# Patient Record
Sex: FEMALE | Race: WHITE | NOT HISPANIC OR LATINO | Employment: FULL TIME | URBAN - METROPOLITAN AREA
[De-identification: names, ages, dates, MRNs, and addresses within clinical notes are randomized per-mention and may not be internally consistent; named-entity substitution may affect disease eponyms.]

---

## 2017-06-09 ENCOUNTER — ALLSCRIPTS OFFICE VISIT (OUTPATIENT)
Dept: OTHER | Facility: OTHER | Age: 47
End: 2017-06-09

## 2017-06-09 DIAGNOSIS — K92.1 MELENA: ICD-10-CM

## 2017-06-09 DIAGNOSIS — Z12.31 ENCOUNTER FOR SCREENING MAMMOGRAM FOR MALIGNANT NEOPLASM OF BREAST: ICD-10-CM

## 2017-06-09 DIAGNOSIS — Z13.6 ENCOUNTER FOR SCREENING FOR CARDIOVASCULAR DISORDERS: ICD-10-CM

## 2017-08-10 ENCOUNTER — TRANSCRIBE ORDERS (OUTPATIENT)
Dept: ADMINISTRATIVE | Facility: HOSPITAL | Age: 47
End: 2017-08-10

## 2017-08-10 ENCOUNTER — APPOINTMENT (OUTPATIENT)
Dept: LAB | Facility: HOSPITAL | Age: 47
End: 2017-08-10

## 2017-08-10 DIAGNOSIS — Z00.8 HEALTH EXAMINATION IN POPULATION SURVEY: Primary | ICD-10-CM

## 2017-08-10 DIAGNOSIS — Z00.8 HEALTH EXAMINATION IN POPULATION SURVEY: ICD-10-CM

## 2017-08-10 LAB
CHOLEST SERPL-MCNC: 196 MG/DL (ref 50–200)
EST. AVERAGE GLUCOSE BLD GHB EST-MCNC: 114 MG/DL
HBA1C MFR BLD: 5.6 % (ref 4.2–6.3)
HDLC SERPL-MCNC: 39 MG/DL (ref 40–60)
LDLC SERPL CALC-MCNC: 133 MG/DL (ref 0–100)
TRIGL SERPL-MCNC: 119 MG/DL

## 2017-08-10 PROCEDURE — 80061 LIPID PANEL: CPT

## 2017-08-10 PROCEDURE — 36415 COLL VENOUS BLD VENIPUNCTURE: CPT

## 2017-08-10 PROCEDURE — 83036 HEMOGLOBIN GLYCOSYLATED A1C: CPT | Performed by: PREVENTIVE MEDICINE

## 2017-12-18 ENCOUNTER — GENERIC CONVERSION - ENCOUNTER (OUTPATIENT)
Dept: OTHER | Facility: OTHER | Age: 47
End: 2017-12-18

## 2018-01-13 VITALS
HEIGHT: 64 IN | WEIGHT: 215 LBS | BODY MASS INDEX: 36.7 KG/M2 | RESPIRATION RATE: 16 BRPM | SYSTOLIC BLOOD PRESSURE: 126 MMHG | HEART RATE: 72 BPM | DIASTOLIC BLOOD PRESSURE: 78 MMHG | TEMPERATURE: 98.2 F

## 2018-01-23 NOTE — MISCELLANEOUS
Message  GI Reminder Recall [facility]:   Date: 12/18/2017   Dear Sabina Briceño:     Review of our records shows you are due for the following: Consult  Please call the following office to schedule your appointment:   [place] [address], [facility] [address], [place], [address] [place] [address] [phone]  We look forward to hearing from you!      Sincerely,     [facility]      Signatures   Electronically signed by : Marlyn Woodard, ; Dec 18 2017  9:12AM EST                       (Author)

## 2018-02-16 ENCOUNTER — APPOINTMENT (OUTPATIENT)
Dept: LAB | Facility: HOSPITAL | Age: 48
End: 2018-02-16
Attending: INTERNAL MEDICINE
Payer: COMMERCIAL

## 2018-02-16 ENCOUNTER — TRANSCRIBE ORDERS (OUTPATIENT)
Dept: ADMINISTRATIVE | Facility: HOSPITAL | Age: 48
End: 2018-02-16

## 2018-02-16 ENCOUNTER — OFFICE VISIT (OUTPATIENT)
Dept: GASTROENTEROLOGY | Facility: CLINIC | Age: 48
End: 2018-02-16
Payer: COMMERCIAL

## 2018-02-16 VITALS
DIASTOLIC BLOOD PRESSURE: 88 MMHG | HEIGHT: 66 IN | BODY MASS INDEX: 34.07 KG/M2 | WEIGHT: 212 LBS | TEMPERATURE: 99.2 F | HEART RATE: 98 BPM | RESPIRATION RATE: 14 BRPM | SYSTOLIC BLOOD PRESSURE: 138 MMHG

## 2018-02-16 DIAGNOSIS — R10.13 DYSPEPSIA: Primary | ICD-10-CM

## 2018-02-16 DIAGNOSIS — R19.4 CHANGE IN BOWEL HABITS: ICD-10-CM

## 2018-02-16 DIAGNOSIS — K62.5 RECTAL BLEEDING: ICD-10-CM

## 2018-02-16 DIAGNOSIS — Z87.19 HISTORY OF MELENA: ICD-10-CM

## 2018-02-16 DIAGNOSIS — R10.13 DYSPEPSIA: ICD-10-CM

## 2018-02-16 LAB
ALBUMIN SERPL BCP-MCNC: 4.3 G/DL (ref 3.5–5)
ALP SERPL-CCNC: 70 U/L (ref 46–116)
ALT SERPL W P-5'-P-CCNC: 80 U/L (ref 12–78)
ANION GAP SERPL CALCULATED.3IONS-SCNC: 9 MMOL/L (ref 4–13)
AST SERPL W P-5'-P-CCNC: 36 U/L (ref 5–45)
BASOPHILS # BLD AUTO: 0 THOUSANDS/ΜL (ref 0–0.1)
BASOPHILS NFR BLD AUTO: 1 % (ref 0–1)
BILIRUB SERPL-MCNC: 0.5 MG/DL (ref 0.2–1)
BUN SERPL-MCNC: 11 MG/DL (ref 5–25)
CALCIUM SERPL-MCNC: 9.6 MG/DL (ref 8.3–10.1)
CHLORIDE SERPL-SCNC: 103 MMOL/L (ref 100–108)
CO2 SERPL-SCNC: 30 MMOL/L (ref 21–32)
CREAT SERPL-MCNC: 0.78 MG/DL (ref 0.6–1.3)
EOSINOPHIL # BLD AUTO: 0.2 THOUSAND/ΜL (ref 0–0.61)
EOSINOPHIL NFR BLD AUTO: 2 % (ref 0–6)
ERYTHROCYTE [DISTWIDTH] IN BLOOD BY AUTOMATED COUNT: 14.3 % (ref 11.6–15.1)
GFR SERPL CREATININE-BSD FRML MDRD: 91 ML/MIN/1.73SQ M
GLUCOSE P FAST SERPL-MCNC: 90 MG/DL (ref 65–99)
HCT VFR BLD AUTO: 43.7 % (ref 37–47)
HGB BLD-MCNC: 14.2 G/DL (ref 12–16)
LYMPHOCYTES # BLD AUTO: 2.4 THOUSANDS/ΜL (ref 0.6–4.47)
LYMPHOCYTES NFR BLD AUTO: 26 % (ref 14–44)
MCH RBC QN AUTO: 26.1 PG (ref 27–31)
MCHC RBC AUTO-ENTMCNC: 32.4 G/DL (ref 31.4–37.4)
MCV RBC AUTO: 81 FL (ref 82–98)
MONOCYTES # BLD AUTO: 0.5 THOUSAND/ΜL (ref 0.17–1.22)
MONOCYTES NFR BLD AUTO: 6 % (ref 4–12)
NEUTROPHILS # BLD AUTO: 6 THOUSANDS/ΜL (ref 1.85–7.62)
NEUTS SEG NFR BLD AUTO: 66 % (ref 43–75)
NRBC BLD AUTO-RTO: 0 /100 WBCS
PLATELET # BLD AUTO: 274 THOUSANDS/UL (ref 130–400)
PMV BLD AUTO: 9.1 FL (ref 8.9–12.7)
POTASSIUM SERPL-SCNC: 4.1 MMOL/L (ref 3.5–5.3)
PROT SERPL-MCNC: 7.9 G/DL (ref 6.4–8.2)
RBC # BLD AUTO: 5.43 MILLION/UL (ref 4.2–5.4)
SODIUM SERPL-SCNC: 142 MMOL/L (ref 136–145)
TSH SERPL DL<=0.05 MIU/L-ACNC: 1.2 UIU/ML (ref 0.36–3.74)
WBC # BLD AUTO: 9.1 THOUSAND/UL (ref 4.8–10.8)

## 2018-02-16 PROCEDURE — 86255 FLUORESCENT ANTIBODY SCREEN: CPT

## 2018-02-16 PROCEDURE — 99204 OFFICE O/P NEW MOD 45 MIN: CPT | Performed by: INTERNAL MEDICINE

## 2018-02-16 PROCEDURE — 36415 COLL VENOUS BLD VENIPUNCTURE: CPT

## 2018-02-16 PROCEDURE — 84443 ASSAY THYROID STIM HORMONE: CPT

## 2018-02-16 PROCEDURE — 83516 IMMUNOASSAY NONANTIBODY: CPT

## 2018-02-16 PROCEDURE — 80053 COMPREHEN METABOLIC PANEL: CPT

## 2018-02-16 PROCEDURE — 82784 ASSAY IGA/IGD/IGG/IGM EACH: CPT

## 2018-02-16 PROCEDURE — 85025 COMPLETE CBC W/AUTO DIFF WBC: CPT

## 2018-02-16 RX ORDER — SUCRALFATE ORAL 1 G/10ML
1 SUSPENSION ORAL 4 TIMES DAILY
Qty: 420 ML | Refills: 2 | Status: SHIPPED | OUTPATIENT
Start: 2018-02-16 | End: 2018-02-19

## 2018-02-16 RX ORDER — PANTOPRAZOLE SODIUM 40 MG/1
40 TABLET, DELAYED RELEASE ORAL DAILY
Qty: 60 TABLET | Refills: 2 | Status: SHIPPED | OUTPATIENT
Start: 2018-02-16 | End: 2019-08-28 | Stop reason: ALTCHOICE

## 2018-02-16 NOTE — LETTER
February 16, 2018     Jackelyn Hennessy, DO  [address] [facility] [address]    Patient: Red Salazar   YOB: 1970   Date of Visit: 2/16/2018       Dear Dr Beth Barakat:    Thank you for referring Red Salazar to me for evaluation  Below are my notes for this consultation  If you have questions, please do not hesitate to call me  I look forward to following your patient along with you           Sincerely,        Oxana Arroyo MD        CC: No Recipients

## 2018-02-16 NOTE — PROGRESS NOTES
Consultation - [address] [facility]  Mateo Grigsby 52 y o  female MRN: [id number]  Unit/Bed#:  Encounter: [id number]        Consults    ASSESSMENT/PLAN:   1  Epigastric pain, bloating and nausea for the past 2 weeks along with 1 episode of melenic bowel movement-suspect peptic ulcer disease versus gastritis versus less likely biliary colic   -no recent labs, will check CBC, CMP, TSH and celiac serologies  Will start on Protonix 40 mg b i d  for the next 3-4 weeks followed by Protonix 40 mg daily  Start on Carafate 1 g q i d     Avoid NSAIDs  Will schedule EGD for further evaluation to assess for peptic ulcer disease   -if symptoms worsen, proceed to the emergency room  - Patient was explained about the lifestyle and dietary modifications  Advised to avoid fatty foods, chocolates, caffeine, alcohol and any other triggering foods  Avoid eating for at least 3 hours before going to bed  2   Change in bowel habits with worsening constipation and blood in the stool-suspect could be hemorrhoidal however given change in bowel habits and family history of colon cancer in aunt, will proceed with colonoscopy to assess for colon polyps  -meanwhile increase fiber to at least 30 g daily  -increase water intake  3  Patient was explained about  the risks and benefits of the procedure  Risks including but not limited to bleeding, infection, perforation were explained in detail  Also explained about less than 100% sensitivity with the exam and other alternatives  ______________________________________________________________________    Reason for Consult / Principal Problem: [unfilled]    HPI: Mateo Grigsby is a 52y o  year old female with no past medical problems who comes in for evaluation of epigastric abdominal pain, nausea and 2 episodes of melenic bowel movement last week    Patient states that she was in her usual state of health and suddenly developed symptoms of epigastric pain, nausea and abdominal bloating  She states that she also had 2 bowel movements which was black in color, most recent bowel movements have been normal  She also endorses that over the past several months she has been noticing red blood in her stool and has been more constipated  She denies any NSAID use  No change in medications or diet  She did buy over-the-counter Prevacid and acidophilus without symptomatic improvement  She endorses family history of colon cancer in maternal aunt at the age of 72  No unintentional weight loss     The    Review of Systems: The remainder of the review of systems was negative except for the pertinent positives noted in HPI  Historical Information   History reviewed  No pertinent past medical history  History reviewed  No pertinent surgical history  Social History   History   Alcohol Use    Yes     History   Drug Use No     History   Smoking Status    Never Smoker   Smokeless Tobacco    Never Used     History reviewed  No pertinent family history  Meds/Allergies       (Not in a hospital admission)  No current facility-administered medications for this visit  No Known Allergies    Objective     Blood pressure 138/88, pulse 98, temperature 99 2 °F (37 3 °C), temperature source Tympanic, resp  rate 14, height 5' 6" (1 676 m), weight 96 2 kg (212 lb)  [unfilled]    PHYSICAL EXAM     GEN: well nourished, well developed, no acute distress  HEENT: anicteric, MMM, no cervical or supraclavicular lymphadenopathy  CV: RRR, no m/r/g  CHEST: CTA b/l, no WRR  ABD: +BS, soft, NT/ND, no hepatosplenomegaly  EXT: no c/c/e  SKIN: no rashes,  NEURO: aaox3    Lab Results:   No visits with results within 1 Day(s) from this visit     Latest known visit with results is:   Appointment on 08/10/2017   Component Date Value    Cholesterol 08/10/2017 196     Triglycerides 08/10/2017 119     HDL, Direct 08/10/2017 39*    LDL Calculated 08/10/2017 133*     Imaging Studies: I have personally reviewed pertinent films in PACS

## 2018-02-19 DIAGNOSIS — R10.13 EPIGASTRIC PAIN: Primary | ICD-10-CM

## 2018-02-19 DIAGNOSIS — R10.13 DYSPEPSIA: ICD-10-CM

## 2018-02-19 LAB
ENDOMYSIUM IGA SER QL: NEGATIVE
GLIADIN PEPTIDE IGA SER-ACNC: 5 UNITS (ref 0–19)
GLIADIN PEPTIDE IGG SER-ACNC: 3 UNITS (ref 0–19)
IGA SERPL-MCNC: 193 MG/DL (ref 87–352)
TTG IGA SER-ACNC: <2 U/ML (ref 0–3)
TTG IGG SER-ACNC: <2 U/ML (ref 0–5)

## 2018-02-19 RX ORDER — SUCRALFATE 1 G/1
1 TABLET ORAL 4 TIMES DAILY
Qty: 120 TABLET | Refills: 3 | Status: SHIPPED | OUTPATIENT
Start: 2018-02-19 | End: 2019-08-28 | Stop reason: ALTCHOICE

## 2018-02-19 NOTE — TELEPHONE ENCOUNTER
Patient called to notify dr huang that she is still having luq tenderness and swelling--she wants to know if you can order the ultrasound

## 2018-02-21 DIAGNOSIS — R94.5 ABNORMAL LIVER FUNCTION: Primary | ICD-10-CM

## 2018-02-22 ENCOUNTER — TELEPHONE (OUTPATIENT)
Dept: GASTROENTEROLOGY | Facility: CLINIC | Age: 48
End: 2018-02-22

## 2018-02-22 NOTE — TELEPHONE ENCOUNTER
----- Message from Shahid Chau MD sent at 2/21/2018  4:55 PM EST -----  Please inform the patient that her liver function tests are slightly abnormal, could be due to fatty liver, would monitor these in the next 2 months, otherwise celiac serologies and TSH are normal     Pt given results as per Dr Nahun Mayfield        By Bull Lema MA

## 2018-02-28 ENCOUNTER — ANESTHESIA EVENT (OUTPATIENT)
Dept: PERIOP | Facility: AMBULARY SURGERY CENTER | Age: 48
End: 2018-02-28
Payer: COMMERCIAL

## 2018-03-12 ENCOUNTER — ANESTHESIA (OUTPATIENT)
Dept: PERIOP | Facility: AMBULARY SURGERY CENTER | Age: 48
End: 2018-03-12
Payer: COMMERCIAL

## 2018-03-19 ENCOUNTER — TELEPHONE (OUTPATIENT)
Dept: FAMILY MEDICINE CLINIC | Facility: CLINIC | Age: 48
End: 2018-03-19

## 2018-03-19 ENCOUNTER — HOSPITAL ENCOUNTER (OUTPATIENT)
Dept: RADIOLOGY | Facility: HOSPITAL | Age: 48
Discharge: HOME | End: 2018-03-19
Payer: COMMERCIAL

## 2018-03-19 ENCOUNTER — TELEPHONE (OUTPATIENT)
Dept: GASTROENTEROLOGY | Facility: CLINIC | Age: 48
End: 2018-03-19

## 2018-03-19 ENCOUNTER — OFFICE VISIT (OUTPATIENT)
Dept: FAMILY MEDICINE CLINIC | Facility: CLINIC | Age: 48
End: 2018-03-19
Payer: COMMERCIAL

## 2018-03-19 VITALS
RESPIRATION RATE: 18 BRPM | HEIGHT: 66 IN | TEMPERATURE: 98 F | BODY MASS INDEX: 34.94 KG/M2 | SYSTOLIC BLOOD PRESSURE: 126 MMHG | WEIGHT: 217.4 LBS | HEART RATE: 76 BPM | DIASTOLIC BLOOD PRESSURE: 80 MMHG

## 2018-03-19 DIAGNOSIS — R10.10 PAIN OF UPPER ABDOMEN: ICD-10-CM

## 2018-03-19 DIAGNOSIS — R74.01 ELEVATED ALT MEASUREMENT: ICD-10-CM

## 2018-03-19 DIAGNOSIS — R93.422 ABNORMAL FINDING ON DIAGNOSTIC IMAGING OF LEFT KIDNEY: Primary | ICD-10-CM

## 2018-03-19 DIAGNOSIS — R10.10 PAIN OF UPPER ABDOMEN: Primary | ICD-10-CM

## 2018-03-19 DIAGNOSIS — R93.422 ABNORMAL FINDING ON DIAGNOSTIC IMAGING OF LEFT KIDNEY: ICD-10-CM

## 2018-03-19 PROCEDURE — 74178 CT ABD&PLV WO CNTR FLWD CNTR: CPT

## 2018-03-19 PROCEDURE — 99213 OFFICE O/P EST LOW 20 MIN: CPT | Performed by: FAMILY MEDICINE

## 2018-03-19 PROCEDURE — 76700 US EXAM ABDOM COMPLETE: CPT

## 2018-03-19 RX ADMIN — IOHEXOL 100 ML: 350 INJECTION, SOLUTION INTRAVENOUS at 17:35

## 2018-03-19 NOTE — TELEPHONE ENCOUNTER
Dr huang patient--she has a colon 4-6-18 and is having nausea, luq pain, wants to see about possibly having an ultrasound-----she would like a call back at [phone]

## 2018-03-19 NOTE — PROGRESS NOTES
Assessment/Plan:    Problem List Items Addressed This Visit     None      Visit Diagnoses     Pain of upper abdomen    -  Primary    worsening pain    Relevant Orders    US abdomen complete (Completed)    Elevated ALT measurement        Relevant Orders    US abdomen complete (Completed)          There are no Patient Instructions on file for this visit  No Follow-up on file  Subjective:      Patient ID: Cherelle Morales is a 52 y o  female  Chief Complaint   Patient presents with    Abdominal Pain     Worsening pain 3/17/18       She was having a feeling of fullness in her upper stomach  Then she had to reschedule the scopes twice  They are now shcedule  for early April  She is having pain in her left upper side  The area feels swollen  The following portions of the patient's history were reviewed and updated as appropriate: allergies, current medications, past family history, past medical history, past social history, past surgical history and problem list     Review of Systems   Gastrointestinal: Positive for abdominal distention and abdominal pain  Negative for constipation  Current Outpatient Prescriptions   Medication Sig Dispense Refill    pantoprazole (PROTONIX) 40 mg tablet Take 1 tablet (40 mg total) by mouth daily 60 tablet 2    sucralfate (CARAFATE) 1 g tablet Take 1 tablet (1 g total) by mouth 4 (four) times a day 120 tablet 3    Na Sulfate-K Sulfate-Mg Sulf 17 5-3 13-1 6 [id number] SOLN Take 1 applicator by mouth once for 1 dose 1 Bottle 0     No current facility-administered medications for this visit  Objective:    /80   Pulse 76   Temp 98 °F (36 7 °C)   Resp 18   Ht 5' 6" (1 676 m)   Wt 98 6 kg (217 lb 6 4 oz)   LMP 01/24/2018 (Within Weeks)   BMI 35 09 kg/m²        Physical Exam   Constitutional: She appears well-developed and well-nourished  HENT:   Head: Normocephalic and atraumatic     Right Ear: External ear normal    Left Ear: External ear normal    Mouth/Throat: Oropharynx is clear and moist    Cardiovascular: Normal rate, regular rhythm and normal heart sounds  Exam reveals no friction rub  No murmur heard  Pulmonary/Chest: Effort normal and breath sounds normal  No respiratory distress  She has no wheezes  She has no rales  Abdominal: Soft  Bowel sounds are normal  She exhibits no distension  There is no tenderness  Musculoskeletal: She exhibits no edema or deformity  Nursing note and vitals reviewed               Mesfin Santana DO

## 2018-03-19 NOTE — TELEPHONE ENCOUNTER
3/19/2018 4:35 PM new order done  I called Landon Primrose and left her a message on her voice mail to let her know it was done    Conchis Harmon, DO

## 2018-03-19 NOTE — TELEPHONE ENCOUNTER
Pt called  States Radioligist  Informed her that there  Is something in her kidneys, and its pretty urgent        can you please order for  Ct w contrast af/rma

## 2018-03-20 ENCOUNTER — TELEPHONE (OUTPATIENT)
Dept: FAMILY MEDICINE CLINIC | Facility: CLINIC | Age: 48
End: 2018-03-20

## 2018-03-20 PROBLEM — K76.0 FATTY LIVER: Status: ACTIVE | Noted: 2018-03-20

## 2018-03-20 NOTE — TELEPHONE ENCOUNTER
3/20/2018  9:44 AM spoke with Paulina Fu and reviewed CT scan results  There is no mass, but she does have a fatty liver     Fransico Manzo, DO

## 2018-03-20 NOTE — TELEPHONE ENCOUNTER
DR CELESTIN    Please call patient immediately with her Cat scan results  She said that radiology told her she could have a kidney mass and shes  Very very upset

## 2018-03-27 ENCOUNTER — TELEPHONE (OUTPATIENT)
Dept: FAMILY MEDICINE CLINIC | Facility: CLINIC | Age: 48
End: 2018-03-27

## 2018-03-27 NOTE — TELEPHONE ENCOUNTER
3/27/2018 5:02 PM returned call to Apple Caceres, message left on machine to call the office    Alma Love DO

## 2018-03-27 NOTE — TELEPHONE ENCOUNTER
Cece Henriquez is calling regarding her U/S and Cat scan results  She wanted to speak with Dr Merlin Ricker regarding them  She wants to know if she still needs endoscopy since the results show hernia      Please call Karyn Edwards at [phone]    Thanks

## 2018-03-28 NOTE — TELEPHONE ENCOUNTER
3/28/2018 11:50 AM called Emmy Olsen again and left a message on her voice mail to call the office    Sharlene Gonzales, DO

## 2018-03-29 NOTE — TELEPHONE ENCOUNTER
3/29/2018 11:55 AM called Kaylee Green and left a message on her voice mail at the number listed in the message    Kate Ya DO

## 2018-03-30 ENCOUNTER — TELEPHONE (OUTPATIENT)
Dept: FAMILY MEDICINE CLINIC | Facility: CLINIC | Age: 48
End: 2018-03-30

## 2018-03-30 PROBLEM — K44.9 HIATAL HERNIA: Status: ACTIVE | Noted: 2018-03-30

## 2018-03-30 NOTE — TELEPHONE ENCOUNTER
Pt has been "playing tag" with you on the phone  She is home now and can take the call at your convenience , she states it is about ct results

## 2018-03-30 NOTE — TELEPHONE ENCOUNTER
3/30/2018 12:49 PM spoke with Eliza Parker  She is feeling much better  We discussed her umbilical hernia and hiatal hernia  I think she should follow up with the EGD to evaluate the hiatal hernia    Vicente Haskins DO

## 2018-04-06 ENCOUNTER — HOSPITAL ENCOUNTER (OUTPATIENT)
Facility: AMBULARY SURGERY CENTER | Age: 48
Setting detail: OUTPATIENT SURGERY
Discharge: HOME | End: 2018-04-06
Attending: INTERNAL MEDICINE | Admitting: INTERNAL MEDICINE
Payer: COMMERCIAL

## 2018-04-06 VITALS
SYSTOLIC BLOOD PRESSURE: 141 MMHG | TEMPERATURE: 97.9 F | OXYGEN SATURATION: 98 % | BODY MASS INDEX: 35.32 KG/M2 | WEIGHT: 212 LBS | DIASTOLIC BLOOD PRESSURE: 91 MMHG | HEART RATE: 69 BPM | RESPIRATION RATE: 20 BRPM | HEIGHT: 65 IN

## 2018-04-06 DIAGNOSIS — R10.13 DYSPEPSIA: ICD-10-CM

## 2018-04-06 DIAGNOSIS — R19.4 CHANGE IN BOWEL HABITS: ICD-10-CM

## 2018-04-06 LAB — EXT PREGNANCY TEST URINE: NEGATIVE

## 2018-04-06 PROCEDURE — 88342 IMHCHEM/IMCYTCHM 1ST ANTB: CPT | Performed by: PATHOLOGY

## 2018-04-06 PROCEDURE — 88305 TISSUE EXAM BY PATHOLOGIST: CPT | Performed by: PATHOLOGY

## 2018-04-06 PROCEDURE — 45385 COLONOSCOPY W/LESION REMOVAL: CPT | Performed by: INTERNAL MEDICINE

## 2018-04-06 PROCEDURE — 45380 COLONOSCOPY AND BIOPSY: CPT | Performed by: INTERNAL MEDICINE

## 2018-04-06 PROCEDURE — 43239 EGD BIOPSY SINGLE/MULTIPLE: CPT | Performed by: INTERNAL MEDICINE

## 2018-04-06 PROCEDURE — 81025 URINE PREGNANCY TEST: CPT | Performed by: INTERNAL MEDICINE

## 2018-04-06 RX ORDER — DIPHENOXYLATE HYDROCHLORIDE AND ATROPINE SULFATE 2.5; .025 MG/1; MG/1
1 TABLET ORAL DAILY
COMMUNITY
End: 2019-08-28 | Stop reason: ALTCHOICE

## 2018-04-06 RX ORDER — SODIUM CHLORIDE 9 MG/ML
125 INJECTION, SOLUTION INTRAVENOUS CONTINUOUS
Status: DISCONTINUED | OUTPATIENT
Start: 2018-04-06 | End: 2018-04-06

## 2018-04-06 RX ORDER — PROPOFOL 10 MG/ML
INJECTION, EMULSION INTRAVENOUS AS NEEDED
Status: DISCONTINUED | OUTPATIENT
Start: 2018-04-06 | End: 2018-04-06 | Stop reason: SURG

## 2018-04-06 RX ADMIN — PROPOFOL 50 MG: 10 INJECTION, EMULSION INTRAVENOUS at 08:26

## 2018-04-06 RX ADMIN — PROPOFOL 100 MG: 10 INJECTION, EMULSION INTRAVENOUS at 08:13

## 2018-04-06 RX ADMIN — SODIUM CHLORIDE 125 ML/HR: 0.9 INJECTION, SOLUTION INTRAVENOUS at 07:26

## 2018-04-06 RX ADMIN — PROPOFOL 50 MG: 10 INJECTION, EMULSION INTRAVENOUS at 08:20

## 2018-04-06 RX ADMIN — PROPOFOL 50 MG: 10 INJECTION, EMULSION INTRAVENOUS at 08:16

## 2018-04-06 RX ADMIN — PROPOFOL 50 MG: 10 INJECTION, EMULSION INTRAVENOUS at 08:36

## 2018-04-06 RX ADMIN — PROPOFOL 50 MG: 10 INJECTION, EMULSION INTRAVENOUS at 08:33

## 2018-04-06 RX ADMIN — PROPOFOL 50 MG: 10 INJECTION, EMULSION INTRAVENOUS at 08:23

## 2018-04-06 RX ADMIN — PROPOFOL 50 MG: 10 INJECTION, EMULSION INTRAVENOUS at 08:42

## 2018-04-06 RX ADMIN — PROPOFOL 40 MG: 10 INJECTION, EMULSION INTRAVENOUS at 08:51

## 2018-04-06 RX ADMIN — PROPOFOL 50 MG: 10 INJECTION, EMULSION INTRAVENOUS at 08:46

## 2018-04-06 RX ADMIN — PROPOFOL 50 MG: 10 INJECTION, EMULSION INTRAVENOUS at 08:39

## 2018-04-06 RX ADMIN — PROPOFOL 50 MG: 10 INJECTION, EMULSION INTRAVENOUS at 08:30

## 2018-04-06 NOTE — ANESTHESIA PREPROCEDURE EVALUATION
Review of Systems/Medical History  Patient summary reviewed  Chart reviewed  No history of anesthetic complications     Cardiovascular  Negative cardio ROS    Pulmonary  Negative pulmonary ROS        GI/Hepatic    GERD , Liver disease (fatty liver), ,  Hiatal hernia, Bowel prep       Negative  ROS        Endo/Other    Obesity (BMI 35)    GYN  Negative gynecology ROS          Hematology  Negative hematology ROS      Musculoskeletal  Negative musculoskeletal ROS        Neurology  Negative neurology ROS      Psychology   Negative psychology ROS              Physical Exam    Airway    Mallampati score: II         Dental   No notable dental hx     Cardiovascular  Comment: Negative ROS, Cardiovascular exam normal    Pulmonary  Pulmonary exam normal     Other Findings        Anesthesia Plan  ASA Score- 3     Anesthesia Type- IV sedation with anesthesia with ASA Monitors  Additional Monitors:   Airway Plan:         Plan Factors- Instructed to abstain from smoking on day of procedure: exsmoker    Patient smoked on day of surgery: exsmoker       Induction- intravenous  Postoperative Plan-     Informed Consent- Anesthetic plan and risks discussed with patient  I personally reviewed this patient with the CRNA  Discussed and agreed on the Anesthesia Plan with the CRNA  Murray Gutierrez

## 2018-04-06 NOTE — OP NOTE
Colonoscopy Procedure Note    Procedure: Colonoscopy    Sedation: Monitored anesthesia care, check anesthesia records      ASA Class: 2    INDICATIONS: Constipation, Rectal Pain    POST-OP DIAGNOSIS: See the impression below    Procedure Details     Prior colonoscopy: No prior colonoscopy  Informed consent was obtained for the procedure, including sedation  Risks of perforation, hemorrhage, adverse drug reaction and aspiration were discussed  The patient was placed in the left lateral decubitus position  Based on the pre-procedure assessment, including review of the patient's medical history, medications, allergies, and review of systems, she had been deemed to be an appropriate candidate for conscious sedation; she was therefore sedated with the medications listed below  The patient was monitored continuously with telemetry, pulse oximetry, blood pressure monitoring, and direct observations  A rectal examination was performed  The variable-stiffness pediatric colonoscope was inserted into the rectum and advanced under direct vision to the cecum, which was identified by the ileocecal valve and appendiceal orifice  The quality of the colonic preparation was good  A careful inspection was made as the colonoscope was withdrawn, including a retroflexed view of the rectum; findings and interventions are described below  Findings:  1   3 cm pedunculated polyp noted in sigmoid colon, removed using hot snare polypectomy  Two hemoclips were placed to prevent post polypectomy bleeding  2   2-3 mm sessile polyp in the distal rectum removed using cold biopsy forceps  3   Moderate diverticulosis starting from the sigmoid, descending and transverse colon  4   Retroflexion was performed and revealed small internal hemorrhoids           Complications:  None; patient tolerated the procedure well  Impression:    1  Two colon polyps  2   Left-sided moderate diverticulosis    3   Small internal hemorrhoids  Recommendations:  1  Follow-up pathology results to determine the timing of the repeat colonoscopy  2   High-fiber diet

## 2018-04-06 NOTE — H&P
History and Physical -  [facility]  Rodolfo Munoz 52 y o  female MRN: [id number]    HPI: Rodolfo Munoz is a 52y o  year old female who presents with epigastric pain and change in bowel habits  Review of Systems    Historical Information   Past Medical History:   Diagnosis Date    GERD (gastroesophageal reflux disease)      Past Surgical History:   Procedure Laterality Date    NO PAST SURGERIES       Social History   History   Alcohol Use    Yes     Comment: social     History   Drug Use No     History   Smoking Status    Former Smoker    Quit date: 3/19/2014   Smokeless Tobacco    Never Used     History reviewed  No pertinent family history  Meds/Allergies     Prescriptions Prior to Admission   Medication    multivitamin (THERAGRAN) TABS    pantoprazole (PROTONIX) 40 mg tablet    sucralfate (CARAFATE) 1 g tablet       No Known Allergies    Objective     Blood pressure 136/81, pulse 80, temperature 97 6 °F (36 4 °C), temperature source Temporal, resp  rate 18, height 5' 5" (1 651 m), weight 96 2 kg (212 lb), SpO2 97 %  PHYSICAL EXAM    Gen: NAD  CV: RRR  CHEST: Clear  ABD: soft, NT/ND  EXT: no edema  Neuro: AAO      ASSESSMENT/PLAN:  This is a 52y o  year old female here for change in bowel habits and epigastric pain,       PLAN:   Procedure: EGD and colonoscopy

## 2018-04-06 NOTE — OP NOTE
ESOPHAGOGASTRODUODENOSCOPY    PROCEDURE: EGD    SEDATION: Monitored anesthesia care, check anesthesia records    ASA Class: 2    INDICATIONS:  Dyspepsia    CONSENT:  Informed consent was obtained for the procedure, including sedation after explaining the risks and benefits of the procedure  Risks including but not limited to bleeding, perforation, infection, and missed lesion  PREPARATION:   Telemetry, pulse oximetry, blood pressure were monitored throughout the procedure  Patient was identified by myself both verbally and by visual inspection of ID band  DESCRIPTION:   Patient was placed in the left lateral decubitus position and was sedated with the above medication  The gastroscope was introduced in to the oropharynx and the esophagus was intubated under direct visualization  Scope was passed down the esophagus up to 2nd part of the duodenum  A careful inspection was made as the gastroscope was withdrawn, including a retroflexed view of the stomach; findings and interventions are described below  FINDINGS:    #1  Esophagus-2 cm tongue of columnar mucosa noted in the distal esophagus -this was biopsied to assess for Alvares's esophagus  Remainder of the esophagus with normal  Squamocolumnar junction was noted at 40 cm  #2  Stomach-patchy nodular mucosa noted in the gastric fundus, body and antrum suggestive of chronic gastritis, biopsies were obtained to assess for H pylori  Retroflexed view was normal     #3  Duodenum-duodenal mucosa appeared normal within the bulb, duodenal sweep and D2  Biopsies were obtained to assess for celiac disease  IMPRESSIONS:      1  Possible short-segment Alvares's esophagus  2   Moderate gastritis  RECOMMENDATIONS:     1  Follow-up pathology results in 2 weeks  2   Avoid NSAIDs  3   Continue PPI daily and Carafate  4   Anti-reflux diet  5   Avoid fatty foods, chocolates, caffeine, alcohol and any other triggering foods    Avoid eating for at least 3 hours before going to bed  6   Repeat EGD pending the results of the distal esophageal biopsies  COMPLICATIONS:  None; patient tolerated the procedure well            DISPOSITION: PACU           CONDITION: Stable

## 2018-04-06 NOTE — ANESTHESIA POSTPROCEDURE EVALUATION
Post-Op Assessment Note      CV Status:  Stable    Mental Status:  Alert and awake    Hydration Status:  Euvolemic    PONV Controlled:  Controlled    Airway Patency:  Patent    Post Op Vitals Reviewed: Yes          Staff: CRNA           /95 (04/06/18 0855)    Temp      Pulse 71 (04/06/18 0855)   Resp 15 (04/06/18 0855)    SpO2 100 % (04/06/18 0855)

## 2018-04-10 ENCOUNTER — TELEPHONE (OUTPATIENT)
Dept: GASTROENTEROLOGY | Facility: CLINIC | Age: 48
End: 2018-04-10

## 2018-04-10 NOTE — TELEPHONE ENCOUNTER
Dr Ileana Anthony,    Pt called in stating that since her egd and colonoscopy on Friday 4/6 she has been experiencing a lot of throat soreness that goes down to her chest  She would like to know if this is normal  Please advise  [phone]  Page was also sent

## 2018-04-11 NOTE — TELEPHONE ENCOUNTER
Spoke with the patient, she will try otc gaviscon and continue PPI for now, if symptoms persist, will need to call back

## 2018-04-16 ENCOUNTER — TELEPHONE (OUTPATIENT)
Dept: GASTROENTEROLOGY | Facility: AMBULARY SURGERY CENTER | Age: 48
End: 2018-04-16

## 2018-04-16 NOTE — TELEPHONE ENCOUNTER
----- Message from [facility] sent at 4/11/2018  9:43 AM EDT -----   Please see below  ----- Message -----  From: Shilo Mendoza MD  Sent: 4/10/2018   8:59 PM  To: [facility]    I reveiwed the path with patient, repeat EGD in 1 year due to endoscopic evidence of liang's- but no HP or celiac  Colon showed TVA with focus of HGD- needs repeat colonoscopy in 2-3 months  Discussed with patient

## 2018-04-16 NOTE — TELEPHONE ENCOUNTER
Left message for pt to call in and schedule colonoscopy with Dr Emily Chau      Left message for pt to call in and schedule colonoscopy with Dr Melani Bolanos

## 2018-06-01 ENCOUNTER — TELEPHONE (OUTPATIENT)
Dept: GASTROENTEROLOGY | Facility: CLINIC | Age: 48
End: 2018-06-01

## 2018-06-01 NOTE — TELEPHONE ENCOUNTER
Dr Vamsi Coleman pt    Pt called in to set up her repeat colonoscopy with dr Vamsi Coleman at [address] [facility]

## 2018-06-08 ENCOUNTER — PREP FOR PROCEDURE (OUTPATIENT)
Dept: GASTROENTEROLOGY | Facility: AMBULARY SURGERY CENTER | Age: 48
End: 2018-06-08

## 2018-06-08 DIAGNOSIS — R19.4 CHANGE IN BOWEL HABITS: ICD-10-CM

## 2018-06-08 DIAGNOSIS — R10.13 DYSPEPSIA: Primary | ICD-10-CM

## 2018-06-14 ENCOUNTER — ANESTHESIA EVENT (OUTPATIENT)
Dept: PERIOP | Facility: AMBULARY SURGERY CENTER | Age: 48
End: 2018-06-14
Payer: COMMERCIAL

## 2018-06-21 ENCOUNTER — TELEPHONE (OUTPATIENT)
Dept: GASTROENTEROLOGY | Facility: MEDICAL CENTER | Age: 48
End: 2018-06-21

## 2018-06-21 DIAGNOSIS — D36.9 TUBULOVILLOUS ADENOMA: Primary | ICD-10-CM

## 2018-06-24 NOTE — ANESTHESIA PREPROCEDURE EVALUATION
Review of Systems/Medical History          Cardiovascular   Pulmonary  Smoker ex-smoker  ,        GI/Hepatic    GERD , Liver disease ,  Hiatal hernia,   Comment: Dyspepsia          Endo/Other     GYN       Hematology   Musculoskeletal       Neurology   Psychology           Physical Exam    Airway    Mallampati score: I  TM Distance: >3 FB  Neck ROM: full     Dental   No notable dental hx     Cardiovascular      Pulmonary      Other Findings        Anesthesia Plan  ASA Score- 2     Anesthesia Type- IV sedation with anesthesia with ASA Monitors  Additional Monitors:   Airway Plan:         Plan Factors-Patient not instructed to abstain from smoking on day of procedure  Patient did not smoke on day of surgery  Induction- intravenous  Postoperative Plan-     Informed Consent- Anesthetic plan and risks discussed with patient and spouse  I personally reviewed this patient with the CRNA  Discussed and agreed on the Anesthesia Plan with the CRNA  Jass Medina

## 2018-06-25 ENCOUNTER — ANESTHESIA (OUTPATIENT)
Dept: PERIOP | Facility: AMBULARY SURGERY CENTER | Age: 48
End: 2018-06-25
Payer: COMMERCIAL

## 2018-06-25 ENCOUNTER — HOSPITAL ENCOUNTER (OUTPATIENT)
Facility: AMBULARY SURGERY CENTER | Age: 48
Setting detail: OUTPATIENT SURGERY
Discharge: HOME | End: 2018-06-25
Attending: INTERNAL MEDICINE | Admitting: INTERNAL MEDICINE
Payer: COMMERCIAL

## 2018-06-25 VITALS
WEIGHT: 200 LBS | OXYGEN SATURATION: 95 % | HEART RATE: 75 BPM | TEMPERATURE: 97.4 F | HEIGHT: 65 IN | DIASTOLIC BLOOD PRESSURE: 79 MMHG | BODY MASS INDEX: 33.32 KG/M2 | SYSTOLIC BLOOD PRESSURE: 119 MMHG | RESPIRATION RATE: 18 BRPM

## 2018-06-25 DIAGNOSIS — Z12.11 ENCOUNTER FOR SCREENING FOR MALIGNANT NEOPLASM OF COLON: ICD-10-CM

## 2018-06-25 LAB — EXT PREGNANCY TEST URINE: NEGATIVE

## 2018-06-25 PROCEDURE — 45385 COLONOSCOPY W/LESION REMOVAL: CPT | Performed by: INTERNAL MEDICINE

## 2018-06-25 PROCEDURE — 88305 TISSUE EXAM BY PATHOLOGIST: CPT | Performed by: PATHOLOGY

## 2018-06-25 PROCEDURE — 81025 URINE PREGNANCY TEST: CPT | Performed by: ANESTHESIOLOGY

## 2018-06-25 RX ORDER — SIMETHICONE 20 MG/.3ML
EMULSION ORAL AS NEEDED
Status: DISCONTINUED | OUTPATIENT
Start: 2018-06-25 | End: 2018-06-25 | Stop reason: HOSPADM

## 2018-06-25 RX ORDER — SODIUM CHLORIDE 9 MG/ML
125 INJECTION, SOLUTION INTRAVENOUS CONTINUOUS
Status: DISCONTINUED | OUTPATIENT
Start: 2018-06-25 | End: 2018-06-25 | Stop reason: HOSPADM

## 2018-06-25 RX ORDER — LIDOCAINE HYDROCHLORIDE 10 MG/ML
INJECTION, SOLUTION INFILTRATION; PERINEURAL AS NEEDED
Status: DISCONTINUED | OUTPATIENT
Start: 2018-06-25 | End: 2018-06-25 | Stop reason: SURG

## 2018-06-25 RX ORDER — PROPOFOL 10 MG/ML
INJECTION, EMULSION INTRAVENOUS AS NEEDED
Status: DISCONTINUED | OUTPATIENT
Start: 2018-06-25 | End: 2018-06-25 | Stop reason: SURG

## 2018-06-25 RX ADMIN — SODIUM CHLORIDE: 0.9 INJECTION, SOLUTION INTRAVENOUS at 13:25

## 2018-06-25 RX ADMIN — PROPOFOL 150 MG: 10 INJECTION, EMULSION INTRAVENOUS at 13:35

## 2018-06-25 RX ADMIN — LIDOCAINE HYDROCHLORIDE 50 MG: 10 INJECTION, SOLUTION INFILTRATION; PERINEURAL at 13:35

## 2018-06-25 RX ADMIN — PROPOFOL 50 MG: 10 INJECTION, EMULSION INTRAVENOUS at 13:44

## 2018-06-25 RX ADMIN — PROPOFOL 150 MG: 10 INJECTION, EMULSION INTRAVENOUS at 13:40

## 2018-06-25 RX ADMIN — SODIUM CHLORIDE 125 ML/HR: 0.9 INJECTION, SOLUTION INTRAVENOUS at 12:54

## 2018-06-25 NOTE — DISCHARGE INSTRUCTIONS
Colorectal Polyps   WHAT YOU NEED TO KNOW:   Colorectal polyps are small growths of tissue in the lining of the colon and rectum  Most polyps are hyperplastic polyps and are usually benign (noncancerous)  Certain types of polyps, called adenomatous polyps, may turn into cancer  DISCHARGE INSTRUCTIONS:   Follow up with your healthcare provider or gastroenterologist as directed: You may need to return for more tests, such as another colonoscopy  Write down your questions so you remember to ask them during your visits  Reduce your risk for colorectal polyps:   · Eat a variety of healthy foods:  Healthy foods include fruit, vegetables, whole-grain breads, low-fat dairy products, beans, lean meat, and fish  Ask if you need to be on a special diet  · Maintain a healthy weight:  Ask your healthcare provider if you need to lose weight and how much you need to lose  Ask for help with a weight loss program     · Exercise:  Begin to exercise slowly and do more as you get stronger  Talk with your healthcare provider before you start an exercise program      · Limit alcohol:  Your risk for polyps increases the more you drink  · Do not smoke: If you smoke, it is never too late to quit  Ask for information about how to stop  For support and more information:   · Wilton Bowens ([facility])  [address], [place] [address]  Phone: 6- 873 - [phone]  Web Address: www digestive  niddk nih gov  Contact your healthcare provider or gastroenterologist if:   · You have a fever  · You have chills, a cough, or feel weak and achy  · You have abdominal pain that does not go away or gets worse after you take medicine  · Your abdomen is swollen  · You are losing weight without trying  · You have questions or concerns about your condition or care  Seek care immediately or call 911 if:   · You have sudden shortness of breath       · You have a fast heart rate, fast breathing, or are too dizzy to stand up  · You have severe abdominal pain  · You see blood in your bowel movement  © 2017 [facility] Information is for End User's use only and may not be sold, redistributed or otherwise used for commercial purposes  All illustrations and images included in CareNotes® are the copyrighted property of A D A M , Inc  or Felipe Kendrick  The above information is an  only  It is not intended as medical advice for individual conditions or treatments  Talk to your doctor, nurse or pharmacist before following any medical regimen to see if it is safe and effective for you

## 2018-06-25 NOTE — OP NOTE
Colonoscopy Procedure Note    Procedure: Colonoscopy    Sedation: Monitored anesthesia care, check anesthesia records      ASA Class: 2    INDICATIONS:  Personal history of colon polyp with tubulovillous adenoma containing high-grade dysplasia features  POST-OP DIAGNOSIS: See the impression below    Procedure Details     Prior colonoscopy: Less than 3 years ago  It is being repeated at an interval of less than 3 years because: Piecemeal removal of polyps    Informed consent was obtained for the procedure, including sedation  Risks of perforation, hemorrhage, adverse drug reaction and aspiration were discussed  The patient was placed in the left lateral decubitus position  Based on the pre-procedure assessment, including review of the patient's medical history, medications, allergies, and review of systems, she had been deemed to be an appropriate candidate for conscious sedation; she was therefore sedated with the medications listed below  The patient was monitored continuously with telemetry, pulse oximetry, blood pressure monitoring, and direct observations  A rectal examination was performed  The variable-stiffness pediatric colonoscope was inserted into the rectum and advanced under direct vision to the cecum, which was identified by the ileocecal valve and appendiceal orifice  The quality of the colonic preparation was good  A careful inspection was made as the colonoscope was withdrawn, including a retroflexed view of the rectum; findings and interventions are described below  Findings:  1  Moderate diverticulosis within the sigmoid, descending and transverse colon  2   Within the distal sigmoid, there was small semi pedunculated polyp measuring 5-6 mm, suspect may be remnant from prior sigmoid polyp-  this was removed using hot snare polypectomy in its entirety  3   Retroflexed view revealed small internal hemorrhoids        Complications:  None; patient tolerated the procedure well     Impression:    1  Single sigmoid polyp  2   Small internal hemorrhoids  Recommendations: Follow-up biopsy results in 2-3 weeks  High-fiber diet

## 2018-06-25 NOTE — H&P
History and Physical - [facility]  Ritesh Hernandez 52 y o  female MRN: [id number]    HPI: Ritesh Hernandez is a 52y o  year old female who presents for evaluation of recent colonoscopy which revealed tubulovillous adenoma with high-grade dysplasia  Review of Systems    Historical Information   Past Medical History:   Diagnosis Date    GERD (gastroesophageal reflux disease)      Past Surgical History:   Procedure Laterality Date    NO PAST SURGERIES      DC COLONOSCOPY FLX DX W/COLLJ SPEC WHEN PFRMD N/A 4/6/2018    Procedure: EGD AND COLONOSCOPY;  Surgeon: Rubi Clark MD;  [facility]; Service: Gastroenterology     Social History   History   Alcohol Use    Yes     Comment: social     History   Drug Use No     History   Smoking Status    Former Smoker    Quit date: 3/19/2014   Smokeless Tobacco    Never Used     History reviewed  No pertinent family history  Meds/Allergies     Prescriptions Prior to Admission   Medication    multivitamin (THERAGRAN) TABS    Na Sulfate-K Sulfate-Mg Sulf 17 5-3 13-1 6 GM/180ML SOLN    pantoprazole (PROTONIX) 40 mg tablet    sucralfate (CARAFATE) 1 g tablet       No Known Allergies    Objective     Blood pressure 130/74, pulse 79, temperature (!) 96 6 °F (35 9 °C), temperature source Temporal, resp  rate 18, height 5' 5" (1 651 m), weight 90 7 kg (200 lb), SpO2 96 %  PHYSICAL EXAM    Gen: NAD  CV: RRR  CHEST: Clear  ABD: soft, NT/ND  EXT: no edema  Neuro: AAO      ASSESSMENT/PLAN:  This is a 52y o  year old female here for surveillance of prior polyp with tubulovillous adenoma with focus of high-grade dysplasia  PLAN:   Procedure:  Colonoscopy

## 2018-06-25 NOTE — ANESTHESIA POSTPROCEDURE EVALUATION
Post-Op Assessment Note      CV Status:  Stable    Mental Status:  Alert and awake    Hydration Status:  Stable    PONV Controlled:  None    Airway Patency:  Patent    Post Op Vitals Reviewed: Yes          Staff: Anesthesiologist, CRNA           BP      Temp     Pulse     Resp      SpO2

## 2018-07-05 ENCOUNTER — TELEPHONE (OUTPATIENT)
Dept: GASTROENTEROLOGY | Facility: AMBULARY SURGERY CENTER | Age: 48
End: 2018-07-05

## 2018-07-05 NOTE — TELEPHONE ENCOUNTER
Dr Palumbo's pt called wanting results that she had done on 06/25/18   Patient can be reached at [phone]

## 2018-07-09 NOTE — TELEPHONE ENCOUNTER
Please advise patient that her polyp was benign  Dr Markus Beltran will recommend when follow up colonoscopy should occur when she returns and we will let patient know  Thanks

## 2018-07-09 NOTE — TELEPHONE ENCOUNTER
Spoke to patient and gave results   Informed her  That when dr huang returns we will get a definite time on a repeat colonoscopy

## 2018-07-11 ENCOUNTER — APPOINTMENT (OUTPATIENT)
Dept: LAB | Facility: HOSPITAL | Age: 48
End: 2018-07-11
Payer: COMMERCIAL

## 2018-07-11 ENCOUNTER — TRANSCRIBE ORDERS (OUTPATIENT)
Dept: ADMINISTRATIVE | Facility: HOSPITAL | Age: 48
End: 2018-07-11

## 2018-07-11 ENCOUNTER — OFFICE VISIT (OUTPATIENT)
Dept: FAMILY MEDICINE CLINIC | Facility: CLINIC | Age: 48
End: 2018-07-11
Payer: COMMERCIAL

## 2018-07-11 VITALS
HEART RATE: 76 BPM | WEIGHT: 208 LBS | TEMPERATURE: 97.3 F | SYSTOLIC BLOOD PRESSURE: 126 MMHG | HEIGHT: 65 IN | BODY MASS INDEX: 34.66 KG/M2 | RESPIRATION RATE: 18 BRPM | DIASTOLIC BLOOD PRESSURE: 78 MMHG

## 2018-07-11 DIAGNOSIS — Z00.8 HEALTH EXAMINATION IN POPULATION SURVEY: ICD-10-CM

## 2018-07-11 DIAGNOSIS — L65.9 HAIR LOSS: ICD-10-CM

## 2018-07-11 DIAGNOSIS — N92.6 IRREGULAR MENSES: ICD-10-CM

## 2018-07-11 DIAGNOSIS — Z12.31 SCREENING MAMMOGRAM, ENCOUNTER FOR: ICD-10-CM

## 2018-07-11 DIAGNOSIS — Z13.1 DIABETES MELLITUS SCREENING: ICD-10-CM

## 2018-07-11 DIAGNOSIS — L65.9 HAIR LOSS: Primary | ICD-10-CM

## 2018-07-11 DIAGNOSIS — Z13.6 SCREENING FOR CARDIOVASCULAR CONDITION: ICD-10-CM

## 2018-07-11 DIAGNOSIS — Z00.8 HEALTH EXAMINATION IN POPULATION SURVEY: Primary | ICD-10-CM

## 2018-07-11 LAB
ALBUMIN SERPL BCP-MCNC: 3.5 G/DL (ref 3.5–5)
ALP SERPL-CCNC: 73 U/L (ref 46–116)
ALT SERPL W P-5'-P-CCNC: 28 U/L (ref 12–78)
ANION GAP SERPL CALCULATED.3IONS-SCNC: 8 MMOL/L (ref 4–13)
AST SERPL W P-5'-P-CCNC: 18 U/L (ref 5–45)
BILIRUB SERPL-MCNC: 0.4 MG/DL (ref 0.2–1)
BUN SERPL-MCNC: 13 MG/DL (ref 5–25)
CALCIUM SERPL-MCNC: 9.1 MG/DL (ref 8.3–10.1)
CHLORIDE SERPL-SCNC: 103 MMOL/L (ref 100–108)
CHOLEST SERPL-MCNC: 199 MG/DL (ref 50–200)
CO2 SERPL-SCNC: 26 MMOL/L (ref 21–32)
CREAT SERPL-MCNC: 0.73 MG/DL (ref 0.6–1.3)
ERYTHROCYTE [DISTWIDTH] IN BLOOD BY AUTOMATED COUNT: 13.2 % (ref 11.6–15.1)
EST. AVERAGE GLUCOSE BLD GHB EST-MCNC: 111 MG/DL
GFR SERPL CREATININE-BSD FRML MDRD: 98 ML/MIN/1.73SQ M
GLUCOSE P FAST SERPL-MCNC: 102 MG/DL (ref 65–99)
HBA1C MFR BLD: 5.5 % (ref 4.2–6.3)
HCT VFR BLD AUTO: 40.4 % (ref 34.8–46.1)
HDLC SERPL-MCNC: 48 MG/DL (ref 40–60)
HGB BLD-MCNC: 12.8 G/DL (ref 11.5–15.4)
IRON SERPL-MCNC: 45 UG/DL (ref 50–170)
LDLC SERPL CALC-MCNC: 121 MG/DL (ref 0–100)
MCH RBC QN AUTO: 25.3 PG (ref 26.8–34.3)
MCHC RBC AUTO-ENTMCNC: 31.7 G/DL (ref 31.4–37.4)
MCV RBC AUTO: 80 FL (ref 82–98)
PLATELET # BLD AUTO: 276 THOUSANDS/UL (ref 149–390)
PMV BLD AUTO: 10.4 FL (ref 8.9–12.7)
POTASSIUM SERPL-SCNC: 4 MMOL/L (ref 3.5–5.3)
PROT SERPL-MCNC: 6.8 G/DL (ref 6.4–8.2)
RBC # BLD AUTO: 5.05 MILLION/UL (ref 3.81–5.12)
SODIUM SERPL-SCNC: 137 MMOL/L (ref 136–145)
TESTOST SERPL-MCNC: 15 NG/DL
TRIGL SERPL-MCNC: 149 MG/DL
TSH SERPL DL<=0.05 MIU/L-ACNC: 1.52 UIU/ML (ref 0.36–3.74)
WBC # BLD AUTO: 9.12 THOUSAND/UL (ref 4.31–10.16)

## 2018-07-11 PROCEDURE — 99213 OFFICE O/P EST LOW 20 MIN: CPT | Performed by: FAMILY MEDICINE

## 2018-07-11 PROCEDURE — 80061 LIPID PANEL: CPT

## 2018-07-11 PROCEDURE — 83036 HEMOGLOBIN GLYCOSYLATED A1C: CPT

## 2018-07-11 PROCEDURE — 84443 ASSAY THYROID STIM HORMONE: CPT

## 2018-07-11 PROCEDURE — 80053 COMPREHEN METABOLIC PANEL: CPT

## 2018-07-11 PROCEDURE — 1036F TOBACCO NON-USER: CPT | Performed by: FAMILY MEDICINE

## 2018-07-11 PROCEDURE — 83540 ASSAY OF IRON: CPT

## 2018-07-11 PROCEDURE — 84403 ASSAY OF TOTAL TESTOSTERONE: CPT

## 2018-07-11 PROCEDURE — 3008F BODY MASS INDEX DOCD: CPT | Performed by: FAMILY MEDICINE

## 2018-07-11 PROCEDURE — 85027 COMPLETE CBC AUTOMATED: CPT

## 2018-07-11 PROCEDURE — 36415 COLL VENOUS BLD VENIPUNCTURE: CPT

## 2018-07-11 NOTE — PROGRESS NOTES
Assessment/Plan:    Problem List Items Addressed This Visit     None      Visit Diagnoses     Hair loss    -  Primary    New    Relevant Orders    CBC (Completed)    Iron (Completed)    TSH, 3rd generation (Completed)    Testosterone (Completed)    Screening for cardiovascular condition        Diabetes mellitus screening        Relevant Orders    Comprehensive metabolic panel (Completed)    Lipid Panel with Direct LDL reflex (Completed)    Hemoglobin A1C (Completed)    Irregular menses        Relevant Orders    Testosterone (Completed)    Screening mammogram, encounter for        Relevant Orders    Mammo screening bilateral w 3d & cad          There are no Patient Instructions on file for this visit  Return if symptoms worsen or fail to improve  Subjective:      Patient ID: Shady Collins is a 52 y o  female  Chief Complaint   Patient presents with    Loosing hair     pt states first noticed  last week  af/rma        She has been losing a lot of hair  It is now coming in clumps  The hair loss is diffuse over her scalp  She noticed it a few weeks ago  The following portions of the patient's history were reviewed and updated as appropriate:  past social history    Review of Systems   Constitutional: Negative for fatigue and fever  Current Outpatient Prescriptions   Medication Sig Dispense Refill    multivitamin (THERAGRAN) TABS Take 1 tablet by mouth daily      pantoprazole (PROTONIX) 40 mg tablet Take 1 tablet (40 mg total) by mouth daily 60 tablet 2    Na Sulfate-K Sulfate-Mg Sulf 17 5-3 13-1 6 GM/180ML SOLN Take 1 kit by mouth once for 1 dose 2 Bottle 0    sucralfate (CARAFATE) 1 g tablet Take 1 tablet (1 g total) by mouth 4 (four) times a day 120 tablet 3     No current facility-administered medications for this visit          Objective:    /78   Pulse 76   Temp (!) 97 3 °F (36 3 °C)   Resp 18   Ht 5' 5" (1 651 m)   Wt 94 3 kg (208 lb)   LMP 07/01/2018   BMI 34 61 kg/m² Physical Exam   Constitutional: She appears well-developed and well-nourished  HENT:   Head: Normocephalic and atraumatic  Right Ear: External ear normal    Left Ear: External ear normal    Nose: Nose normal    Mouth/Throat: Oropharynx is clear and moist    Cardiovascular: Normal rate, regular rhythm and normal heart sounds  Pulmonary/Chest: Effort normal and breath sounds normal  No respiratory distress  She has no wheezes  Skin:   Thinner hair on the top of her scalp   Nursing note and vitals reviewed               Nori Boudreaux DO

## 2018-07-13 ENCOUNTER — TELEPHONE (OUTPATIENT)
Dept: GASTROENTEROLOGY | Facility: AMBULARY SURGERY CENTER | Age: 48
End: 2018-07-13

## 2018-07-13 NOTE — TELEPHONE ENCOUNTER
----- Message from Alfred Lubin MD sent at 7/13/2018  3:02 AM EDT -----  Please inform the patient that biopsies were normal mucosa, no polyp- would recommend repeat colonoscopy in 1-2 years

## 2018-12-19 ENCOUNTER — OFFICE VISIT (OUTPATIENT)
Dept: FAMILY MEDICINE CLINIC | Facility: CLINIC | Age: 48
End: 2018-12-19
Payer: COMMERCIAL

## 2018-12-19 VITALS
HEART RATE: 96 BPM | SYSTOLIC BLOOD PRESSURE: 146 MMHG | WEIGHT: 213 LBS | BODY MASS INDEX: 35.49 KG/M2 | RESPIRATION RATE: 16 BRPM | TEMPERATURE: 100.4 F | HEIGHT: 65 IN | DIASTOLIC BLOOD PRESSURE: 88 MMHG

## 2018-12-19 DIAGNOSIS — R68.89 FLU-LIKE SYMPTOMS: ICD-10-CM

## 2018-12-19 DIAGNOSIS — J20.9 ACUTE BRONCHITIS, UNSPECIFIED ORGANISM: Primary | ICD-10-CM

## 2018-12-19 PROCEDURE — 99213 OFFICE O/P EST LOW 20 MIN: CPT | Performed by: NURSE PRACTITIONER

## 2018-12-19 PROCEDURE — 3008F BODY MASS INDEX DOCD: CPT | Performed by: NURSE PRACTITIONER

## 2018-12-19 PROCEDURE — 1036F TOBACCO NON-USER: CPT | Performed by: NURSE PRACTITIONER

## 2018-12-19 RX ORDER — OSELTAMIVIR PHOSPHATE 75 MG/1
75 CAPSULE ORAL 2 TIMES DAILY
Qty: 10 CAPSULE | Refills: 0 | Status: SHIPPED | OUTPATIENT
Start: 2018-12-19 | End: 2018-12-24

## 2018-12-19 RX ORDER — PROMETHAZINE HYDROCHLORIDE AND CODEINE PHOSPHATE 6.25; 1 MG/5ML; MG/5ML
5 SYRUP ORAL
Qty: 30 ML | Refills: 0 | Status: SHIPPED | OUTPATIENT
Start: 2018-12-19 | End: 2019-08-28 | Stop reason: ALTCHOICE

## 2018-12-19 RX ORDER — DOXYCYCLINE HYCLATE 100 MG/1
100 CAPSULE ORAL EVERY 12 HOURS SCHEDULED
Qty: 20 CAPSULE | Refills: 0 | Status: SHIPPED | OUTPATIENT
Start: 2018-12-19 | End: 2018-12-29

## 2018-12-19 RX ORDER — ALBUTEROL SULFATE 90 UG/1
2 AEROSOL, METERED RESPIRATORY (INHALATION) EVERY 6 HOURS PRN
Qty: 18 G | Refills: 0 | Status: SHIPPED | OUTPATIENT
Start: 2018-12-19 | End: 2019-08-28 | Stop reason: ALTCHOICE

## 2018-12-19 NOTE — PROGRESS NOTES
Assessment/Plan:     Diagnoses and all orders for this visit:    Acute bronchitis, unspecified organism  -     oseltamivir (TAMIFLU) 75 mg capsule; Take 1 capsule (75 mg total) by mouth 2 (two) times a day for 5 days  -     doxycycline hyclate (VIBRAMYCIN) 100 mg capsule; Take 1 capsule (100 mg total) by mouth every 12 (twelve) hours for 10 days  -     albuterol (PROAIR HFA) 90 mcg/act inhaler; Inhale 2 puffs every 6 (six) hours as needed for wheezing  -     promethazine-codeine (PHENERGAN WITH CODEINE) 6 25-10 mg/5 mL syrup; Take 5 mL by mouth daily at bedtime    Flu-like symptoms  -     oseltamivir (TAMIFLU) 75 mg capsule; Take 1 capsule (75 mg total) by mouth 2 (two) times a day for 5 days  -     doxycycline hyclate (VIBRAMYCIN) 100 mg capsule; Take 1 capsule (100 mg total) by mouth every 12 (twelve) hours for 10 days  -     albuterol (PROAIR HFA) 90 mcg/act inhaler; Inhale 2 puffs every 6 (six) hours as needed for wheezing    BMI 35 0-35 9,adult            Patient Instructions: Take medication with food  It is important that you take the entire course of antibiotics prescribed  May also take a probiotic of your choice to maintain healthy GI navi  Can take some probiotic and yogurt with the medication  Increase fluid intake, saline nasal rinses, and hot tea with honey and lemon  Cool air humidification can be helpful as well  May take Ibuprofen or Tylenol as needed for pain or fevers  Mucinex D for sinus congestion or Coricidin HBP if you have high blood pressure or a heart condition  Mucinex or Robitussin DM are effective for cough and chest congestion  Supportive care discussed and advised  Follow up for no improvement and worsening of conditions  Patient advised and educated when to see immediate medical care  Return if symptoms worsen or fail to improve  Subjective:      Patient ID: Lorraine Villa is a 50 y o  female      Chief Complaint   Patient presents with    Cough    Headache     [facility] lpn       HPI  Patient stated that started with cough and congestion couple of days  Stated that symptoms getting worse and using her 's xopenex  Having chills and sweating with fever  Feeling very fatigue  Taking Robitussin and advil cold and sinus for her symptoms  The following portions of the patient's history were reviewed and updated as appropriate: allergies, current medications, past family history, past medical history, past social history, past surgical history and problem list       Review of Systems   Constitutional: Positive for chills, fatigue and fever  HENT: Positive for congestion  Negative for ear discharge, ear pain, facial swelling, hearing loss, mouth sores, nosebleeds, postnasal drip, rhinorrhea, sinus pain, sinus pressure, sneezing, sore throat, trouble swallowing and voice change  Respiratory: Positive for cough  Negative for chest tightness, shortness of breath and wheezing  Cardiovascular: Negative  Gastrointestinal: Negative for abdominal pain, constipation, diarrhea and nausea  Neurological: Positive for headaches  Negative for dizziness, weakness and light-headedness           Objective:    History   Smoking Status    Former Smoker    Quit date: 3/19/2014   Smokeless Tobacco    Never Used       Allergies: No Known Allergies    Vitals:  /88   Pulse 96   Temp 100 4 °F (38 °C)   Resp 16   Ht 5' 5" (1 651 m)   Wt 96 6 kg (213 lb)   BMI 35 45 kg/m²     Current Outpatient Prescriptions   Medication Sig Dispense Refill    multivitamin (THERAGRAN) TABS Take 1 tablet by mouth daily      albuterol (PROAIR HFA) 90 mcg/act inhaler Inhale 2 puffs every 6 (six) hours as needed for wheezing 18 g 0    doxycycline hyclate (VIBRAMYCIN) 100 mg capsule Take 1 capsule (100 mg total) by mouth every 12 (twelve) hours for 10 days 20 capsule 0    Na Sulfate-K Sulfate-Mg Sulf 17 5-3 13-1 6 GM/180ML SOLN Take 1 kit by mouth once for 1 dose (Patient not taking: Reported on 12/19/2018 ) 2 Bottle 0    oseltamivir (TAMIFLU) 75 mg capsule Take 1 capsule (75 mg total) by mouth 2 (two) times a day for 5 days 10 capsule 0    pantoprazole (PROTONIX) 40 mg tablet Take 1 tablet (40 mg total) by mouth daily (Patient not taking: Reported on 12/19/2018 ) 60 tablet 2    promethazine-codeine (PHENERGAN WITH CODEINE) 6 25-10 mg/5 mL syrup Take 5 mL by mouth daily at bedtime 30 mL 0    sucralfate (CARAFATE) 1 g tablet Take 1 tablet (1 g total) by mouth 4 (four) times a day (Patient not taking: Reported on 12/19/2018 ) 120 tablet 3     No current facility-administered medications for this visit  Physical Exam   Constitutional: She is oriented to person, place, and time  She appears well-developed and well-nourished  HENT:   Head: Normocephalic  Right Ear: Tympanic membrane, external ear and ear canal normal    Left Ear: Tympanic membrane, external ear and ear canal normal    Nose: Nose normal  Right sinus exhibits no maxillary sinus tenderness and no frontal sinus tenderness  Left sinus exhibits no maxillary sinus tenderness and no frontal sinus tenderness  Mouth/Throat: Oropharynx is clear and moist and mucous membranes are normal    Neck: Neck supple  Cardiovascular: Normal rate, regular rhythm and normal heart sounds  Pulmonary/Chest: Effort normal  She has wheezes  Abdominal: Normal appearance and bowel sounds are normal  There is no hepatosplenomegaly  There is no tenderness  There is no rebound  Musculoskeletal: Normal range of motion  Lymphadenopathy:        Right cervical: No superficial cervical and no posterior cervical adenopathy present  Left cervical: No superficial cervical and no posterior cervical adenopathy present  Neurological: She is alert and oriented to person, place, and time  Skin: Skin is warm  She is diaphoretic  Psychiatric: She has a normal mood and affect   Her behavior is normal  Judgment and thought content normal    Vitals reviewed                    FATIMAH Blanton

## 2018-12-19 NOTE — LETTER
December 19, 2018     Patient: Ursula Rivas   YOB: 1970   Date of Visit: 12/19/2018       To Whom it May Concern:    Ursula Rivas is under my professional care  She was seen in my office on 12/19/2018  Please excuse from work on 12/20 and 12/21  If you have any questions or concerns, please don't hesitate to call           Sincerely,          FATIMAH Fabian        CC: No Recipients

## 2018-12-19 NOTE — PATIENT INSTRUCTIONS
Take medication with food  It is important that you take the entire course of antibiotics prescribed  May also take a probiotic of your choice to maintain healthy GI navi  Can take some probiotic and yogurt with the medication  Increase fluid intake, saline nasal rinses, and hot tea with honey and lemon  Cool air humidification can be helpful as well  May take Ibuprofen or Tylenol as needed for pain or fevers  Mucinex D for sinus congestion or Coricidin HBP if you have high blood pressure or a heart condition  Mucinex or Robitussin DM are effective for cough and chest congestion  Supportive care discussed and advised  Follow up for no improvement and worsening of conditions  Patient advised and educated when to see immediate medical care

## 2019-03-18 ENCOUNTER — TELEPHONE (OUTPATIENT)
Dept: FAMILY MEDICINE CLINIC | Facility: CLINIC | Age: 49
End: 2019-03-18

## 2019-03-18 ENCOUNTER — OFFICE VISIT (OUTPATIENT)
Dept: FAMILY MEDICINE CLINIC | Facility: CLINIC | Age: 49
End: 2019-03-18
Payer: COMMERCIAL

## 2019-03-18 VITALS
RESPIRATION RATE: 18 BRPM | HEIGHT: 64 IN | TEMPERATURE: 97.6 F | BODY MASS INDEX: 36.54 KG/M2 | SYSTOLIC BLOOD PRESSURE: 132 MMHG | DIASTOLIC BLOOD PRESSURE: 84 MMHG | WEIGHT: 214 LBS | HEART RATE: 78 BPM

## 2019-03-18 DIAGNOSIS — Z00.00 ANNUAL PHYSICAL EXAM: Primary | ICD-10-CM

## 2019-03-18 DIAGNOSIS — Z78.9 NO HISTORY OF HEPATITIS B VACCINATION: ICD-10-CM

## 2019-03-18 DIAGNOSIS — Z11.1 ENCOUNTER FOR PPD TEST: ICD-10-CM

## 2019-03-18 DIAGNOSIS — Z78.9 HISTORY OF MEASLES, MUMPS, RUBELLA (MMR) VACCINATION UNKNOWN: ICD-10-CM

## 2019-03-18 PROCEDURE — 99396 PREV VISIT EST AGE 40-64: CPT | Performed by: NURSE PRACTITIONER

## 2019-03-18 PROCEDURE — 86580 TB INTRADERMAL TEST: CPT

## 2019-03-18 NOTE — PATIENT INSTRUCTIONS
Wellness Visit for Adults   WHAT YOU NEED TO KNOW:   What is a wellness visit? A wellness visit is when you see your healthcare provider to get screened for health problems  You can also get advice on how to stay healthy  Write down your questions so you remember to ask them  Ask your healthcare provider how often you should have a wellness visit  What happens at a wellness visit? Your healthcare provider will ask about your health, and your family history of health problems  This includes high blood pressure, heart disease, and cancer  He or she will ask if you have symptoms that concern you, if you smoke, and about your mood  You may also be asked about your intake of medicines, supplements, food, and alcohol  Any of the following may be done:  · Your weight  will be checked  Your height may also be checked so your body mass index (BMI) can be calculated  Your BMI shows if you are at a healthy weight  · Your blood pressure  and heart rate will be checked  Your temperature may also be checked  · Blood and urine tests  may be done  Blood tests may be done to check your cholesterol levels  Abnormal cholesterol levels increase your risk for heart disease and stroke  You may also need a blood or urine test to check for diabetes if you are at increased risk  Urine tests may be done to look for signs of an infection or kidney disease  · A physical exam  includes checking your heartbeat and lungs with a stethoscope  Your healthcare provider may also check your skin to look for sun damage  · Screening tests  may be recommended  A screening test is done to check for diseases that may not cause symptoms  The screening tests you may need depend on your age, gender, family history, and lifestyle habits  For example, colorectal screening may be recommended if you are 48years old or older  What screening tests do I need if I am a woman? · A Pap smear  is used to screen for cervical cancer   Pap smears are usually done every 3 to 5 years depending on your age  You may need them more often if you have had abnormal Pap smear test results in the past  Ask your healthcare provider how often you should have a Pap smear  · A mammogram  is an x-ray of your breasts to screen for breast cancer  Experts recommend mammograms every 2 years starting at age 48 years  You may need a mammogram at age 52 years or younger if you have an increased risk for breast cancer  Talk to your healthcare provider about when you should start having mammograms and how often you need them  What vaccines might I need? · Get an influenza vaccine  every year  The influenza vaccine protects you from the flu  Several types of viruses cause the flu  The viruses change over time, so new vaccines are made each year  · Get a tetanus-diphtheria (Td) booster vaccine  every 10 years  This vaccine protects you against tetanus and diphtheria  Tetanus is a severe infection that may cause painful muscle spasms and lockjaw  Diphtheria is a severe bacterial infection that causes a thick covering in the back of your mouth and throat  · Get a human papillomavirus (HPV) vaccine  if you are female and aged 23 to 32 or male 23 to 24 and never received it  This vaccine protects you from HPV infection  HPV is the most common infection spread by sexual contact  HPV may also cause vaginal, penile, and anal cancers  · Get a pneumococcal vaccine  if you are aged 72 years or older  The pneumococcal vaccine is an injection given to protect you from pneumococcal disease  Pneumococcal disease is an infection caused by pneumococcal bacteria  The infection may cause pneumonia, meningitis, or an ear infection  · Get a shingles vaccine  if you are aged 61 or older, even if you have had shingles before  The shingles vaccine is an injection to protect you from the varicella-zoster virus  This is the same virus that causes chickenpox   Shingles is a painful rash that develops in people who had chickenpox or have been exposed to the virus  How can I eat healthy? My Plate is a model for planning healthy meals  It shows the types and amounts of foods that should go on your plate  Fruits and vegetables make up about half of your plate, and grains and protein make up the other half  A serving of dairy is included on the side of your plate  The amount of calories and serving sizes you need depends on your age, gender, weight, and height  Examples of healthy foods are listed below:  · Eat a variety of vegetables  such as dark green, red, and orange vegetables  You can also include canned vegetables low in sodium (salt) and frozen vegetables without added butter or sauces  · Eat a variety of fresh fruits , canned fruit in 100% juice, frozen fruit, and dried fruit  · Include whole grains  At least half of the grains you eat should be whole grains  Examples include whole-wheat bread, wheat pasta, brown rice, and whole-grain cereals such as oatmeal     · Eat a variety of protein foods such as seafood (fish and shellfish), lean meat, and poultry without skin (turkey and chicken)  Examples of lean meats include pork leg, shoulder, or tenderloin, and beef round, sirloin, tenderloin, and extra lean ground beef  Other protein foods include eggs and egg substitutes, beans, peas, soy products, nuts, and seeds  · Choose low-fat dairy products such as skim or 1% milk or low-fat yogurt, cheese, and cottage cheese  · Limit unhealthy fats  such as butter, hard margarine, and shortening  How much exercise do I need? Exercise at least 30 minutes per day on most days of the week  Some examples of exercise include walking, biking, dancing, and swimming  You can also fit in more physical activity by taking the stairs instead of the elevator or parking farther away from stores  Include muscle strengthening activities 2 days each week  Regular exercise provides many health benefits  It helps you manage your weight, and decreases your risk for type 2 diabetes, heart disease, stroke, and high blood pressure  Exercise can also help improve your mood  Ask your healthcare provider about the best exercise plan for you  What are some general health and safety guidelines I should follow? · Do not smoke  Nicotine and other chemicals in cigarettes and cigars can cause lung damage  Ask your healthcare provider for information if you currently smoke and need help to quit  E-cigarettes or smokeless tobacco still contain nicotine  Talk to your healthcare provider before you use these products  · Limit alcohol  A drink of alcohol is 12 ounces of beer, 5 ounces of wine, or 1½ ounces of liquor  · Lose weight, if needed  Being overweight increases your risk of certain health conditions  These include heart disease, high blood pressure, type 2 diabetes, and certain types of cancer  · Protect your skin  Do not sunbathe or use tanning beds  Use sunscreen with a SPF 15 or higher  Apply sunscreen at least 15 minutes before you go outside  Reapply sunscreen every 2 hours  Wear protective clothing, hats, and sunglasses when you are outside  · Drive safely  Always wear your seatbelt  Make sure everyone in your car wears a seatbelt  A seatbelt can save your life if you are in an accident  Do not use your cell phone when you are driving  This could distract you and cause an accident  Pull over if you need to make a call or send a text message  · Practice safe sex  Use latex condoms if are sexually active and have more than one partner  Your healthcare provider may recommend screening tests for sexually transmitted infections (STIs)  · Wear helmets, lifejackets, and protective gear  Always wear a helmet when you ride a bike or motorcycle, go skiing, or play sports that could cause a head injury  Wear protective equipment when you play sports   Wear a lifejacket when you are on a boat or doing water sports  CARE AGREEMENT:   You have the right to help plan your care  Learn about your health condition and how it may be treated  Discuss treatment options with your caregivers to decide what care you want to receive  You always have the right to refuse treatment  The above information is an  only  It is not intended as medical advice for individual conditions or treatments  Talk to your doctor, nurse or pharmacist before following any medical regimen to see if it is safe and effective for you  © 2017 [employer] Information is for End User's use only and may not be sold, redistributed or otherwise used for commercial purposes  All illustrations and images included in CareNotes® are the copyrighted property of [employer]  or [employer]rick

## 2019-03-18 NOTE — PROGRESS NOTES
FAMILY PRACTICE HEALTH MAINTENANCE OFFICE VISIT  [facility]    NAME: Salina Palacios  AGE: 50 y o  SEX: female  : 1970     DATE: 3/18/2019    Assessment and Plan              Diagnoses and all orders for this visit:    Annual physical exam    History of measles, mumps, rubella (MMR) vaccination unknown  -     Rubeola antibody IgG; Future  -     Rubella antibody, IgG; Future  -     Mumps antibody, IgG; Future    No history of hepatitis B vaccination  -     Hepatitis B surface antibody; Future    Encounter for PPD test  -     TB Skin Test            · Patient Counseling:   · Nutrition: Stressed importance of a well balanced diet, moderation of sodium/saturated fat, caloric balance and sufficient intake of fiber  · Exercise: Stressed the importance of regular exercise with a goal of 150 minutes per week  · Dental Health: Discussed daily flossing and brushing and regular dental visits   · Sexuality: Discussed sexually transmitted infections, use of condoms and prevention of unintended pregnancy  · Alcohol Use:  Recommended moderation of alcohol intake  · Injury Prevention: Discussed Safety Belts, Safety Helmets, and Smoke Detectors    · Immunizations reviewed and will bring records from her job  · Discussed benefits of screening of breast cancer, and cervical cancer screening  BMI Counseling: Body mass index is 36 73 kg/m²  Discussed with patient's BMI with her  The BMI is above average  BMI counseling and education was provided to the patient  Nutrition recommendations include reducing portion sizes, decreasing overall calorie intake, 3-5 servings of fruits/vegetables daily, reducing fast food intake, consuming healthier snacks, decreasing soda and/or juice intake, moderation in carbohydrate intake, increasing intake of lean protein, reducing intake of saturated fat and trans fat and reducing intake of cholesterol   Exercise recommendations include exercising 3-5 times per week, joining a gym and strength training exercises  Pharmacotherapy was recommended as ordered  Referral to a nutritionist was provided to the patient  Referral to weight management was provided to the patient  Referral to bariatric surgery was provided to the patient  Return in about 1 year (around 3/18/2020) for Annual physical         Chief Complaint     Chief Complaint   Patient presents with    Physical Exam     [id number]       History of Present Illness     HPI    Well Adult Physical   Patient here for a comprehensive physical exam   Denies any concerns and complaints  Currently in school for masters and need physical and immunization records for clinicals  Stated that edna pimentel appt with GYN for pap and breast exam      Diet and Physical Activity  Diet: well balanced diet  Weight concerns: Patient has class 2 obesity (BMI 35 0-39  9)  Exercise: Just started doing yoga      Depression Screen  PHQ-9 Depression Screening    PHQ-9:    Frequency of the following problems over the past two weeks:       Little interest or pleasure in doing things:  0 - not at all  Feeling down, depressed, or hopeless:  0 - not at all  PHQ-2 Score:  0          General Health  Hearing: Normal:  bilateral  Vision: no vision problems, most recent eye exam >1 year and wears glasses  Dental: regular dental visits    Reproductive Health  Active      The following portions of the patient's history were reviewed and updated as appropriate: allergies, current medications, past family history, past medical history, past social history, past surgical history and problem list     Review of Systems     Review of Systems   Constitutional: Negative  HENT: Negative  Eyes: Negative  Respiratory: Negative  Cardiovascular: Negative  Gastrointestinal: Negative  Endocrine: Negative  Genitourinary: Negative  Musculoskeletal: Negative  Skin: Negative  Allergic/Immunologic: Negative  Neurological: Negative  Hematological: Negative  Psychiatric/Behavioral: Negative  Past Medical History     Past Medical History:   Diagnosis Date    Concussion with no loss of consciousness 10/02/2015    resolved 2017    GERD (gastroesophageal reflux disease)        Past Surgical History     Past Surgical History:   Procedure Laterality Date    NO PAST SURGERIES      UT COLONOSCOPY FLX DX W/COLLJ SPEC WHEN PFRMD N/A 2018    Procedure: EGD AND COLONOSCOPY;  Surgeon: Melany Villeda MD;  Location[facility]; Service: Gastroenterology    UT COLONOSCOPY FLX DX W/COLLJ SPEC WHEN PFRMD N/A 2018    Procedure: COLONOSCOPY;  Surgeon: Melany Villeda MD;  Location[facility];   Service: Gastroenterology       Social History     Social History     Socioeconomic History    Marital status: /Civil Union     Spouse name: None    Number of children: None    Years of education: None    Highest education level: None   Occupational History    None   Social Needs    Financial resource strain: None    Food insecurity:     Worry: None     Inability: None    Transportation needs:     Medical: None     Non-medical: None   Tobacco Use    Smoking status: Former Smoker     Last attempt to quit: 3/19/2014     Years since quittin 0    Smokeless tobacco: Never Used   Substance and Sexual Activity    Alcohol use: Yes     Frequency: 2-4 times a month     Drinks per session: 1 or 2     Comment: social    Drug use: No    Sexual activity: None   Lifestyle    Physical activity:     Days per week: None     Minutes per session: None    Stress: None   Relationships    Social connections:     Talks on phone: None     Gets together: None     Attends Mu-ism service: None     Active member of club or organization: None     Attends meetings of clubs or organizations: None     Relationship status: None    Intimate partner violence:     Fear of current or ex partner: None     Emotionally abused: None Physically abused: None     Forced sexual activity: None   Other Topics Concern    None   Social History Narrative    None       Family History     Family History   Problem Relation Age of Onset    Hypertension Father        Current Medications       Current Outpatient Medications:     multivitamin (THERAGRAN) TABS, Take 1 tablet by mouth daily, Disp: , Rfl:     albuterol (PROAIR HFA) 90 mcg/act inhaler, Inhale 2 puffs every 6 (six) hours as needed for wheezing (Patient not taking: Reported on 3/18/2019), Disp: 18 g, Rfl: 0    Na Sulfate-K Sulfate-Mg Sulf 17 5-3 13-1 6 GM/180ML SOLN, Take 1 kit by mouth once for 1 dose (Patient not taking: Reported on 12/19/2018 ), Disp: 2 Bottle, Rfl: 0    pantoprazole (PROTONIX) 40 mg tablet, Take 1 tablet (40 mg total) by mouth daily (Patient not taking: Reported on 12/19/2018 ), Disp: 60 tablet, Rfl: 2    promethazine-codeine (PHENERGAN WITH CODEINE) 6 25-10 mg/5 mL syrup, Take 5 mL by mouth daily at bedtime (Patient not taking: Reported on 3/18/2019), Disp: 30 mL, Rfl: 0    sucralfate (CARAFATE) 1 g tablet, Take 1 tablet (1 g total) by mouth 4 (four) times a day (Patient not taking: Reported on 12/19/2018 ), Disp: 120 tablet, Rfl: 3     Allergies     No Known Allergies    Objective     /84   Pulse 78   Temp 97 6 °F (36 4 °C)   Resp 18   Ht 5' 4" (1 626 m)   Wt 97 1 kg (214 lb)   LMP 03/04/2019 (Approximate)   BMI 36 73 kg/m²      Physical Exam   Constitutional: She is oriented to person, place, and time  She appears well-developed and well-nourished  HENT:   Head: Normocephalic  Right Ear: Tympanic membrane, external ear and ear canal normal    Left Ear: Tympanic membrane, external ear and ear canal normal    Nose: Nose normal  Right sinus exhibits no maxillary sinus tenderness and no frontal sinus tenderness  Left sinus exhibits no maxillary sinus tenderness and no frontal sinus tenderness     Mouth/Throat: Oropharynx is clear and moist and mucous membranes are normal    Eyes: Pupils are equal, round, and reactive to light  Conjunctivae and lids are normal    Neck: Normal range of motion  Neck supple  Cardiovascular: Normal rate, regular rhythm and normal heart sounds  Pulmonary/Chest: Effort normal and breath sounds normal    Abdominal: Soft  Normal appearance and bowel sounds are normal  There is no hepatomegaly  There is no tenderness  There is no rebound and no CVA tenderness  Hernia confirmed negative in the right inguinal area and confirmed negative in the left inguinal area  Genitourinary:   Genitourinary Comments: Will got to GYN for breast exam and cervical cancer screening  Musculoskeletal: Normal range of motion  She exhibits no edema, tenderness or deformity  Lymphadenopathy:        Right cervical: No superficial cervical and no posterior cervical adenopathy present  Left cervical: No superficial cervical and no posterior cervical adenopathy present  Right: No inguinal and no supraclavicular adenopathy present  Left: No inguinal and no supraclavicular adenopathy present  Neurological: She is alert and oriented to person, place, and time  She has normal strength and normal reflexes  No sensory deficit  Coordination and gait normal  GCS eye subscore is 4  GCS verbal subscore is 5  GCS motor subscore is 6  Skin: Skin is warm, dry and intact  Psychiatric: She has a normal mood and affect  Her speech is normal and behavior is normal  Judgment and thought content normal  Cognition and memory are normal    Vitals reviewed           Visual Acuity Screening    Right eye Left eye Both eyes   Without correction: 20/30 20/25 20/20   With correction:          Health Maintenance     Health Maintenance   Topic Date Due    MAMMOGRAM  1970    BMI: Followup Plan  09/26/1988    PAP SMEAR  09/26/1991    Depression Screening PHQ  03/18/2020    BMI: Adult  03/18/2020    CRC Screening: Colonoscopy  06/25/2020    DTaP,Tdap,and Td Vaccines (2 - Tdap) 07/26/2025    INFLUENZA VACCINE  Completed    HEPATITIS B VACCINES  Aged Out     Immunization History   Administered Date(s) Administered    DTaP 07/26/2015    INFLUENZA 10/03/2014, 12/03/2018    Influenza Quadrivalent 3 years and older 12/01/2018    Influenza Quadrivalent, 6-35 Months IM 10/10/2016    Tuberculin Skin Test-PPD Intradermal 03/18/2019       Twila Coello, [address]

## 2019-03-20 ENCOUNTER — CLINICAL SUPPORT (OUTPATIENT)
Dept: FAMILY MEDICINE CLINIC | Facility: CLINIC | Age: 49
End: 2019-03-20
Payer: COMMERCIAL

## 2019-03-20 DIAGNOSIS — Z11.1 ENCOUNTER FOR PPD SKIN TEST READING: ICD-10-CM

## 2019-03-20 DIAGNOSIS — Z23 NEED FOR VACCINATION: Primary | ICD-10-CM

## 2019-03-20 LAB
INDURATION: 0 MM
TB SKIN TEST: NEGATIVE

## 2019-03-20 PROCEDURE — 90715 TDAP VACCINE 7 YRS/> IM: CPT

## 2019-03-20 PROCEDURE — 99024 POSTOP FOLLOW-UP VISIT: CPT

## 2019-03-20 PROCEDURE — 90471 IMMUNIZATION ADMIN: CPT

## 2019-03-21 ENCOUNTER — TRANSCRIBE ORDERS (OUTPATIENT)
Dept: ADMINISTRATIVE | Facility: HOSPITAL | Age: 49
End: 2019-03-21

## 2019-03-21 ENCOUNTER — APPOINTMENT (OUTPATIENT)
Dept: LAB | Facility: HOSPITAL | Age: 49
End: 2019-03-21
Payer: COMMERCIAL

## 2019-03-21 DIAGNOSIS — Z78.9 NO HISTORY OF HEPATITIS B VACCINATION: ICD-10-CM

## 2019-03-21 DIAGNOSIS — Z78.9 HISTORY OF MEASLES, MUMPS, RUBELLA (MMR) VACCINATION UNKNOWN: ICD-10-CM

## 2019-03-21 LAB
HBV SURFACE AB SER-ACNC: 159.9 MIU/ML
RUBV IGG SERPL IA-ACNC: 32.9 IU/ML

## 2019-03-21 PROCEDURE — 86762 RUBELLA ANTIBODY: CPT

## 2019-03-21 PROCEDURE — 86706 HEP B SURFACE ANTIBODY: CPT

## 2019-03-21 PROCEDURE — 86765 RUBEOLA ANTIBODY: CPT

## 2019-03-21 PROCEDURE — 86735 MUMPS ANTIBODY: CPT

## 2019-03-21 PROCEDURE — 36415 COLL VENOUS BLD VENIPUNCTURE: CPT

## 2019-03-26 ENCOUNTER — CLINICAL SUPPORT (OUTPATIENT)
Dept: FAMILY MEDICINE CLINIC | Facility: CLINIC | Age: 49
End: 2019-03-26
Payer: COMMERCIAL

## 2019-03-26 DIAGNOSIS — Z92.29 HISTORY OF VARICELLA VACCINATION: Primary | ICD-10-CM

## 2019-03-26 DIAGNOSIS — Z23 NEED FOR MMR VACCINE: Primary | ICD-10-CM

## 2019-03-26 LAB
MEV IGG SER QL: NORMAL
MUV IGG SER QL: ABNORMAL

## 2019-03-26 PROCEDURE — 90471 IMMUNIZATION ADMIN: CPT

## 2019-03-26 PROCEDURE — 90707 MMR VACCINE SC: CPT

## 2019-03-29 ENCOUNTER — APPOINTMENT (OUTPATIENT)
Dept: LAB | Facility: HOSPITAL | Age: 49
End: 2019-03-29
Payer: COMMERCIAL

## 2019-03-29 DIAGNOSIS — Z92.29 HISTORY OF VARICELLA VACCINATION: ICD-10-CM

## 2019-03-29 PROCEDURE — 86787 VARICELLA-ZOSTER ANTIBODY: CPT

## 2019-03-29 PROCEDURE — 36415 COLL VENOUS BLD VENIPUNCTURE: CPT

## 2019-04-01 LAB — VZV IGG SER IA-ACNC: NORMAL

## 2019-08-27 NOTE — PROGRESS NOTES
Assessment/Plan:    No problem-specific Assessment & Plan notes found for this encounter  There are no diagnoses linked to this encounter  Subjective:      Patient ID: Aylin Harris is a 50 y o  female  Pt is a new patient to our office who presents today with complaints of irregular bleeding  The following portions of the patient's history were reviewed and updated as appropriate: allergies, current medications, past family history, past medical history, past social history, past surgical history and problem list     Review of Systems      Objective: There were no vitals taken for this visit           Physical Exam

## 2019-08-28 ENCOUNTER — OFFICE VISIT (OUTPATIENT)
Dept: OBGYN CLINIC | Facility: CLINIC | Age: 49
End: 2019-08-28
Payer: COMMERCIAL

## 2019-08-28 VITALS — BODY MASS INDEX: 37.59 KG/M2 | SYSTOLIC BLOOD PRESSURE: 120 MMHG | WEIGHT: 219 LBS | DIASTOLIC BLOOD PRESSURE: 82 MMHG

## 2019-08-28 DIAGNOSIS — Z01.419 ENCNTR FOR GYN EXAM (GENERAL) (ROUTINE) W/O ABN FINDINGS: Primary | ICD-10-CM

## 2019-08-28 DIAGNOSIS — Z12.39 BREAST CANCER SCREENING: ICD-10-CM

## 2019-08-28 DIAGNOSIS — N92.1 MENORRHAGIA WITH IRREGULAR CYCLE: ICD-10-CM

## 2019-08-28 PROCEDURE — 87624 HPV HI-RISK TYP POOLED RSLT: CPT | Performed by: NURSE PRACTITIONER

## 2019-08-28 PROCEDURE — G0145 SCR C/V CYTO,THINLAYER,RESCR: HCPCS | Performed by: NURSE PRACTITIONER

## 2019-08-28 PROCEDURE — 99386 PREV VISIT NEW AGE 40-64: CPT | Performed by: NURSE PRACTITIONER

## 2019-08-28 RX ORDER — NORETHINDRONE ACETATE AND ETHINYL ESTRADIOL 1; .02 MG/1; MG/1
1 TABLET ORAL DAILY
Qty: 84 TABLET | Refills: 0 | Status: SHIPPED | OUTPATIENT
Start: 2019-08-28 | End: 2020-12-03

## 2019-08-28 NOTE — PROGRESS NOTES
Assessment/Plan   Diagnoses and all orders for this visit:    Menorrhagia with irregular cycle  -     norethindrone-ethinyl estradiol (MICROGESTIN 1/20) 1-20 MG-MCG per tablet; Take 1 tablet by mouth daily  -     US pelvis complete w transvaginal; Future  -     T4, free  -     TSH, 3rd generation  -     CBC and differential    Breast cancer screening  -     Mammo screening bilateral w cad; Future    Encntr for gyn exam (general) (routine) w/o abn findings  -     Liquid-based pap, screening  -     HPV High Risk; Future  -     HPV High Risk        Discussion    Reviewed normal exam today  Pap with HPv done today  Rx for CBC and TFTs as well as pelvic ultrasound to assess for menorrhagia with irregular cycle  Discussed lolly-menopause and menopause with patient  Will call with results and follow up accordingly  Discussed options to help with menorrhagia such as hormonal contraceptives, motrin, Lysteda, ablation, hysterectomy  Reviewed birth control options including OCP, NuvaRing, Patch, Depo provera, Nexplanon  Reviewed when to start  What to do if misses pill  Recommended condom use for STD protection and back up method for at least first month after starting pill or if misses more than 2 pills in the pack  Reviewed common side effects of pill including N/V, HA, Breast Pain, Weight gain, bloating, mood swings  Reassured side effects diminished after first month or two on the pill  Reviewed clotting risk and S/S of PE, DVT, MI, and stroke  Rx for OCP Loestrin 1/20 sent to pharmacy  Normal breast exam today  Monthly SBEs advised  Mammograms yearly  Rx for mammogram, reviewed how to schedule  Encourage at least 1200 mg calcium citrate + 2000 IUs vitamin D3 divided through diet and supplement throughout the day  Encourage 30-40 min weight bearing exercise most days of week  Colon cancer screening with a colonoscopy is up to date    All questions have been answered to her satisfaction  RTO 3 months for pill check or sooner if needed    Sam Ariza is a 50 y o  Female new patient who presents for annual well woman exam and to discuss irregular, heavy menses  Last exam 2010 Pap Normal per patient, no records available    Pap guidelines reviewed with patient  Pt would like Pap today  Pt denies any abnormal vaginal discharge, itching, or odor  Pt in a mutually exclusive relationship () with a male partner and denies the need for STD testing today  Menstrual Cycle:  LMP: 8/13/19   Last year menses became very heavy, but were still regular  She did not get a menses for 3 months  End of July she got menses again which was really heavy for 10 days  Then 2 weeks later she had bleeding for two weeks  Was changing a maxi pad every 2 hours  Bleeding was very heavy  Some mild cramping with menses  Pt also noticing headaches with menses  OB History     G 4 P 3  Contraception: Was on OCP in past  Practices monthly SBEs, no breast complaints today  Last Mammogram Never had one   Colonoscopy 2018 had precancerous polyp removed, had 6 month f/u and now ok for 2 years   Denies any bowel or bladder issues  Pt follows with PCP for regular check-ups and blood work  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[place] [address]    Genitourinary: Positive for menstrual problem  All other systems reviewed and are negative        The following portions of the patient's history were reviewed and updated as appropriate: allergies, current medications, past family history, past medical history, past social history, past surgical history and problem list     Past Medical History:   Diagnosis Date    Concussion with no loss of consciousness 10/02/2015    resolved 06/09/2017    GERD (gastroesophageal reflux disease)        Past Surgical History:   Procedure Laterality Date    NO PAST SURGERIES      PA COLONOSCOPY FLX DX W/COLLJ SPEC WHEN PFRMD N/A 4/6/2018    Procedure: EGD AND COLONOSCOPY;  Surgeon: Margaret Chery Awilda Rodríguez MD;  [facility]; Service: Gastroenterology    ID COLONOSCOPY FLX DX W/COLLJ SPEC WHEN PFRMD N/A 2018    Procedure: COLONOSCOPY;  Surgeon: Jackie Barclay MD;  [facility];   Service: Gastroenterology       Family History   Problem Relation Age of Onset    Hypertension Father        Social History     Socioeconomic History    Marital status: /Civil Union     Spouse name: Not on file    Number of children: Not on file    Years of education: Not on file    Highest education level: Not on file   Occupational History    Not on file   Social Needs    Financial resource strain: Not on file    Food insecurity:     Worry: Not on file     Inability: Not on file    Transportation needs:     Medical: Not on file     Non-medical: Not on file   Tobacco Use    Smoking status: Former Smoker     Last attempt to quit: 3/19/2014     Years since quittin 4    Smokeless tobacco: Never Used   Substance and Sexual Activity    Alcohol use: Yes     Frequency: 2-4 times a month     Drinks per session: 1 or 2     Comment: social    Drug use: No    Sexual activity: Yes     Partners: Male     Comment: declines STD testing    Lifestyle    Physical activity:     Days per week: Not on file     Minutes per session: Not on file    Stress: Not on file   Relationships    Social connections:     Talks on phone: Not on file     Gets together: Not on file     Attends Mormon service: Not on file     Active member of club or organization: Not on file     Attends meetings of clubs or organizations: Not on file     Relationship status: Not on file    Intimate partner violence:     Fear of current or ex partner: Not on file     Emotionally abused: Not on file     Physically abused: Not on file     Forced sexual activity: Not on file   Other Topics Concern    Not on file   Social History Narrative    Not on file         Current Outpatient Medications:     Na Sulfate-K Sulfate-Mg Sulf 17 5-3 13-1 6 GM/180ML SOLN, Take 1 kit by mouth once for 1 dose (Patient not taking: Reported on 12/19/2018 ), Disp: 2 Bottle, Rfl: 0    norethindrone-ethinyl estradiol (MICROGESTIN 1/20) 1-20 MG-MCG per tablet, Take 1 tablet by mouth daily, Disp: 84 tablet, Rfl: 0    No Known Allergies    Objective   Vitals:    08/28/19 0927   BP: 120/82   Weight: 99 3 kg (219 lb)     Physical Exam   Constitutional: She is oriented to person, place, and time  She appears well-developed and well-nourished  HENT:   Head: Normocephalic  Neck: Normal range of motion  Neck supple  No tracheal deviation present  No thyromegaly present  Cardiovascular: Normal rate, regular rhythm and normal heart sounds  Pulmonary/Chest: Effort normal and breath sounds normal  Right breast exhibits no inverted nipple, no mass, no nipple discharge, no skin change and no tenderness  Left breast exhibits no inverted nipple, no mass, no nipple discharge, no skin change and no tenderness  No breast tenderness, discharge or bleeding  Breasts are symmetrical    Abdominal: Soft  Bowel sounds are normal  She exhibits no distension and no mass  There is no tenderness  There is no rebound and no guarding  Genitourinary: Vagina normal and uterus normal  No breast tenderness, discharge or bleeding  No labial fusion  There is no rash, tenderness, lesion or injury on the right labia  There is no rash, tenderness, lesion or injury on the left labia  Cervix exhibits no motion tenderness, no discharge and no friability  Right adnexum displays no mass, no tenderness and no fullness  Left adnexum displays no mass, no tenderness and no fullness  Musculoskeletal: Normal range of motion  Neurological: She is alert and oriented to person, place, and time  Skin: Skin is warm and dry  Psychiatric: She has a normal mood and affect   Her behavior is normal  Judgment and thought content normal

## 2019-08-30 LAB
HPV HR 12 DNA CVX QL NAA+PROBE: NEGATIVE
HPV16 DNA CVX QL NAA+PROBE: NEGATIVE
HPV18 DNA CVX QL NAA+PROBE: NEGATIVE

## 2019-08-30 NOTE — TELEPHONE ENCOUNTER
Dr Rubi Newman pt called stating she need colon prep and instructions  Pt is sched for 06/25/18  Email instructions to [email or web address] Speaking Coherently

## 2019-09-03 LAB
LAB AP GYN PRIMARY INTERPRETATION: NORMAL
LAB AP LMP: NORMAL
Lab: NORMAL

## 2020-02-03 ENCOUNTER — CLINICAL SUPPORT (OUTPATIENT)
Dept: FAMILY MEDICINE CLINIC | Facility: CLINIC | Age: 50
End: 2020-02-03
Payer: COMMERCIAL

## 2020-02-03 DIAGNOSIS — Z11.1 ENCOUNTER FOR PPD TEST: Primary | ICD-10-CM

## 2020-02-03 PROCEDURE — 86580 TB INTRADERMAL TEST: CPT

## 2020-02-05 ENCOUNTER — CLINICAL SUPPORT (OUTPATIENT)
Dept: FAMILY MEDICINE CLINIC | Facility: CLINIC | Age: 50
End: 2020-02-05

## 2020-02-05 DIAGNOSIS — Z11.1 ENCOUNTER FOR PPD SKIN TEST READING: Primary | ICD-10-CM

## 2020-02-05 LAB
INDURATION: 0 MM
TB SKIN TEST: NEGATIVE

## 2020-03-03 ENCOUNTER — HOSPITAL ENCOUNTER (EMERGENCY)
Facility: HOSPITAL | Age: 50
Discharge: HOME | End: 2020-03-03
Attending: EMERGENCY MEDICINE
Payer: COMMERCIAL

## 2020-03-03 VITALS
HEART RATE: 90 BPM | OXYGEN SATURATION: 96 % | TEMPERATURE: 99.6 F | WEIGHT: 210 LBS | HEIGHT: 66 IN | DIASTOLIC BLOOD PRESSURE: 98 MMHG | SYSTOLIC BLOOD PRESSURE: 155 MMHG | BODY MASS INDEX: 33.75 KG/M2 | RESPIRATION RATE: 16 BRPM

## 2020-03-03 DIAGNOSIS — R04.0 EPISTAXIS: Primary | ICD-10-CM

## 2020-03-03 PROCEDURE — 99283 EMERGENCY DEPT VISIT LOW MDM: CPT

## 2020-03-03 PROCEDURE — 99282 EMERGENCY DEPT VISIT SF MDM: CPT | Performed by: EMERGENCY MEDICINE

## 2020-03-03 RX ORDER — OXYMETAZOLINE HYDROCHLORIDE 0.05 G/100ML
2 SPRAY NASAL ONCE
Status: COMPLETED | OUTPATIENT
Start: 2020-03-03 | End: 2020-03-03

## 2020-03-03 RX ADMIN — OXYMETAZOLINE HYDROCHLORIDE 2 SPRAY: 0.05 SPRAY NASAL at 10:29

## 2020-03-03 NOTE — ED PROVIDER NOTES
History  Chief Complaint   Patient presents with    Nose Bleed     Pt c/o nosebleed, tampons in bothe nostrils from home  Pt continues to bleed  Patient has been having mild intermittent epistaxis mostly from the left side over the course of the weekend  She denies any trauma to the nose  She has dry secretions due to the heat  She had no fever no headache  Today in the shower she blew her nose with significant of bleeding started  Patient has placed 2 tampons in her nostrils prior to arrival with control of bleeding  She is on no blood thinners          Prior to Admission Medications   Prescriptions Last Dose Informant Patient Reported? Taking? Na Sulfate-K Sulfate-Mg Sulf 17 5-3 13-1 6 GM/180ML SOLN   No No   Sig: Take 1 kit by mouth once for 1 dose   Patient not taking: Reported on 2018    norethindrone-ethinyl estradiol (Flores Vigil ) 1-20 MG-MCG per tablet   No No   Sig: Take 1 tablet by mouth daily      Facility-Administered Medications: None       Past Medical History:   Diagnosis Date    Concussion with no loss of consciousness 10/02/2015    resolved 2017    GERD (gastroesophageal reflux disease)        Past Surgical History:   Procedure Laterality Date    NO PAST SURGERIES      TX COLONOSCOPY FLX DX W/COLLJ SPEC WHEN PFRMD N/A 2018    Procedure: EGD AND COLONOSCOPY;  Surgeon: China Glover MD;  Location: [facility]; Service: Gastroenterology    TX COLONOSCOPY FLX DX W/COLLJ SPEC WHEN PFRMD N/A 2018    Procedure: COLONOSCOPY;  Surgeon: China Glover MD;  Location: [facility]; Service: Gastroenterology       Family History   Problem Relation Age of Onset    Hypertension Father      I have reviewed and agree with the history as documented      E-Cigarette/Vaping     E-Cigarette/Vaping Substances     Social History     Tobacco Use    Smoking status: Former Smoker     Last attempt to quit: 3/19/2014     Years since quittin 9    Smokeless tobacco: Never Used   Substance Use Topics    Alcohol use: Yes     Frequency: 2-4 times a month     Drinks per session: 1 or 2     Comment: social    Drug use: No       Review of Systems   Constitutional: Negative for fever  HENT: Positive for congestion, nosebleeds and postnasal drip  Negative for sore throat  Respiratory: Negative for shortness of breath  Cardiovascular: Negative for chest pain  Gastrointestinal: Negative for abdominal pain  Genitourinary: Negative for dysuria  Musculoskeletal: Negative for neck pain  Skin: Negative for rash and wound  Neurological: Negative for headaches  Hematological: Does not bruise/bleed easily  Psychiatric/Behavioral: Negative for confusion  All other systems reviewed and are negative  Physical Exam  Physical Exam   Constitutional: She appears well-developed and well-nourished  HENT:   Head: Normocephalic and atraumatic  Mouth/Throat: Oropharynx is clear and moist    Patient has some clot in the pharynx  There is bleeding from the inferior portion of the left nares  Eyes: Conjunctivae are normal    Neck: Normal range of motion  Neck supple  Cardiovascular: Normal rate and regular rhythm  Pulmonary/Chest: Effort normal    Abdominal: Soft  There is no tenderness  Musculoskeletal: Normal range of motion  Neurological: She is alert  Skin: Skin is warm and dry  Capillary refill takes less than 2 seconds  Psychiatric: She has a normal mood and affect  Her behavior is normal    Nursing note and vitals reviewed        Vital Signs  ED Triage Vitals [03/03/20 1025]   Temperature Pulse Respirations Blood Pressure SpO2   99 6 °F (37 6 °C) (!) 115 16 (!) 190/100 99 %      Temp Source Heart Rate Source Patient Position - Orthostatic VS BP Location FiO2 (%)   Tympanic Monitor Sitting Right arm --      Pain Score       No Pain           Vitals:    03/03/20 1025 03/03/20 1132   BP: (!) 190/100 155/98   Pulse: (!) 115 90   Patient Position - Orthostatic VS: Sitting Sitting         Visual Acuity      ED Medications  Medications   oxymetazoline (AFRIN) 0 05 % nasal spray 2 spray (2 sprays Each Nare Given 3/3/20 1029)       Diagnostic Studies  Results Reviewed     None                 No orders to display              Procedures  Procedures         ED Course                               MDM  Number of Diagnoses or Management Options  Epistaxis:   Diagnosis management comments: I was able to achieve good hemostasis with Afrin soaked cotton gauze and pressure  A rapid rhino was inserted and taped to the side of her face  Patient will follow-up for packing removal        Disposition  Final diagnoses:   Epistaxis     Time reflects when diagnosis was documented in both MDM as applicable and the Disposition within this note     Time User Action Codes Description Comment    3/3/2020 12:05 PM Guzman Yeu Add [R04 0] Epistaxis       ED Disposition     ED Disposition Condition Date/Time Comment    Discharge Stable Tue Mar 3, 2020 12:05 PM Mathis Babinski discharge to home/self care  Follow-up Information     Follow up With Specialties Details Why Contact Info Additional Vance 18, DO Family Medicine   [address] [place], [place] [id number]  [address] [facility] Emergency Department Emergency Medicine Go in 2 days Packing removal 49 [facility]  [phone] [facility] ED, [facility], [address]          Patient's Medications   Discharge Prescriptions    No medications on file     No discharge procedures on file      PDMP Review     None          ED Provider  Electronically Signed by           Venus Jones MD  03/03/20 5023

## 2020-07-31 ENCOUNTER — TELEPHONE (OUTPATIENT)
Dept: GASTROENTEROLOGY | Facility: AMBULARY SURGERY CENTER | Age: 50
End: 2020-07-31

## 2020-12-03 ENCOUNTER — HOSPITAL ENCOUNTER (EMERGENCY)
Facility: HOSPITAL | Age: 50
Discharge: HOME | End: 2020-12-03
Attending: EMERGENCY MEDICINE
Payer: COMMERCIAL

## 2020-12-03 ENCOUNTER — APPOINTMENT (EMERGENCY)
Dept: RADIOLOGY | Facility: HOSPITAL | Age: 50
End: 2020-12-03
Payer: COMMERCIAL

## 2020-12-03 VITALS
RESPIRATION RATE: 17 BRPM | OXYGEN SATURATION: 96 % | HEART RATE: 72 BPM | DIASTOLIC BLOOD PRESSURE: 85 MMHG | SYSTOLIC BLOOD PRESSURE: 146 MMHG | WEIGHT: 210 LBS | TEMPERATURE: 97.3 F | BODY MASS INDEX: 33.89 KG/M2

## 2020-12-03 DIAGNOSIS — S46.812A STRAIN OF LEFT TRAPEZIUS MUSCLE, INITIAL ENCOUNTER: Primary | ICD-10-CM

## 2020-12-03 LAB
ALBUMIN SERPL BCP-MCNC: 3.8 G/DL (ref 3.5–5)
ALP SERPL-CCNC: 71 U/L (ref 46–116)
ALT SERPL W P-5'-P-CCNC: 29 U/L (ref 12–78)
ANION GAP SERPL CALCULATED.3IONS-SCNC: 10 MMOL/L (ref 4–13)
APTT PPP: 31 SECONDS (ref 23–37)
AST SERPL W P-5'-P-CCNC: 15 U/L (ref 5–45)
BASOPHILS # BLD AUTO: 0.07 THOUSANDS/ΜL (ref 0–0.1)
BASOPHILS NFR BLD AUTO: 1 % (ref 0–1)
BILIRUB SERPL-MCNC: 0.4 MG/DL (ref 0.2–1)
BUN SERPL-MCNC: 14 MG/DL (ref 5–25)
CALCIUM SERPL-MCNC: 9.5 MG/DL (ref 8.3–10.1)
CHLORIDE SERPL-SCNC: 104 MMOL/L (ref 100–108)
CO2 SERPL-SCNC: 25 MMOL/L (ref 21–32)
CREAT SERPL-MCNC: 0.85 MG/DL (ref 0.6–1.3)
EOSINOPHIL # BLD AUTO: 0.22 THOUSAND/ΜL (ref 0–0.61)
EOSINOPHIL NFR BLD AUTO: 3 % (ref 0–6)
ERYTHROCYTE [DISTWIDTH] IN BLOOD BY AUTOMATED COUNT: 14 % (ref 11.6–15.1)
GFR SERPL CREATININE-BSD FRML MDRD: 80 ML/MIN/1.73SQ M
GLUCOSE SERPL-MCNC: 102 MG/DL (ref 65–140)
HCT VFR BLD AUTO: 44.8 % (ref 34.8–46.1)
HGB BLD-MCNC: 13.7 G/DL (ref 11.5–15.4)
IMM GRANULOCYTES # BLD AUTO: 0.03 THOUSAND/UL (ref 0–0.2)
IMM GRANULOCYTES NFR BLD AUTO: 0 % (ref 0–2)
INR PPP: 0.96 (ref 0.84–1.19)
LYMPHOCYTES # BLD AUTO: 2.07 THOUSANDS/ΜL (ref 0.6–4.47)
LYMPHOCYTES NFR BLD AUTO: 23 % (ref 14–44)
MCH RBC QN AUTO: 25.1 PG (ref 26.8–34.3)
MCHC RBC AUTO-ENTMCNC: 30.6 G/DL (ref 31.4–37.4)
MCV RBC AUTO: 82 FL (ref 82–98)
MONOCYTES # BLD AUTO: 0.55 THOUSAND/ΜL (ref 0.17–1.22)
MONOCYTES NFR BLD AUTO: 6 % (ref 4–12)
NEUTROPHILS # BLD AUTO: 5.95 THOUSANDS/ΜL (ref 1.85–7.62)
NEUTS SEG NFR BLD AUTO: 67 % (ref 43–75)
NRBC BLD AUTO-RTO: 0 /100 WBCS
NT-PROBNP SERPL-MCNC: 29 PG/ML
PLATELET # BLD AUTO: 249 THOUSANDS/UL (ref 149–390)
PMV BLD AUTO: 10.7 FL (ref 8.9–12.7)
POTASSIUM SERPL-SCNC: 4.2 MMOL/L (ref 3.5–5.3)
PROT SERPL-MCNC: 7.2 G/DL (ref 6.4–8.2)
PROTHROMBIN TIME: 12.7 SECONDS (ref 11.6–14.5)
RBC # BLD AUTO: 5.46 MILLION/UL (ref 3.81–5.12)
SODIUM SERPL-SCNC: 139 MMOL/L (ref 136–145)
TROPONIN I SERPL-MCNC: <0.02 NG/ML
TROPONIN I SERPL-MCNC: <0.02 NG/ML
WBC # BLD AUTO: 8.89 THOUSAND/UL (ref 4.31–10.16)

## 2020-12-03 PROCEDURE — 99285 EMERGENCY DEPT VISIT HI MDM: CPT | Performed by: EMERGENCY MEDICINE

## 2020-12-03 PROCEDURE — 93005 ELECTROCARDIOGRAM TRACING: CPT

## 2020-12-03 PROCEDURE — 85610 PROTHROMBIN TIME: CPT | Performed by: EMERGENCY MEDICINE

## 2020-12-03 PROCEDURE — 85025 COMPLETE CBC W/AUTO DIFF WBC: CPT | Performed by: EMERGENCY MEDICINE

## 2020-12-03 PROCEDURE — 96374 THER/PROPH/DIAG INJ IV PUSH: CPT

## 2020-12-03 PROCEDURE — 83880 ASSAY OF NATRIURETIC PEPTIDE: CPT | Performed by: EMERGENCY MEDICINE

## 2020-12-03 PROCEDURE — 85730 THROMBOPLASTIN TIME PARTIAL: CPT | Performed by: EMERGENCY MEDICINE

## 2020-12-03 PROCEDURE — 71045 X-RAY EXAM CHEST 1 VIEW: CPT

## 2020-12-03 PROCEDURE — 36415 COLL VENOUS BLD VENIPUNCTURE: CPT | Performed by: EMERGENCY MEDICINE

## 2020-12-03 PROCEDURE — 84484 ASSAY OF TROPONIN QUANT: CPT | Performed by: EMERGENCY MEDICINE

## 2020-12-03 PROCEDURE — 99284 EMERGENCY DEPT VISIT MOD MDM: CPT

## 2020-12-03 PROCEDURE — 80053 COMPREHEN METABOLIC PANEL: CPT | Performed by: EMERGENCY MEDICINE

## 2020-12-03 RX ORDER — DIAZEPAM 5 MG/ML
2.5 INJECTION, SOLUTION INTRAMUSCULAR; INTRAVENOUS ONCE
Status: COMPLETED | OUTPATIENT
Start: 2020-12-03 | End: 2020-12-03

## 2020-12-03 RX ORDER — LIDOCAINE 50 MG/G
PATCH TOPICAL
Status: DISPENSED
Start: 2020-12-03 | End: 2020-12-04

## 2020-12-03 RX ORDER — LIDOCAINE 50 MG/G
1 PATCH TOPICAL DAILY
Qty: 15 PATCH | Refills: 0 | Status: SHIPPED | OUTPATIENT
Start: 2020-12-03

## 2020-12-03 RX ORDER — LIDOCAINE 50 MG/G
1 PATCH TOPICAL ONCE
Status: DISCONTINUED | OUTPATIENT
Start: 2020-12-03 | End: 2020-12-03 | Stop reason: HOSPADM

## 2020-12-03 RX ORDER — DIAZEPAM 5 MG/1
2.5 TABLET ORAL EVERY 8 HOURS PRN
Qty: 10 TABLET | Refills: 0 | Status: SHIPPED | OUTPATIENT
Start: 2020-12-03 | End: 2020-12-13

## 2020-12-03 RX ADMIN — DIAZEPAM 2.5 MG: 10 INJECTION, SOLUTION INTRAMUSCULAR; INTRAVENOUS at 10:11

## 2020-12-03 RX ADMIN — LIDOCAINE 1 PATCH: 50 PATCH TOPICAL at 13:45

## 2020-12-04 LAB
ATRIAL RATE: 72 BPM
P AXIS: 58 DEGREES
PR INTERVAL: 170 MS
QRS AXIS: 9 DEGREES
QRSD INTERVAL: 70 MS
QT INTERVAL: 400 MS
QTC INTERVAL: 438 MS
T WAVE AXIS: 47 DEGREES
VENTRICULAR RATE: 72 BPM

## 2020-12-04 PROCEDURE — 93010 ELECTROCARDIOGRAM REPORT: CPT | Performed by: INTERNAL MEDICINE

## 2020-12-21 ENCOUNTER — IMMUNIZATIONS (OUTPATIENT)
Dept: FAMILY MEDICINE CLINIC | Facility: HOSPITAL | Age: 50
End: 2020-12-21
Payer: COMMERCIAL

## 2020-12-21 DIAGNOSIS — Z23 ENCOUNTER FOR IMMUNIZATION: ICD-10-CM

## 2020-12-21 PROCEDURE — 91301 SARS-COV-2 / COVID-19 MRNA VACCINE (MODERNA) 100 MCG: CPT

## 2020-12-21 PROCEDURE — 0011A SARS-COV-2 / COVID-19 MRNA VACCINE (MODERNA) 100 MCG: CPT

## 2020-12-29 ENCOUNTER — OFFICE VISIT (OUTPATIENT)
Dept: FAMILY MEDICINE CLINIC | Facility: CLINIC | Age: 50
End: 2020-12-29
Payer: COMMERCIAL

## 2020-12-29 VITALS
RESPIRATION RATE: 18 BRPM | HEIGHT: 65 IN | DIASTOLIC BLOOD PRESSURE: 80 MMHG | OXYGEN SATURATION: 98 % | WEIGHT: 214 LBS | TEMPERATURE: 97.8 F | BODY MASS INDEX: 35.65 KG/M2 | SYSTOLIC BLOOD PRESSURE: 124 MMHG | HEART RATE: 70 BPM

## 2020-12-29 DIAGNOSIS — L30.9 DERMATITIS: Primary | ICD-10-CM

## 2020-12-29 DIAGNOSIS — Z12.31 ENCOUNTER FOR SCREENING MAMMOGRAM FOR BREAST CANCER: ICD-10-CM

## 2020-12-29 PROCEDURE — 99213 OFFICE O/P EST LOW 20 MIN: CPT | Performed by: FAMILY MEDICINE

## 2020-12-29 RX ORDER — CEPHALEXIN 500 MG/1
500 CAPSULE ORAL EVERY 12 HOURS SCHEDULED
Qty: 14 CAPSULE | Refills: 0 | Status: SHIPPED | OUTPATIENT
Start: 2020-12-29 | End: 2021-01-05

## 2021-01-21 ENCOUNTER — IMMUNIZATIONS (OUTPATIENT)
Dept: FAMILY MEDICINE CLINIC | Facility: HOSPITAL | Age: 51
End: 2021-01-21

## 2021-01-21 DIAGNOSIS — Z23 ENCOUNTER FOR IMMUNIZATION: Primary | ICD-10-CM

## 2021-01-21 PROCEDURE — 0011A SARS-COV-2 / COVID-19 MRNA VACCINE (MODERNA) 100 MCG: CPT

## 2021-01-21 PROCEDURE — 91301 SARS-COV-2 / COVID-19 MRNA VACCINE (MODERNA) 100 MCG: CPT

## 2021-02-08 ENCOUNTER — OFFICE VISIT (OUTPATIENT)
Dept: FAMILY MEDICINE CLINIC | Facility: CLINIC | Age: 51
End: 2021-02-08
Payer: COMMERCIAL

## 2021-02-08 VITALS
SYSTOLIC BLOOD PRESSURE: 110 MMHG | DIASTOLIC BLOOD PRESSURE: 82 MMHG | OXYGEN SATURATION: 97 % | HEART RATE: 78 BPM | WEIGHT: 217 LBS | RESPIRATION RATE: 18 BRPM | HEIGHT: 65 IN | BODY MASS INDEX: 36.15 KG/M2 | TEMPERATURE: 97.8 F

## 2021-02-08 DIAGNOSIS — Z23 NEED FOR VACCINATION: ICD-10-CM

## 2021-02-08 DIAGNOSIS — Z13.6 SCREENING FOR CARDIOVASCULAR CONDITION: ICD-10-CM

## 2021-02-08 DIAGNOSIS — Z00.00 ANNUAL PHYSICAL EXAM: Primary | ICD-10-CM

## 2021-02-08 DIAGNOSIS — Z13.0 SCREENING FOR DEFICIENCY ANEMIA: ICD-10-CM

## 2021-02-08 DIAGNOSIS — Z12.31 SCREENING MAMMOGRAM, ENCOUNTER FOR: ICD-10-CM

## 2021-02-08 PROCEDURE — 90750 HZV VACC RECOMBINANT IM: CPT

## 2021-02-08 PROCEDURE — 90471 IMMUNIZATION ADMIN: CPT

## 2021-02-08 PROCEDURE — 99396 PREV VISIT EST AGE 40-64: CPT | Performed by: FAMILY MEDICINE

## 2021-02-08 PROCEDURE — 86580 TB INTRADERMAL TEST: CPT

## 2021-02-08 NOTE — PROGRESS NOTES
FAMILY PRACTICE HEALTH MAINTENANCE OFFICE VISIT  [facility]    NAME: Robert Kennedy  AGE: 48 y o  SEX: female  : 1970     DATE: 2021    Assessment and Plan     1  Annual physical exam    2  Screening for cardiovascular condition  -     Comprehensive metabolic panel; Future  -     Lipid Panel with Direct LDL reflex; Future    3  Screening for deficiency anemia  -     CBC; Future    4  Screening mammogram, encounter for  -     Mammo screening bilateral w 3d & cad; Future; Expected date: 2021    5  Need for vaccination  -     Zoster Vaccine Recombinant IM  -     TB Skin Test        · Patient Counseling:   · Nutrition: Stressed importance of a well balanced diet, moderation of sodium/saturated fat, caloric balance and sufficient intake of fiber  · Exercise: Stressed the importance of regular exercise with a goal of 150 minutes per week  · Dental Health: Discussed daily flossing and brushing and regular dental visits     · Immunizations reviewed: See Orders  · Discussed benefits of:  Mammogram  and Screening labs   BMI Counseling: Body mass index is 36 11 kg/m²  Discussed with patient's BMI with her  The BMI is above normal  Exercise recommendations include exercising 3-5 times per week  Return in about 1 year (around 2022) for Annual physical and needs 2 nursing appointments - 2nd [facility] in 2 months and PPD read   Chief Complaint     Chief Complaint   Patient presents with    Physical Exam     [email or web address]       History of Present Illness     She is feeling well  She has not been exercising as much due to cold weather         Well Adult Physical   Patient here for a comprehensive physical exam       Diet and Physical Activity  Diet: well balanced diet  Exercise: infrequently      Depression Screen  PHQ-9 Depression Screening    PHQ-9:   Frequency of the following problems over the past two weeks:      Little interest or pleasure in doing things: 0 - not at all  Feeling down, depressed, or hopeless: 0 - not at all  PHQ-2 Score: 0          General Health  Hearing: Normal:  bilateral  Vision: wears glasses  Dental: regular dental visits    Reproductive Health  Irregular Periods and Follows with gynecologist      The following portions of the patient's history were reviewed and updated as appropriate: allergies, current medications, past family history, past medical history, past social history, past surgical history and problem list     Review of Systems     Review of Systems   Respiratory: Negative  Cardiovascular: Negative  Past Medical History     Past Medical History:   Diagnosis Date    Concussion with no loss of consciousness 10/02/2015    resolved 2017    GERD (gastroesophageal reflux disease)        Past Surgical History     Past Surgical History:   Procedure Laterality Date    NO PAST SURGERIES      DC COLONOSCOPY FLX DX W/COLLJ SPEC WHEN PFRMD N/A 2018    Procedure: EGD AND COLONOSCOPY;  Surgeon: Ericka Zayas MD;  Location: [facility]; Service: Gastroenterology    DC COLONOSCOPY FLX DX W/COLLJ SPEC WHEN PFRMD N/A 2018    Procedure: COLONOSCOPY;  Surgeon: Ericka Zayas MD;  Location: [facility];   Service: Gastroenterology       Social History     Social History     Socioeconomic History    Marital status: /Civil Union     Spouse name: None    Number of children: None    Years of education: None    Highest education level: None   Occupational History    None   Social Needs    Financial resource strain: None    Food insecurity     Worry: None     Inability: None    Transportation needs     Medical: None     Non-medical: None   Tobacco Use    Smoking status: Former Smoker     Quit date: 3/19/2014     Years since quittin 8    Smokeless tobacco: Never Used   Substance and Sexual Activity    Alcohol use: Yes     Frequency: 2-4 times a month     Drinks per session: 1 or 2     Comment: social    Drug use: No    Sexual activity: Yes     Partners: Male     Comment: declines STD testing    Lifestyle    Physical activity     Days per week: None     Minutes per session: None    Stress: None   Relationships    Social connections     Talks on phone: None     Gets together: None     Attends [employer] service: None     Active member of club or organization: None     Attends meetings of clubs or organizations: None     Relationship status: None    Intimate partner violence     Fear of current or ex partner: None     Emotionally abused: None     Physically abused: None     Forced sexual activity: None   Other Topics Concern    None   Social History Narrative    None       Family History     Family History   Problem Relation Age of Onset    Hypertension Father     Lung cancer Father     Lung cancer Mother     Lung cancer Maternal Aunt        Current Medications       Current Outpatient Medications:     diazepam (VALIUM) 5 mg tablet, Take 0 5 tablets (2 5 mg total) by mouth every 8 (eight) hours as needed for muscle spasms for up to 10 days (Patient not taking: Reported on 2/8/2021), Disp: 10 tablet, Rfl: 0    lidocaine (LIDODERM) 5 %, Apply 1 patch topically daily Remove & Discard patch within 12 hours or as directed by MD (Patient not taking: Reported on 12/29/2020), Disp: 15 patch, Rfl: 0     Allergies     No Known Allergies    Objective     /82   Pulse 78   Temp 97 8 °F (36 6 °C)   Resp 18   Ht 5' 5" (1 651 m)   Wt 98 4 kg (217 lb)   SpO2 97%   BMI 36 11 kg/m²      Physical Exam  Vitals signs and nursing note reviewed  Constitutional:       Appearance: She is well-developed  HENT:      Head: Normocephalic and atraumatic  Right Ear: Tympanic membrane and external ear normal       Left Ear: Tympanic membrane and external ear normal    Eyes:      Pupils: Pupils are equal, round, and reactive to light  Neck:      Musculoskeletal: Normal range of motion     Cardiovascular:      Rate and Rhythm: Normal rate and regular rhythm  Heart sounds: Normal heart sounds  No murmur  No friction rub  Pulmonary:      Effort: Pulmonary effort is normal  No respiratory distress  Breath sounds: Normal breath sounds  No wheezing or rales  Abdominal:      General: Bowel sounds are normal  There is no distension  Palpations: Abdomen is soft  There is no mass  Tenderness: There is no abdominal tenderness  There is no guarding or rebound  Musculoskeletal:      Right lower leg: No edema  Left lower leg: No edema  Skin:     General: Skin is warm  Neurological:      Mental Status: She is alert and oriented to person, place, and time              Visual Acuity Screening    Right eye Left eye Both eyes   Without correction:      With correction: 20/40 20/40 20/30           Dang Brand, [address]

## 2021-02-11 ENCOUNTER — CLINICAL SUPPORT (OUTPATIENT)
Dept: FAMILY MEDICINE CLINIC | Facility: CLINIC | Age: 51
End: 2021-02-11

## 2021-02-11 DIAGNOSIS — Z23 ENCOUNTER FOR IMMUNIZATION: Primary | ICD-10-CM

## 2021-02-11 DIAGNOSIS — Z11.1 ENCOUNTER FOR PPD SKIN TEST READING: ICD-10-CM

## 2021-02-11 LAB
INDURATION: 0 MM
TB SKIN TEST: NEGATIVE

## 2021-02-16 DIAGNOSIS — Z11.9 ENCOUNTER FOR SCREENING FOR INFECTIOUS AND PARASITIC DISEASES, UNSPECIFIED: Primary | ICD-10-CM

## 2021-02-28 DIAGNOSIS — Z11.9 ENCOUNTER FOR SCREENING FOR INFECTIOUS AND PARASITIC DISEASES, UNSPECIFIED: ICD-10-CM

## 2021-02-28 PROCEDURE — U0005 INFEC AGEN DETEC AMPLI PROBE: HCPCS | Performed by: FAMILY MEDICINE

## 2021-02-28 PROCEDURE — U0003 INFECTIOUS AGENT DETECTION BY NUCLEIC ACID (DNA OR RNA); SEVERE ACUTE RESPIRATORY SYNDROME CORONAVIRUS 2 (SARS-COV-2) (CORONAVIRUS DISEASE [COVID-19]), AMPLIFIED PROBE TECHNIQUE, MAKING USE OF HIGH THROUGHPUT TECHNOLOGIES AS DESCRIBED BY CMS-2020-01-R: HCPCS | Performed by: FAMILY MEDICINE

## 2021-03-01 LAB — SARS-COV-2 RNA RESP QL NAA+PROBE: NEGATIVE

## 2021-03-10 DIAGNOSIS — Z20.822 EXPOSURE TO COVID-19 VIRUS: Primary | ICD-10-CM

## 2021-03-12 DIAGNOSIS — Z20.822 EXPOSURE TO COVID-19 VIRUS: ICD-10-CM

## 2021-03-12 PROCEDURE — U0003 INFECTIOUS AGENT DETECTION BY NUCLEIC ACID (DNA OR RNA); SEVERE ACUTE RESPIRATORY SYNDROME CORONAVIRUS 2 (SARS-COV-2) (CORONAVIRUS DISEASE [COVID-19]), AMPLIFIED PROBE TECHNIQUE, MAKING USE OF HIGH THROUGHPUT TECHNOLOGIES AS DESCRIBED BY CMS-2020-01-R: HCPCS | Performed by: FAMILY MEDICINE

## 2021-03-12 PROCEDURE — U0005 INFEC AGEN DETEC AMPLI PROBE: HCPCS | Performed by: FAMILY MEDICINE

## 2021-03-13 LAB — SARS-COV-2 RNA RESP QL NAA+PROBE: NEGATIVE

## 2021-04-07 ENCOUNTER — TELEPHONE (OUTPATIENT)
Dept: FAMILY MEDICINE CLINIC | Facility: CLINIC | Age: 51
End: 2021-04-07

## 2021-04-07 DIAGNOSIS — Z23 NEED FOR VACCINATION: Primary | ICD-10-CM

## 2021-04-08 ENCOUNTER — CLINICAL SUPPORT (OUTPATIENT)
Dept: FAMILY MEDICINE CLINIC | Facility: CLINIC | Age: 51
End: 2021-04-08
Payer: COMMERCIAL

## 2021-04-08 DIAGNOSIS — Z23 NEED FOR VACCINATION: Primary | ICD-10-CM

## 2021-04-08 PROCEDURE — 90471 IMMUNIZATION ADMIN: CPT

## 2021-04-08 PROCEDURE — 90750 HZV VACC RECOMBINANT IM: CPT

## 2021-05-15 ENCOUNTER — APPOINTMENT (OUTPATIENT)
Dept: RADIOLOGY | Age: 51
End: 2021-05-15
Payer: COMMERCIAL

## 2021-05-15 ENCOUNTER — OFFICE VISIT (OUTPATIENT)
Dept: URGENT CARE | Age: 51
End: 2021-05-15
Payer: COMMERCIAL

## 2021-05-15 VITALS
OXYGEN SATURATION: 97 % | TEMPERATURE: 97.2 F | SYSTOLIC BLOOD PRESSURE: 150 MMHG | DIASTOLIC BLOOD PRESSURE: 88 MMHG | HEIGHT: 66 IN | WEIGHT: 200 LBS | BODY MASS INDEX: 32.14 KG/M2 | RESPIRATION RATE: 18 BRPM | HEART RATE: 77 BPM

## 2021-05-15 DIAGNOSIS — S93.401A SPRAIN OF RIGHT ANKLE, UNSPECIFIED LIGAMENT, INITIAL ENCOUNTER: ICD-10-CM

## 2021-05-15 DIAGNOSIS — M79.604 PAIN OF RIGHT LOWER EXTREMITY: ICD-10-CM

## 2021-05-15 DIAGNOSIS — S93.401A SPRAIN OF RIGHT ANKLE, UNSPECIFIED LIGAMENT, INITIAL ENCOUNTER: Primary | ICD-10-CM

## 2021-05-15 PROCEDURE — 73590 X-RAY EXAM OF LOWER LEG: CPT

## 2021-05-15 PROCEDURE — 73630 X-RAY EXAM OF FOOT: CPT

## 2021-05-15 NOTE — PROGRESS NOTES
[facility]        NAME: Ady Roberts is a 48 y o  female  : 1970    MRN: [id number]  DATE: May 15, 2021  TIME: 7:19 PM    Assessment and Plan   Sprain of right ankle, unspecified ligament, initial encounter [S93 401A]  1  Sprain of right ankle, unspecified ligament, initial encounter  XR foot 3+ vw right    CANCELED: XR ankle 3+ vw right   2  Pain of right lower extremity  XR tibia fibula 2 vw right         Patient Instructions       Rest, Ice, and Elevate limb  Continue to monitor symptoms  If symptoms do not improve in one week, follow up with orthopedics  Call Jessica 49 [phone] to schedule and appointment  If new or worsening symptoms occur, go immediately to the ER  Chief Complaint     Chief Complaint   Patient presents with    Ankle Pain     Patient c/o R foot and ankle pain after she was hit with a baseball x 3 week ago  History of Present Illness       Ankle Pain   Incident onset: About 3 weeks ago pt was hit in R ankle by a softball  The injury mechanism was a direct blow  Pain location: R foot and lower leg  The quality of the pain is described as aching  The pain is at a severity of 10/10  The pain has been constant since onset  Pertinent negatives include no loss of sensation, muscle weakness, numbness or tingling  The symptoms are aggravated by movement and weight bearing  She has tried ice and NSAIDs for the symptoms  The treatment provided mild relief  Review of Systems   Review of Systems   Constitutional: Negative  Negative for chills, fatigue and fever  HENT: Negative  Eyes: Negative  Respiratory: Negative  Negative for chest tightness, shortness of breath and wheezing  Cardiovascular: Negative  Negative for chest pain and palpitations  Gastrointestinal: Negative for abdominal pain, constipation, diarrhea, nausea and vomiting  Endocrine: Negative  Genitourinary: Negative      Musculoskeletal: Negative for back pain, gait problem, neck pain and neck stiffness  Skin: Negative  Negative for pallor and rash  Allergic/Immunologic: Negative  Neurological: Negative for tingling, weakness and numbness  Hematological: Negative  Psychiatric/Behavioral: Negative  Current Medications       Current Outpatient Medications:     diazepam (VALIUM) 5 mg tablet, Take 0 5 tablets (2 5 mg total) by mouth every 8 (eight) hours as needed for muscle spasms for up to 10 days (Patient not taking: Reported on 2/8/2021), Disp: 10 tablet, Rfl: 0    lidocaine (LIDODERM) 5 %, Apply 1 patch topically daily Remove & Discard patch within 12 hours or as directed by MD (Patient not taking: Reported on 12/29/2020), Disp: 15 patch, Rfl: 0    Current Allergies     Allergies as of 05/15/2021    (No Known Allergies)            The following portions of the patient's history were reviewed and updated as appropriate: allergies, current medications, past family history, past medical history, past social history, past surgical history and problem list      Past Medical History:   Diagnosis Date    Concussion with no loss of consciousness 10/02/2015    resolved 06/09/2017    GERD (gastroesophageal reflux disease)        Past Surgical History:   Procedure Laterality Date    NO PAST SURGERIES      FL COLONOSCOPY FLX DX W/COLLJ SPEC WHEN PFRMD N/A 4/6/2018    Procedure: EGD AND COLONOSCOPY;  Surgeon: Beryle Homme, MD;  Location: [facility]; Service: Gastroenterology    FL COLONOSCOPY FLX DX W/COLLJ SPEC WHEN PFRMD N/A 6/25/2018    Procedure: COLONOSCOPY;  Surgeon: Beryle Homme, MD;  Location: [facility]; Service: Gastroenterology       Family History   Problem Relation Age of Onset    Hypertension Father     Lung cancer Father     Lung cancer Mother     Lung cancer Maternal Aunt          Medications have been verified          Objective   /88   Pulse 77   Temp (!) 97 2 °F (36 2 °C)   Resp 18   Ht 5' 6" (1 676 m)   Wt 90 7 kg (200 lb)   SpO2 97%   BMI 32 28 kg/m²        Physical Exam     Physical Exam  Vitals signs and nursing note reviewed  Constitutional:       General: She is not in acute distress  Appearance: Normal appearance  She is well-developed  She is not ill-appearing or diaphoretic  HENT:      Head: Normocephalic and atraumatic  Neck:      Musculoskeletal: Normal range of motion and neck supple  Cardiovascular:      Rate and Rhythm: Normal rate and regular rhythm  Heart sounds: Normal heart sounds  Pulmonary:      Effort: Pulmonary effort is normal  No respiratory distress  Breath sounds: Normal breath sounds  No wheezing, rhonchi or rales  Musculoskeletal:        Legs:    Lymphadenopathy:      Cervical: No cervical adenopathy  Skin:     General: Skin is warm  Capillary Refill: Capillary refill takes less than 2 seconds  Coloration: Skin is not pale  Findings: No rash  Neurological:      Mental Status: She is alert

## 2021-05-15 NOTE — PATIENT INSTRUCTIONS
Continue to monitor symptoms  If new or worsening symptoms develop, go immediately to Er  Drink plenty of fluids  Follow up with Family Doctor this week  Hematoma   WHAT YOU NEED TO KNOW:   A hematoma is a collection of blood  A bruise is a type of hematoma  A hematoma may form in a muscle or in the tissues just under the skin  A hematoma that forms under the skin will feel like a bump or hard mass  Hematomas can happen anywhere in your body, including in your brain  Your body may break down and absorb a mild hematoma on its own  A more serious hematoma may need treatment  DISCHARGE INSTRUCTIONS:   Medicines: You may need any of the following:  · Prescription pain medicine  may be given  Ask how to take this medicine safely  · NSAIDs , such as ibuprofen, help decrease swelling, pain, and fever  This medicine is available with or without a doctor's order  NSAIDs can cause stomach bleeding or kidney problems in certain people  If you take blood thinner medicine, always ask your healthcare provider if NSAIDs are safe for you  Always read the medicine label and follow directions  · Antibiotics  prevent or treat a bacterial infection  · Take your medicine as directed  Contact your healthcare provider if you think your medicine is not helping or if you have side effects  Tell him of her if you are allergic to any medicine  Keep a list of the medicines, vitamins, and herbs you take  Include the amounts, and when and why you take them  Bring the list or the pill bottles to follow-up visits  Carry your medicine list with you in case of an emergency  Return to the emergency department if:   · You have new or worsening pain, or pain that does not get better with medicine  · You have a fever  · You have trouble moving the body part that has the hematoma  Contact your healthcare provider if:   · You have questions or concerns about your condition or care        Follow up with your healthcare provider as directed: You may need to have surgery if your hematoma is severe  You may also need other tests to make sure there is no other damage that needs to be treated  Write down your questions so you remember to ask them during your visits  Self-care:   · Rest the area  Rest will help your body heal and will also help prevent more damage  · Apply ice as directed  Ice helps reduce swelling  Ice may also help prevent tissue damage  Use an ice pack, or put crushed ice in a bag  Cover it with a towel  Place it on your hematoma for 20 minutes every hour, or as directed  Ask how many times each day to apply ice, and for how many days  · Compress the injury if possible  Lightly wrap the injury with an elastic or soft bandage  This may help control swelling  Ask your healthcare provider how to wrap your injury properly  · Elevate the area as directed  If possible, raise the area above the level of your heart as often as you can  This will help decrease swelling  · Keep the hematoma covered with a bandage  This will help protect the area while it heals  © Copyright [address] Information is for End User's use only and may not be sold, redistributed or otherwise used for commercial purposes  All illustrations and images included in CareNotes® are the copyrighted property of A D A M , [employer]  or [address] [place], [place] [address]charissa   The above information is an  only  It is not intended as medical advice for individual conditions or treatments  Talk to your doctor, nurse or pharmacist before following any medical regimen to see if it is safe and effective for you

## 2021-08-18 ENCOUNTER — APPOINTMENT (OUTPATIENT)
Dept: LAB | Facility: HOSPITAL | Age: 51
End: 2021-08-18
Payer: COMMERCIAL

## 2021-08-18 DIAGNOSIS — Z13.0 SCREENING FOR DEFICIENCY ANEMIA: ICD-10-CM

## 2021-08-18 DIAGNOSIS — Z13.6 SCREENING FOR CARDIOVASCULAR CONDITION: ICD-10-CM

## 2021-08-18 LAB
ALBUMIN SERPL BCP-MCNC: 3.8 G/DL (ref 3.5–5)
ALP SERPL-CCNC: 84 U/L (ref 46–116)
ALT SERPL W P-5'-P-CCNC: 52 U/L (ref 12–78)
ANION GAP SERPL CALCULATED.3IONS-SCNC: 8 MMOL/L (ref 4–13)
AST SERPL W P-5'-P-CCNC: 24 U/L (ref 5–45)
BILIRUB SERPL-MCNC: 0.3 MG/DL (ref 0.2–1)
BUN SERPL-MCNC: 16 MG/DL (ref 5–25)
CALCIUM SERPL-MCNC: 9.7 MG/DL (ref 8.3–10.1)
CHLORIDE SERPL-SCNC: 107 MMOL/L (ref 100–108)
CHOLEST SERPL-MCNC: 212 MG/DL (ref 50–200)
CO2 SERPL-SCNC: 25 MMOL/L (ref 21–32)
CREAT SERPL-MCNC: 0.72 MG/DL (ref 0.6–1.3)
ERYTHROCYTE [DISTWIDTH] IN BLOOD BY AUTOMATED COUNT: 14.1 % (ref 11.6–15.1)
GFR SERPL CREATININE-BSD FRML MDRD: 98 ML/MIN/1.73SQ M
GLUCOSE P FAST SERPL-MCNC: 95 MG/DL (ref 65–99)
HCT VFR BLD AUTO: 43.7 % (ref 34.8–46.1)
HDLC SERPL-MCNC: 42 MG/DL
HGB BLD-MCNC: 13.6 G/DL (ref 11.5–15.4)
LDLC SERPL CALC-MCNC: 141 MG/DL (ref 0–100)
MCH RBC QN AUTO: 26 PG (ref 26.8–34.3)
MCHC RBC AUTO-ENTMCNC: 31.1 G/DL (ref 31.4–37.4)
MCV RBC AUTO: 83 FL (ref 82–98)
PLATELET # BLD AUTO: 282 THOUSANDS/UL (ref 149–390)
PMV BLD AUTO: 11 FL (ref 8.9–12.7)
POTASSIUM SERPL-SCNC: 4.5 MMOL/L (ref 3.5–5.3)
PROT SERPL-MCNC: 7.1 G/DL (ref 6.4–8.2)
RBC # BLD AUTO: 5.24 MILLION/UL (ref 3.81–5.12)
SODIUM SERPL-SCNC: 140 MMOL/L (ref 136–145)
TRIGL SERPL-MCNC: 146 MG/DL
WBC # BLD AUTO: 8.43 THOUSAND/UL (ref 4.31–10.16)

## 2021-08-18 PROCEDURE — 80061 LIPID PANEL: CPT

## 2021-08-18 PROCEDURE — 80053 COMPREHEN METABOLIC PANEL: CPT

## 2021-08-18 PROCEDURE — 85027 COMPLETE CBC AUTOMATED: CPT

## 2021-11-05 PROCEDURE — 91306 COVID-19 MODERNA VACC 0.25 ML BOOSTER: CPT

## 2021-11-09 ENCOUNTER — IMMUNIZATIONS (OUTPATIENT)
Dept: FAMILY MEDICINE CLINIC | Facility: HOSPITAL | Age: 51
End: 2021-11-09

## 2021-11-09 DIAGNOSIS — Z23 ENCOUNTER FOR IMMUNIZATION: Primary | ICD-10-CM

## 2021-11-09 PROCEDURE — 0013A COVID-19 MODERNA VACC 0.25 ML BOOSTER: CPT

## 2022-01-05 ENCOUNTER — HOSPITAL ENCOUNTER (EMERGENCY)
Facility: HOSPITAL | Age: 52
Discharge: HOME | End: 2022-01-05
Payer: COMMERCIAL

## 2022-01-05 VITALS
RESPIRATION RATE: 20 BRPM | TEMPERATURE: 98.1 F | BODY MASS INDEX: 30.49 KG/M2 | HEART RATE: 85 BPM | OXYGEN SATURATION: 96 % | DIASTOLIC BLOOD PRESSURE: 81 MMHG | WEIGHT: 178.57 LBS | HEIGHT: 64 IN | SYSTOLIC BLOOD PRESSURE: 137 MMHG

## 2022-01-05 DIAGNOSIS — B34.9 VIRAL SYNDROME: Primary | ICD-10-CM

## 2022-01-05 LAB
FLUAV RNA RESP QL NAA+PROBE: NEGATIVE
FLUBV RNA RESP QL NAA+PROBE: NEGATIVE
RSV RNA RESP QL NAA+PROBE: NEGATIVE
SARS-COV-2 RNA RESP QL NAA+PROBE: NEGATIVE

## 2022-01-05 PROCEDURE — 0241U HB NFCT DS VIR RESP RNA 4 TRGT: CPT | Performed by: PHYSICIAN ASSISTANT

## 2022-01-05 PROCEDURE — 99283 EMERGENCY DEPT VISIT LOW MDM: CPT | Performed by: PHYSICIAN ASSISTANT

## 2022-01-05 PROCEDURE — 99283 EMERGENCY DEPT VISIT LOW MDM: CPT

## 2022-01-05 NOTE — Clinical Note
Ana Santoyo was seen and treated in our emergency department on 1/5/2022  No work until cleared by Family Doctor/Orthopedics        Diagnosis:     Benítez Tere    She may return on this date:     Pt should quarantine until test is negative  If you have any questions or concerns, please don't hesitate to call        Michael Armstrong PA-C    ______________________________           _______________          _______________  Hospital Representative                              Date                                Time

## 2022-01-05 NOTE — ED PROVIDER NOTES
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 who presents with recent exposure through her 2 children to COVID-19 and  new onset sore throat that started today  Patient states that she has worsening diaphoresis and conjunctival crustiness, and associated dizziness that began on Sunday but have worsened  She states that she took a COVID test on Sunday that was negative  History provided by:  Patient  Flu Symptoms  Presenting symptoms: sore throat    Presenting symptoms comment:  Conjunctival crusting  dizziness  Severity:  Moderate  Onset quality:  Gradual  Duration:  24 hours  Progression:  Worsening  Chronicity:  New  Relieved by:  None tried  Ineffective treatments:  None tried  Associated symptoms: chills and nasal congestion    Associated symptoms comment:  Diaphroesis  Risk factors: sick contacts        Prior to Admission Medications   Prescriptions Last Dose Informant Patient Reported? Taking?   diazepam (VALIUM) 5 mg tablet   No No   Sig: Take 0 5 tablets (2 5 mg total) by mouth every 8 (eight) hours as needed for muscle spasms for up to 10 days   Patient not taking: Reported on 2/8/2021   lidocaine (LIDODERM) 5 %   No No   Sig: Apply 1 patch topically daily Remove & Discard patch within 12 hours or as directed by MD   Patient not taking: Reported on 12/29/2020      Facility-Administered Medications: None       Past Medical History:   Diagnosis Date    Concussion with no loss of consciousness 10/02/2015    resolved 06/09/2017    GERD (gastroesophageal reflux disease)        Past Surgical History:   Procedure Laterality Date    NO PAST SURGERIES      [place] COLONOSCOPY FLX DX W/COLLJ SPEC WHEN PFRMD N/A 4/6/2018    Procedure: EGD AND COLONOSCOPY;  Surgeon: Merly Mcguire MD;  [facility];   Service: Gastroenterology    [place] COLONOSCOPY FLX DX W/COLLJ SPEC WHEN PFRMD N/A 2018    Procedure: COLONOSCOPY;  Surgeon: Bárbara Bishop MD;  [facility]; Service: Gastroenterology       Family History   Problem Relation Age of Onset    Hypertension Father     Lung cancer Father     Lung cancer Mother     Lung cancer Maternal Aunt      I have reviewed and agree with the history as documented  E-Cigarette/Vaping    E-Cigarette Use Never User      E-Cigarette/Vaping Substances     Social History     Tobacco Use    Smoking status: Former Smoker     Quit date: 3/19/2014     Years since quittin 8    Smokeless tobacco: Never Used   Vaping Use    Vaping Use: Never used   Substance Use Topics    Alcohol use: Yes     Comment: social    Drug use: No       Review of Systems   Constitutional: Positive for chills and diaphoresis  HENT: Positive for congestion and sore throat  Neurological: Positive for dizziness  Physical Exam  Physical Exam  Vitals and nursing note reviewed  Constitutional:       General: She is not in acute distress  Appearance: She is well-developed  HENT:      Head: Normocephalic and atraumatic  Eyes:      Conjunctiva/sclera: Conjunctivae normal    Cardiovascular:      Rate and Rhythm: Normal rate and regular rhythm  Heart sounds: No murmur heard  Pulmonary:      Effort: Pulmonary effort is normal  No respiratory distress  Breath sounds: Normal breath sounds  Abdominal:      Palpations: Abdomen is soft  Tenderness: There is no abdominal tenderness  Musculoskeletal:      Cervical back: Neck supple  Skin:     General: Skin is warm and dry  Neurological:      Mental Status: She is alert  Vital Signs  ED Triage Vitals [22 1130]   Temp Pulse Resp BP SpO2   -- -- -- -- --      Temp src Heart Rate Source Patient Position - Orthostatic VS BP Location FiO2 (%)   -- -- -- -- --      Pain Score       2           There were no vitals filed for this visit        Visual Acuity      ED Medications  Medications - No data to display    Diagnostic Studies  Results Reviewed     Procedure Component Value Units Date/Time    COVID/FLU/RSV - 2 hour TAT [[id number]]     Lab Status: No result Specimen: Nares from Nose                  No orders to display              Procedures  Procedures         ED Course                                             MDM  Number of Diagnoses or Management Options  Diagnosis management comments: Rapid Covid pending - will notify patient when resulted  Pt should quarantine until negative result  Amount and/or Complexity of Data Reviewed  Clinical lab tests: ordered    Patient Progress  Patient progress: stable      Disposition  Final diagnoses:   None     ED Disposition     None      Follow-up Information    None         Patient's Medications   Discharge Prescriptions    No medications on file       No discharge procedures on file      PDMP Review     None          ED Provider  Electronically Signed by           Grazyna Sen PA-C  01/05/22 5613

## 2022-02-02 ENCOUNTER — TELEPHONE (OUTPATIENT)
Dept: DERMATOLOGY | Facility: CLINIC | Age: 52
End: 2022-02-02

## 2022-02-02 NOTE — TELEPHONE ENCOUNTER
Pt left v/m wanting to schedule a new pt appt, c/b but n/a, wasn't able to leave a message, called twice and phone just rang and then would hangup

## 2022-03-28 ENCOUNTER — TELEPHONE (OUTPATIENT)
Dept: OBGYN CLINIC | Facility: CLINIC | Age: 52
End: 2022-03-28

## 2022-03-28 NOTE — TELEPHONE ENCOUNTER
Pt called [place] office and scheduled apt for tomorrow  Lmom for patient to call back if needs anything further

## 2022-05-18 ENCOUNTER — HOSPITAL ENCOUNTER (OUTPATIENT)
Dept: MAMMOGRAPHY | Facility: HOSPITAL | Age: 52
Discharge: HOME | End: 2022-05-18
Payer: COMMERCIAL

## 2022-05-18 VITALS — WEIGHT: 178.57 LBS | HEIGHT: 64 IN | BODY MASS INDEX: 30.49 KG/M2

## 2022-05-18 DIAGNOSIS — Z12.31 SCREENING MAMMOGRAM, ENCOUNTER FOR: ICD-10-CM

## 2022-05-18 PROCEDURE — 77067 SCR MAMMO BI INCL CAD: CPT

## 2022-05-18 PROCEDURE — 77063 BREAST TOMOSYNTHESIS BI: CPT

## 2022-07-19 ENCOUNTER — OFFICE VISIT (OUTPATIENT)
Dept: GASTROENTEROLOGY | Facility: AMBULARY SURGERY CENTER | Age: 52
End: 2022-07-19
Payer: COMMERCIAL

## 2022-07-19 VITALS
DIASTOLIC BLOOD PRESSURE: 80 MMHG | HEART RATE: 98 BPM | OXYGEN SATURATION: 98 % | HEIGHT: 64 IN | SYSTOLIC BLOOD PRESSURE: 132 MMHG | WEIGHT: 221 LBS | BODY MASS INDEX: 37.73 KG/M2

## 2022-07-19 DIAGNOSIS — Z86.010 HISTORY OF ADENOMATOUS POLYP OF COLON: Primary | ICD-10-CM

## 2022-07-19 DIAGNOSIS — K22.70 BARRETT'S ESOPHAGUS WITHOUT DYSPLASIA: ICD-10-CM

## 2022-07-19 DIAGNOSIS — K21.9 GASTROESOPHAGEAL REFLUX DISEASE, UNSPECIFIED WHETHER ESOPHAGITIS PRESENT: ICD-10-CM

## 2022-07-19 PROBLEM — Z86.0101 HISTORY OF ADENOMATOUS POLYP OF COLON: Status: ACTIVE | Noted: 2022-07-19

## 2022-07-19 PROCEDURE — 99204 OFFICE O/P NEW MOD 45 MIN: CPT | Performed by: PHYSICIAN ASSISTANT

## 2022-07-19 RX ORDER — SODIUM, POTASSIUM,MAG SULFATES 17.5-3.13G
2 SOLUTION, RECONSTITUTED, ORAL ORAL SEE ADMIN INSTRUCTIONS
Qty: 354 ML | Refills: 0 | Status: SHIPPED | OUTPATIENT
Start: 2022-07-19 | End: 2022-10-06 | Stop reason: ALTCHOICE

## 2022-07-19 RX ORDER — PANTOPRAZOLE SODIUM 40 MG/1
40 TABLET, DELAYED RELEASE ORAL DAILY
Qty: 90 TABLET | Refills: 2 | Status: SHIPPED | OUTPATIENT
Start: 2022-07-19 | End: 2023-04-15

## 2022-07-19 NOTE — PROGRESS NOTES
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, her last colonoscopy was done in June 2018 seeing the removal of a benign sigmoid colon polyp at the site of previous dysplastic polypectomy  She also endorses bright red blood per rectum at this time which clinically appears most likely hemorrhoidal but certainly should exclude underlying adenomatous lesion    - will plan for colonoscopy at the same time as EGD     -procedures were explained in detail to the patient at this time including associated risks and benefits     -prescription and instructions provided for colonoscopy prep     -suggested patient to try to avoid straining with defecation, trial MiraLax as needed for constipation, also suggested daily fiber supplementation with regular fluid intake and physical activity      2  Nondysplastic Alvares's esophagus, 2 cm tongue last evaluated endoscopically in April 2018, patient also endorsing GERD symptomatology and globus sensation at this time and had been off of PPI therapy for a while until recently restarting PPI a couple of days ago  Rule out underlying esophageal malignancy or dysplasia    - plan for EGD at the same time as colonoscopy, again procedures were described to the patient at this time     -continue PPI therapy for the time being, I provided prescription for pantoprazole 40 mg once daily and instructed her in its use  She will most likely require long-term PPI therapy given her Alvares's    -advised patient regarding dietary lifestyle modification strategies for the mitigation of GERD  ______________________________________________________________________    HPI:  59-year-old female with history of concussion who presents for evaluation    She notes she wants to be up-to-date on her health screenings, she reports both of her parents have been diagnosed with lung cancer and her father recently passed away  She was seen by our service in 2018 for evaluation of epigastric discomfort and also for screening purposes, in April 2018 an EGD showed a 2 cm tongue of nondysplastic Alvares's confirmed on biopsy, wall the colonoscopy saw the removal of the sigmoid colon polyps found to represent tubulovillous adenoma with high-grade dysplasia; colonoscopy was repeated 2 months later seeing the removal of a benign sigmoid polyp from the same region, and she had been recommended for surveillance colonoscopy in 1-2 years after that which has not been done as of yet  The patient says that she has been noting blood streaked in otherwise brown stools recently, she also endorses some globus sensation with a pressure-like sensation in the throat and feels like this is acid reflux  She says she has been off of her Protonix for a long time but recently started taking this again about 2 days ago  She thinks her weight has been stable  She generally has a bowel movement about once every other day, this is usually formed, but she does have to push to defecate fairly often  REVIEW OF SYSTEMS:    CONSTITUTIONAL: Denies any fever, chills, rigors, and weight loss  HEENT: No earache or tinnitus  Denies hearing loss or visual disturbances  CARDIOVASCULAR: No chest pain or palpitations  RESPIRATORY: Denies any cough, hemoptysis, shortness of breath or dyspnea on exertion  GASTROINTESTINAL: As noted in the History of Present Illness  GENITOURINARY: No problems with urination  Denies any hematuria or dysuria  NEUROLOGIC: No dizziness or vertigo, denies headaches  MUSCULOSKELETAL: Denies any muscle or joint pain  SKIN: Denies skin rashes or itching  ENDOCRINE: Denies excessive thirst  Denies intolerance to heat or cold  PSYCHOSOCIAL: Denies depression or anxiety  Denies any recent memory loss  Historical Information   Past Medical History:   Diagnosis Date    Concussion with no loss of consciousness 10/02/2015    resolved 2017    GERD (gastroesophageal reflux disease)      Past Surgical History:   Procedure Laterality Date    NO PAST SURGERIES      [place] COLONOSCOPY FLX DX W/COLLJ SPEC WHEN PFRMD N/A 2018    Procedure: EGD AND COLONOSCOPY;  Surgeon: Torsten Cordero MD;  [facility]; Service: Gastroenterology    AL COLONOSCOPY FLX DX W/COLLJ SPEC WHEN PFRMD N/A 2018    Procedure: COLONOSCOPY;  Surgeon: Torsten Cordero MD;  [facility]; Service: Gastroenterology     Social History   Social History     Substance and Sexual Activity   Alcohol Use Yes    Comment: social     Social History     Substance and Sexual Activity   Drug Use No     Social History     Tobacco Use   Smoking Status Former Smoker    Quit date: 3/19/2014    Years since quittin 3   Smokeless Tobacco Never Used     Family History   Problem Relation Age of Onset    Lung cancer Mother     Hypertension Father     Lung cancer Father     No Known Problems Sister     No Known Problems Sister     No Known Problems Sister     No Known Problems Daughter     No Known Problems Brother     No Known Problems Brother     No Known Problems Son     No Known Problems Son     Lung cancer Maternal Aunt     Colon cancer Paternal Aunt        Meds/Allergies       Current Outpatient Medications:     diazepam (VALIUM) 5 mg tablet    lidocaine (LIDODERM) 5 %    No Known Allergies        Objective     Blood pressure 132/80, pulse 98, height 5' 4" (1 626 m), weight 100 kg (221 lb), SpO2 98 %, not currently breastfeeding  Body mass index is 37 93 kg/m²          PHYSICAL EXAM:      General Appearance:   Alert, cooperative, no distress   HEENT:   Normocephalic, atraumatic, anicteric      Neck:  Supple, symmetrical, trachea midline   Lungs:   Clear to auscultation bilaterally; no rales, rhonchi or wheezing; respirations unlabored    Heart[de-identified]   Regular rate and rhythm; no murmur, rub, or gallop  Abdomen:   Soft, non-tender, non-distended; normal bowel sounds; no masses, no organomegaly    Genitalia:   Deferred    Rectal:   Deferred    Extremities:  No cyanosis, clubbing or edema    Pulses:  2+ and symmetric    Skin:  No jaundice, rashes, or lesions    Lymph nodes:  No palpable cervical lymphadenopathy        Lab Results:   No visits with results within 1 Day(s) from this visit  Latest known visit with results is:   Admission on 01/05/2022, Discharged on 01/05/2022   Component Date Value    SARS-CoV-2 01/05/2022 Negative     INFLUENZA A PCR 01/05/2022 Negative     INFLUENZA B PCR 01/05/2022 Negative     RSV PCR 01/05/2022 Negative          Radiology Results:   No results found

## 2022-07-19 NOTE — PATIENT INSTRUCTIONS
Scheduled date of EGD/colonoscopy (as of today):10/6/22  Physician performing EGD/colonoscopy: Deepali  Location of EGD/colonoscopy: [facility]  Desired bowel prep reviewed with patient: Suprep  Instructions reviewed with patient by: Lizbeth Abrams  Clearances:   None

## 2022-09-07 ENCOUNTER — APPOINTMENT (OUTPATIENT)
Dept: LAB | Facility: HOSPITAL | Age: 52
End: 2022-09-07

## 2022-09-07 DIAGNOSIS — Z00.8 ENCOUNTER FOR OTHER GENERAL EXAMINATION: ICD-10-CM

## 2022-09-07 LAB
CHOLEST SERPL-MCNC: 208 MG/DL
EST. AVERAGE GLUCOSE BLD GHB EST-MCNC: 108 MG/DL
HBA1C MFR BLD: 5.4 %
HDLC SERPL-MCNC: 39 MG/DL
LDLC SERPL CALC-MCNC: 130 MG/DL (ref 0–100)
NONHDLC SERPL-MCNC: 169 MG/DL
TRIGL SERPL-MCNC: 195 MG/DL

## 2022-09-07 PROCEDURE — 36415 COLL VENOUS BLD VENIPUNCTURE: CPT

## 2022-09-07 PROCEDURE — 80061 LIPID PANEL: CPT

## 2022-09-07 PROCEDURE — 83036 HEMOGLOBIN GLYCOSYLATED A1C: CPT

## 2022-09-12 ENCOUNTER — OFFICE VISIT (OUTPATIENT)
Dept: FAMILY MEDICINE CLINIC | Facility: CLINIC | Age: 52
End: 2022-09-12
Payer: COMMERCIAL

## 2022-09-12 VITALS
WEIGHT: 227 LBS | SYSTOLIC BLOOD PRESSURE: 134 MMHG | HEART RATE: 68 BPM | DIASTOLIC BLOOD PRESSURE: 80 MMHG | BODY MASS INDEX: 38.76 KG/M2 | TEMPERATURE: 97.6 F | RESPIRATION RATE: 18 BRPM | OXYGEN SATURATION: 99 % | HEIGHT: 64 IN

## 2022-09-12 DIAGNOSIS — J06.9 ACUTE URI: Primary | ICD-10-CM

## 2022-09-12 PROCEDURE — 99213 OFFICE O/P EST LOW 20 MIN: CPT | Performed by: NURSE PRACTITIONER

## 2022-09-12 RX ORDER — AMOXICILLIN 875 MG/1
875 TABLET, COATED ORAL 2 TIMES DAILY
Qty: 20 TABLET | Refills: 0 | Status: SHIPPED | OUTPATIENT
Start: 2022-09-12 | End: 2022-09-22

## 2022-09-12 NOTE — PROGRESS NOTES
Assessment/Plan:    1  Acute URI  -     amoxicillin (AMOXIL) 875 mg tablet; Take 1 tablet (875 mg total) by mouth 2 (two) times a day for 10 days        Patient Instructions: Take medication with food  It is important that you take the entire course of antibiotics prescribed  May also take a probiotic of your choice to maintain healthy GI navi  Can take some probiotic and yogurt with the medication  Supportive care discussed and advised  Advised to RTO for any worsening and no improvement  Follow up for no improvement and worsening of conditions  Patient advised and educated when to see immediate medical care  Return if symptoms worsen or fail to improve  Future Appointments   Date Time Provider Chase Robb   10/6/2022  2:30 PM [facility]   10/19/2022  9:20 AM Vicie Bence, MD [facility]           Subjective:      Patient ID: Mateo Grigsby is a 46 y o  female  Chief Complaint   Patient presents with    Earache    Headache    Fatigue    Sore Throat     Started Friday  Covid Negative  [id number]         Vitals:  /80   Pulse 68   Temp 97 6 °F (36 4 °C)   Resp 18   Ht 5' 4" (1 626 m)   Wt 103 kg (227 lb)   LMP 04/04/2022 (Approximate)   SpO2 99%   BMI 38 96 kg/m²     HPI  Patient started with headache on 9/9/2022 and progressed to sore throat and left ear ache  Denies fever, chills and sob  Felt mild discomfort with swallowing and took motrin which helped  Took home covid-19 test which is negative           PHQ-2/9 Depression Screening    Little interest or pleasure in doing things: 0 - not at all  Feeling down, depressed, or hopeless: 0 - not at all  PHQ-2 Score: 0  PHQ-2 Interpretation: Negative depression screen             The following portions of the patient's history were reviewed and updated as appropriate: allergies, current medications, past family history, past medical history, past social history, past surgical history and problem list       Review of Systems   Constitutional: Positive for fatigue  Negative for chills, diaphoresis, fever and unexpected weight change  HENT: Positive for ear pain and sore throat  Negative for congestion, dental problem, drooling, ear discharge, facial swelling, hearing loss, mouth sores, nosebleeds, postnasal drip, rhinorrhea, sinus pressure, sinus pain, sneezing, tinnitus, trouble swallowing and voice change  Respiratory: Negative for cough, chest tightness, shortness of breath and wheezing  Cardiovascular: Negative  Gastrointestinal: Negative for abdominal pain, constipation, diarrhea, nausea and vomiting  Skin: Negative  Neurological: Positive for headaches  Negative for dizziness and light-headedness  Objective:    Social History     Tobacco Use   Smoking Status Former Smoker    Quit date: 3/19/2014    Years since quittin 4   Smokeless Tobacco Never Used       Allergies: No Known Allergies      Current Outpatient Medications   Medication Sig Dispense Refill    amoxicillin (AMOXIL) 875 mg tablet Take 1 tablet (875 mg total) by mouth 2 (two) times a day for 10 days 20 tablet 0    Na Sulfate-K Sulfate-Mg Sulf (Suprep Bowel Prep Kit) 17 5-3 13-1 6 GM/177ML SOLN Take 2 Bottles by mouth see administration instructions Suprep bowel prep  Please follow the instructions from the office 354 mL 0    pantoprazole (PROTONIX) 40 mg tablet Take 1 tablet (40 mg total) by mouth daily 90 tablet 2    diazepam (VALIUM) 5 mg tablet Take 0 5 tablets (2 5 mg total) by mouth every 8 (eight) hours as needed for muscle spasms for up to 10 days (Patient not taking: Reported on 2021) 10 tablet 0    lidocaine (LIDODERM) 5 % Apply 1 patch topically daily Remove & Discard patch within 12 hours or as directed by MD (Patient not taking: No sig reported) 15 patch 0     No current facility-administered medications for this visit  Physical Exam  Vitals reviewed     Constitutional: Appearance: Normal appearance  She is well-developed  HENT:      Head: Normocephalic  Right Ear: Tympanic membrane, ear canal and external ear normal       Left Ear: Tympanic membrane, ear canal and external ear normal       Nose: Nose normal       Right Sinus: No maxillary sinus tenderness or frontal sinus tenderness  Left Sinus: No maxillary sinus tenderness or frontal sinus tenderness  Mouth/Throat:      Mouth: No oral lesions  Pharynx: No oropharyngeal exudate or posterior oropharyngeal erythema  Cardiovascular:      Rate and Rhythm: Normal rate and regular rhythm  Heart sounds: Normal heart sounds  Pulmonary:      Effort: Pulmonary effort is normal       Breath sounds: Normal breath sounds  Musculoskeletal:         General: Normal range of motion  Cervical back: Neck supple  Lymphadenopathy:      Cervical:      Right cervical: No superficial or posterior cervical adenopathy  Left cervical: No superficial or posterior cervical adenopathy  Skin:     General: Skin is warm and dry  Neurological:      Mental Status: She is alert and oriented to person, place, and time  Psychiatric:         Behavior: Behavior normal          Thought Content:  Thought content normal          Judgment: Judgment normal                      FATIMAH Emmanuel

## 2022-09-12 NOTE — PATIENT INSTRUCTIONS
Amoxicillin (By mouth)   Amoxicillin (r-[id number]-b-PRINCESS-in)  Treats infections or stomach ulcers  This medicine is a penicillin antibiotic  Brand Name(s): Moxatag, Prevpac   There may be other brand names for this medicine  When This Medicine Should Not Be Used: This medicine is not right for everyone  You should not use it if you had an allergic reaction to amoxicillin, any type of penicillin, or a cephalosporin antibiotic  How to Use This Medicine:   Capsule, Liquid, Tablet, Chewable Tablet, Long Acting Tablet  Your doctor will tell you how much medicine to use  Do not use more than directed  Chewable tablet: You must chew the tablet before you swallow it  You may crush the tablet and mix the medicine with a small amount of food to make it easier to swallow  Oral liquid: Shake well just before each use  Measure the oral liquid medicine with a marked measuring spoon, oral syringe, or medicine cup  You may mix the oral liquid with a baby formula, milk, fruit juice, water, ginger ale, or another cold drink  Be sure your child drinks all of the mixture right away  Tablet for suspension: Place the tablet in a small drinking glass, and add 2 teaspoons of water  Do not use any other liquid  Gently stir or swirl the water in the glass until the tablet is completely dissolved  Drink all of this mixture right away  Add more water to the glass and drink all of it to make sure you get all of the medicine  Do not chew or swallow the tablet for suspension  Take all of the medicine in your prescription to clear up your infection, even if you feel better after the first few doses  Take a dose as soon as you remember  If it is almost time for your next dose, wait until then and take a regular dose  Do not take extra medicine to make up for a missed dose  Store the tablets, capsules, and tablets for suspension at room temperature, away from heat, moisture, and direct light  Store the oral liquid in the refrigerator   Do not freeze  Throw away any unused medicine after 14 days  Drugs and Foods to Avoid:   Ask your doctor or pharmacist before using any other medicine, including over-the-counter medicines, vitamins, and herbal products  Some medicines can affect how amoxicillin works  Tell your doctor if you are also using any of the following:   Allopurinol  Probenecid  Birth control pills  A blood thinner  Warnings While Using This Medicine:   Tell your doctor if you are pregnant or breastfeeding, or if you have kidney disease, allergies, or a condition called phenylketonuria (PKU)  Tell your doctor if you are on dialysis  This medicine can cause diarrhea  Call your doctor if the diarrhea becomes severe, does not stop, or is bloody  Do not take any medicine to stop diarrhea until you have talked to your doctor  Diarrhea can occur 2 months or more after you stop taking this medicine  Tell any doctor or dentist who treats you that you are using this medicine  This medicine may affect certain medical test results  Call your doctor if your symptoms do not improve or if they get worse  Use this medicine to treat only the infection your doctor has prescribed it for  Do not use this medicine for any infection or condition that has not been checked by a doctor  This medicine will not treat the flu or the common cold  Keep all medicine out of the reach of children  Never share your medicine with anyone  Possible Side Effects While Using This Medicine:   Call your doctor right away if you notice any of these side effects:   Allergic reaction: Itching or hives, swelling in your face or hands, swelling or tingling in your mouth or throat, chest tightness, trouble breathing  Blistering, peeling, or red skin rash  Diarrhea that may contain blood, stomach cramps, fever  If you notice these less serious side effects, talk with your doctor:   Mild diarrhea, nausea, or vomiting  Mild skin rash  If you notice other side effects that you think are caused by this medicine, tell your doctor  Call your doctor for medical advice about side effects  You may report side effects to FDA at 4-160-VXS-7641    © Copyright [employer] 2022 Information is for End User's use only and may not be sold, redistributed or otherwise used for commercial purposes  The above information is an  only  It is not intended as medical advice for individual conditions or treatments  Talk to your doctor, nurse or pharmacist before following any medical regimen to see if it is safe and effective for you

## 2022-09-22 ENCOUNTER — ANESTHESIA (OUTPATIENT)
Dept: ANESTHESIOLOGY | Facility: HOSPITAL | Age: 52
End: 2022-09-22

## 2022-09-22 ENCOUNTER — ANESTHESIA EVENT (OUTPATIENT)
Dept: ANESTHESIOLOGY | Facility: HOSPITAL | Age: 52
End: 2022-09-22

## 2022-10-06 ENCOUNTER — ANESTHESIA EVENT (OUTPATIENT)
Dept: GASTROENTEROLOGY | Facility: AMBULARY SURGERY CENTER | Age: 52
End: 2022-10-06

## 2022-10-06 ENCOUNTER — ANESTHESIA (OUTPATIENT)
Dept: GASTROENTEROLOGY | Facility: AMBULARY SURGERY CENTER | Age: 52
End: 2022-10-06

## 2022-10-06 ENCOUNTER — HOSPITAL ENCOUNTER (OUTPATIENT)
Dept: GASTROENTEROLOGY | Facility: AMBULARY SURGERY CENTER | Age: 52
Setting detail: OUTPATIENT SURGERY
End: 2022-10-06
Payer: COMMERCIAL

## 2022-10-06 VITALS
RESPIRATION RATE: 18 BRPM | WEIGHT: 222 LBS | DIASTOLIC BLOOD PRESSURE: 57 MMHG | HEIGHT: 65 IN | HEART RATE: 76 BPM | SYSTOLIC BLOOD PRESSURE: 126 MMHG | TEMPERATURE: 97.5 F | BODY MASS INDEX: 36.99 KG/M2 | OXYGEN SATURATION: 96 %

## 2022-10-06 DIAGNOSIS — K22.70 BARRETT'S ESOPHAGUS WITHOUT DYSPLASIA: ICD-10-CM

## 2022-10-06 DIAGNOSIS — Z86.010 HISTORY OF ADENOMATOUS POLYP OF COLON: ICD-10-CM

## 2022-10-06 DIAGNOSIS — K21.9 GASTROESOPHAGEAL REFLUX DISEASE, UNSPECIFIED WHETHER ESOPHAGITIS PRESENT: ICD-10-CM

## 2022-10-06 PROBLEM — E66.9 OBESITY: Status: ACTIVE | Noted: 2022-10-06

## 2022-10-06 LAB
EXT PREGNANCY TEST URINE: NEGATIVE
EXT. CONTROL: NORMAL

## 2022-10-06 PROCEDURE — 45385 COLONOSCOPY W/LESION REMOVAL: CPT | Performed by: INTERNAL MEDICINE

## 2022-10-06 PROCEDURE — 81025 URINE PREGNANCY TEST: CPT | Performed by: ANESTHESIOLOGY

## 2022-10-06 PROCEDURE — 43239 EGD BIOPSY SINGLE/MULTIPLE: CPT | Performed by: INTERNAL MEDICINE

## 2022-10-06 PROCEDURE — 88305 TISSUE EXAM BY PATHOLOGIST: CPT | Performed by: PATHOLOGY

## 2022-10-06 RX ORDER — ONDANSETRON 2 MG/ML
4 INJECTION INTRAMUSCULAR; INTRAVENOUS ONCE AS NEEDED
Status: CANCELLED | OUTPATIENT
Start: 2022-10-06

## 2022-10-06 RX ORDER — PROPOFOL 10 MG/ML
INJECTION, EMULSION INTRAVENOUS AS NEEDED
Status: DISCONTINUED | OUTPATIENT
Start: 2022-10-06 | End: 2022-10-06

## 2022-10-06 RX ORDER — SODIUM CHLORIDE, SODIUM LACTATE, POTASSIUM CHLORIDE, CALCIUM CHLORIDE 600; 310; 30; 20 MG/100ML; MG/100ML; MG/100ML; MG/100ML
20 INJECTION, SOLUTION INTRAVENOUS CONTINUOUS
Status: DISCONTINUED | OUTPATIENT
Start: 2022-10-06 | End: 2022-10-10 | Stop reason: HOSPADM

## 2022-10-06 RX ORDER — PROPOFOL 10 MG/ML
INJECTION, EMULSION INTRAVENOUS CONTINUOUS PRN
Status: DISCONTINUED | OUTPATIENT
Start: 2022-10-06 | End: 2022-10-06

## 2022-10-06 RX ORDER — SODIUM CHLORIDE, SODIUM LACTATE, POTASSIUM CHLORIDE, CALCIUM CHLORIDE 600; 310; 30; 20 MG/100ML; MG/100ML; MG/100ML; MG/100ML
125 INJECTION, SOLUTION INTRAVENOUS CONTINUOUS
Status: DISCONTINUED | OUTPATIENT
Start: 2022-10-06 | End: 2022-10-10 | Stop reason: HOSPADM

## 2022-10-06 RX ORDER — LIDOCAINE HYDROCHLORIDE 20 MG/ML
INJECTION, SOLUTION EPIDURAL; INFILTRATION; INTRACAUDAL; PERINEURAL AS NEEDED
Status: DISCONTINUED | OUTPATIENT
Start: 2022-10-06 | End: 2022-10-06

## 2022-10-06 RX ADMIN — SODIUM CHLORIDE, SODIUM LACTATE, POTASSIUM CHLORIDE, AND CALCIUM CHLORIDE: .6; .31; .03; .02 INJECTION, SOLUTION INTRAVENOUS at 08:56

## 2022-10-06 RX ADMIN — LIDOCAINE HYDROCHLORIDE 100 MG: 20 INJECTION, SOLUTION EPIDURAL; INFILTRATION; INTRACAUDAL at 09:12

## 2022-10-06 RX ADMIN — PROPOFOL 150 MCG/KG/MIN: 10 INJECTION, EMULSION INTRAVENOUS at 09:12

## 2022-10-06 RX ADMIN — PROPOFOL 100 MG: 10 INJECTION, EMULSION INTRAVENOUS at 09:12

## 2022-10-06 NOTE — H&P
History and Physical - [facility]  Bernard Macario 46 y o  female MRN: [id number]    HPI: Bernard Macario is a 46y o  year old female who presents for colon cancer screening, has history of tubulovillous adenoma with high-grade dysplasia and has history of nondysplastic Alvares's esophagus  Review of Systems    Historical Information   Past Medical History:   Diagnosis Date    Colon polyp     Concussion with no loss of consciousness 10/02/2015    resolved 2017    GERD (gastroesophageal reflux disease)      Past Surgical History:   Procedure Laterality Date    COLONOSCOPY      NO PAST SURGERIES      [place] COLONOSCOPY FLX DX W/COLLJ SPEC WHEN PFRMD N/A 2018    Procedure: EGD AND COLONOSCOPY;  Surgeon: Melany Villeda MD;  [facility]; Service: Gastroenterology    [place] COLONOSCOPY FLX DX W/COLLJ SPEC WHEN PFRMD N/A 2018    Procedure: COLONOSCOPY;  Surgeon: Melany Villeda MD;  [facility];   Service: Gastroenterology    UPPER GASTROINTESTINAL ENDOSCOPY       Social History   Social History     Substance and Sexual Activity   Alcohol Use Yes    Comment: social     Social History     Substance and Sexual Activity   Drug Use No     Social History     Tobacco Use   Smoking Status Former Smoker    Quit date: 3/19/2014    Years since quittin 5   Smokeless Tobacco Never Used     Family History   Problem Relation Age of Onset    Lung cancer Mother     Hypertension Father     Lung cancer Father     No Known Problems Sister     No Known Problems Sister     No Known Problems Sister     No Known Problems Daughter     No Known Problems Brother     No Known Problems Brother     No Known Problems Son     No Known Problems Son     Lung cancer Maternal Aunt     Colon cancer Paternal Aunt        Meds/Allergies     (Not in a hospital admission)      No Known Allergies    Objective     /84   Pulse 83   Temp 97 7 °F (36 5 °C) (Temporal) Resp 19   Ht 5' 5" (1 651 m)   Wt 101 kg (222 lb)   LMP 04/04/2022 (Approximate)   SpO2 97%   BMI 36 94 kg/m²       PHYSICAL EXAM    Gen: NAD  CV: RRR  CHEST: Clear  ABD: soft, NT/ND  EXT: no edema  Neuro: AAO      ASSESSMENT/PLAN:  This is a 46y o  year old female here for surveillance of Barretts esophagus and colon polyps  Patient was explained about  the risks and benefits of the procedure  Risks including but not limited to bleeding, infection, perforation were explained in detail  Also explained about less than 100% sensitivity with the exam and other alternatives  PLAN:   Procedure:  EGD and colonoscopy

## 2022-10-06 NOTE — ANESTHESIA PREPROCEDURE EVALUATION
Procedure:  COLONOSCOPY  EGD    Relevant Problems   GI/HEPATIC   (+) Fatty liver   (+) Gastroesophageal reflux disease   (+) Hiatal hernia      Digestive   (+) Alvares's esophagus without dysplasia      Other   (+) Dyspepsia   (+) Obesity        Physical Exam    Airway    Mallampati score: III  TM Distance: >3 FB  Neck ROM: full     Dental       Cardiovascular      Pulmonary      Other Findings        Anesthesia Plan  ASA Score- 2     Anesthesia Type- IV sedation with anesthesia with ASA Monitors  Additional Monitors:   Airway Plan:           Plan Factors-    Chart reviewed  Existing labs reviewed  Patient summary reviewed  Induction- intravenous  Postoperative Plan-     Informed Consent- Anesthetic plan and risks discussed with patient  I personally reviewed this patient with the CRNA  Discussed and agreed on the Anesthesia Plan with the CRNA  Beau Gordon

## 2022-10-06 NOTE — ANESTHESIA POSTPROCEDURE EVALUATION
Post-Op Assessment Note    CV Status:  Stable  Pain Score: 0    Pain management: adequate     Mental Status:  Sleepy   Hydration Status:  Euvolemic   PONV Controlled:  Controlled   Airway Patency:  Patent      Post Op Vitals Reviewed: Yes      Staff: CRNA         No complications documented      BP   165/76   Temp 97 5   Pulse 83   Resp 16   SpO2 96%

## 2022-10-14 PROCEDURE — 88305 TISSUE EXAM BY PATHOLOGIST: CPT | Performed by: PATHOLOGY

## 2022-10-19 ENCOUNTER — TELEPHONE (OUTPATIENT)
Dept: DERMATOLOGY | Facility: CLINIC | Age: 52
End: 2022-10-19

## 2022-10-19 NOTE — TELEPHONE ENCOUNTER
Pt didn't make it to her 920a appt today  Called pt but no answer and LVM advising pt to call us back if she needed to reschedule

## 2022-10-20 ENCOUNTER — TELEPHONE (OUTPATIENT)
Dept: GASTROENTEROLOGY | Facility: CLINIC | Age: 52
End: 2022-10-20

## 2022-10-20 NOTE — TELEPHONE ENCOUNTER
Patients GI provider:  Dr Aravind Manzano    Number to return call: [phone]     Reason for call: Pt returned missed call to go over tissue exam results please reach out to patient thank you     Scheduled procedure/appointment date if applicable: n/a

## 2022-12-03 ENCOUNTER — AMB VIDEO VISIT (OUTPATIENT)
Dept: OTHER | Facility: HOSPITAL | Age: 52
End: 2022-12-03

## 2022-12-03 DIAGNOSIS — U07.1 COVID-19: Primary | ICD-10-CM

## 2022-12-03 NOTE — CARE ANYWHERE EVISITS
Visit Summary for Romana Rodriguez - Gender: Female - Date of Birth: [id number]  Date: [id number] - Duration: 9 minutes  Patient: Romana Rodriguez  Provider: Mavis Guerra PA-C    Patient Contact Information  Address  [address]  [facility] [address]  [phone]    Visit Topics  Tested positive for Covid 7 days ago  Feel worse each day  Consistent nonproductive cough, lethargy, and stuffiness  I understand the virus has to run its course but I am wondering if anything can be done  I am taking Sudafed and mucinex every 4 hours   without any results  [Added By: Self - 2022-12-03]    Triage Questions   What is your current physical address in the event of a medical emergency? Answer []  Are you allergic to any medications? Answer []  Are you now or could you be pregnant? Answer []  Do you have any immune system compromise or chronic lung   disease? Answer []  Do you have any vulnerable family members in the home (infant, pregnant, cancer, elderly)? Answer []     Conversation Transcripts  [0A][0A] [Notification] You are connected with Mavis Guerra PA-C, Urgent Care Specialist [0A][Notification] Caryn Riedel is located in [place]  [0A][Notification] Caryn Riedel has shared health history  Sultana Linoil  [0A]    Diagnosis  COVID-19    Procedures  Value: [id number] Code: CPT-4 UNLISTED E&M SERVICE    Medications Prescribed    No prescriptions ordered    Electronically signed by: Maren Espana(NPI [id number])

## 2022-12-03 NOTE — PROGRESS NOTES
Video Visit - Tita Reeder 46 y o  female MRN: [id number]    REQUIRED DOCUMENTATION:         1  This service was provided via [facility]  2  Provider located at [address] [place] [id number] [phone]  3  [facility] provider: June Irvin PA-C   4  Identify all parties in room with patient during [facility] visit:  No one else  5  After connecting through [facility], patient was identified by name and date of birth  Patient was then informed that this was a Telemedicine visit and that the exam was being conducted confidentially over secure lines  My office door was closed  No one else was in the room  Patient acknowledged consent and understanding of privacy and security of the Telemedicine visit  I informed the patient that I have reviewed their record in Epic and presented the opportunity for them to ask any questions regarding the visit today  The patient agreed to participate  HPI  Patient states she tested positive for covid 7 days ago  She feels like she is getting worse every day  She is taking pseudofed and mucinex without relief  She has a pain in her chest  Her symptoms started 8 days ago  She was not on paxlovid  She having stuffy nose, tight chest, non productive cough, can't smell or taste, bodyaches  She feels the sinus congestion and chest congestion is getting worse  3 days ago she thought she was feeling better than got worse again  She states she can get up and take a shower but then can't do much the rest of the day  She is SOB with walking or activity  She is vaccinated and second boostered for covid  Pulse ox 88  It goes to 91 with deep breath  It is 89-90 with ambulation  Physical Exam  Constitutional:       General: She is not in acute distress  Appearance: Normal appearance  She is not ill-appearing, toxic-appearing or diaphoretic  HENT:      Head: Normocephalic and atraumatic  Nose: Congestion present     Pulmonary: Effort: Pulmonary effort is normal  No respiratory distress  Comments: Talking in complete sentences, no audible wheezing  Able to ambulate across room   Skin:     General: Skin is dry  Neurological:      General: No focal deficit present  Mental Status: She is alert and oriented to person, place, and time  Psychiatric:         Mood and Affect: Mood normal          Behavior: Behavior normal          Diagnoses and all orders for this visit:    COVID-19  -     Transfer to other facility    due to pulse ox 88-91 and patient stating she is worsening, recommend she go to ER for further evaluation  Pt agreeable to go to [employer] where she works  Patient Instructions   Go to ER

## 2023-02-07 ENCOUNTER — OFFICE VISIT (OUTPATIENT)
Dept: GASTROENTEROLOGY | Facility: AMBULARY SURGERY CENTER | Age: 53
End: 2023-02-07

## 2023-02-07 VITALS
SYSTOLIC BLOOD PRESSURE: 128 MMHG | HEART RATE: 67 BPM | DIASTOLIC BLOOD PRESSURE: 80 MMHG | BODY MASS INDEX: 37.85 KG/M2 | OXYGEN SATURATION: 97 % | HEIGHT: 65 IN | WEIGHT: 227.2 LBS

## 2023-02-07 DIAGNOSIS — K22.70 BARRETT'S ESOPHAGUS WITHOUT DYSPLASIA: ICD-10-CM

## 2023-02-07 DIAGNOSIS — R10.13 DYSPEPSIA: Primary | ICD-10-CM

## 2023-02-07 DIAGNOSIS — R10.10 UPPER ABDOMINAL PAIN: ICD-10-CM

## 2023-02-07 DIAGNOSIS — K21.9 GASTROESOPHAGEAL REFLUX DISEASE, UNSPECIFIED WHETHER ESOPHAGITIS PRESENT: ICD-10-CM

## 2023-02-07 RX ORDER — PANTOPRAZOLE SODIUM 40 MG/1
40 TABLET, DELAYED RELEASE ORAL 2 TIMES DAILY
Qty: 180 TABLET | Refills: 1 | Status: SHIPPED | OUTPATIENT
Start: 2023-02-07 | End: 2023-08-06

## 2023-02-07 NOTE — ASSESSMENT & PLAN NOTE
Characterized by relatively constant left upper quadrant discomfort radiating to the epigastric region at times, with no obvious postprandial component but with fairly consistent nausea, as well as bloating and belching dyspeptic symptomatology  No evidence of peptic ulcer disease or H  pylori infection at the time of recent EGD    She was noted with gallbladder stones on ultrasound from about 5 years ago, also consider gastroparesis      -We will check right upper quadrant ultrasound    -Check gastric emptying study    -In case of development of interim gastritis/worsened GERD, will increase PPI to twice daily until follow-up in a few months    -We will also check celiac disease antibody profile    -She will be due for EGD for Alvares's surveillance in 2025, and colonoscopy for history of colon polyps in 2027

## 2023-02-07 NOTE — PROGRESS NOTES
Follow-up Note -  [facility]  Oliver Ryan 1970 46 y o  female         Reason: Follow-up; epigastric/left upper abdominal pain, bloating    HPI: 66-year-old female with history of GERD, BMI 37 8 who presents for follow-up; she was seen by myself in our office in September following up on history of colonic tubulovillous adenoma, Alvares's esophagus, was also reporting some globus sensation, GERD and intermittent bright red blood per rectum at that time  She underwent EGD and colonoscopy in October 2022 showing irregular Z-line biopsy negative for Alvares's, EGD in 3 years was recommended, she also had 1 small hyperplastic polyp removed from the colon and was recommended for colonoscopy in 5 years  At this time, the patient says that over the last approximately 3 weeks she has been noticing constant discomfort in her left upper quadrant which sometimes migrates to the epigastric region, it waxes and wanes but with no obvious alleviating or aggravating factors  It does not radiate to the back, she denies development of any rash in this region and she says she was vaccinated for shingles  She does have history of slipped disc at the level of L5-L6, denies any known history of back problems in the thoracic spine  She denies any vomiting or dysphagia, she appears to have gained about 6 pounds since her last office visit with us, so denies any unexplained weight loss, denies any NSAID use  She is currently moving her bowels fairly regularly, she says she is taking Protonix 40 mg once daily, she tried taking Tums for the left upper quadrant discomfort but does not seem to have been helping  She does notice some more belching and burping lately  Denies any noticeable changes in her diet over the last couple of months, or any changes in her medication regimen  She is not diabetic to her knowledge    Report from duodenal biopsies at the time of her recent EGD is apparently unavailable  REVIEW OF SYSTEMS:      CONSTITUTIONAL: Denies any fever, chills, or rigors  Good appetite, and no recent weight loss  HEENT: No earache or tinnitus  Denies hearing loss or visual disturbances  CARDIOVASCULAR: No chest pain or palpitations  RESPIRATORY: Denies any cough, hemoptysis, shortness of breath or dyspnea on exertion  GASTROINTESTINAL: As noted in the History of Present Illness  GENITOURINARY: No problems with urination  Denies any hematuria or dysuria  NEUROLOGIC: No dizziness or vertigo, denies headaches  MUSCULOSKELETAL: Denies any muscle or joint pain  SKIN: Denies skin rashes or itching  ENDOCRINE: Denies excessive thirst  Denies intolerance to heat or cold  PSYCHOSOCIAL: Denies depression or anxiety  Denies any recent memory loss  Past Medical History:   Diagnosis Date   • Colon polyp    • Concussion with no loss of consciousness 10/02/2015    resolved 2017   • GERD (gastroesophageal reflux disease)       Past Surgical History:   Procedure Laterality Date   • COLONOSCOPY     • NO PAST SURGERIES     • [place] COLONOSCOPY FLX DX W/COLLJ SPEC WHEN PFRMD N/A 2018    Procedure: EGD AND COLONOSCOPY;  Surgeon: Vita Carrillo MD;  [facility]; Service: Gastroenterology   • UT COLONOSCOPY FLX DX W/COLLJ SPEC WHEN PFRMD N/A 2018    Procedure: COLONOSCOPY;  Surgeon: Vita Carrillo MD;  [facility];   Service: Gastroenterology   • UPPER GASTROINTESTINAL ENDOSCOPY       Social History     Socioeconomic History   • Marital status: /Civil Union     Spouse name: Not on file   • Number of children: Not on file   • Years of education: Not on file   • Highest education level: Not on file   Occupational History   • Not on file   Tobacco Use   • Smoking status: Former     Types: Cigarettes     Quit date: 3/19/2014     Years since quittin 8   • Smokeless tobacco: Never   Vaping Use   • Vaping Use: Never used   Substance and Sexual Activity   • Alcohol use: Yes     Comment: social   • Drug use: No   • Sexual activity: Yes     Partners: Male   Other Topics Concern   • Not on file   Social History Narrative   • Not on file     Social Determinants of Health     Financial Resource Strain: Not on file   Food Insecurity: Not on file   Transportation Needs: Not on file   Physical Activity: Not on file   Stress: Not on file   Social Connections: Not on file   Intimate Partner Violence: Not on file   Housing Stability: Not on file     Family History   Problem Relation Age of Onset   • Lung cancer Mother    • Hypertension Father    • Lung cancer Father    • No Known Problems Sister    • No Known Problems Sister    • No Known Problems Sister    • No Known Problems Daughter    • No Known Problems Brother    • No Known Problems Brother    • No Known Problems Son    • No Known Problems Son    • Lung cancer Maternal Aunt    • Colon cancer Paternal Aunt      Patient has no known allergies  Current Outpatient Medications   Medication Sig Dispense Refill   • pantoprazole (PROTONIX) 40 mg tablet Take 1 tablet (40 mg total) by mouth 2 (two) times a day 180 tablet 1   • diazepam (VALIUM) 5 mg tablet Take 0 5 tablets (2 5 mg total) by mouth every 8 (eight) hours as needed for muscle spasms for up to 10 days (Patient not taking: Reported on 2/8/2021) 10 tablet 0   • lidocaine (LIDODERM) 5 % Apply 1 patch topically daily Remove & Discard patch within 12 hours or as directed by MD (Patient not taking: No sig reported) 15 patch 0     No current facility-administered medications for this visit  Blood pressure 128/80, pulse 67, height 5' 5" (1 651 m), weight 103 kg (227 lb 3 2 oz), last menstrual period 04/04/2022, SpO2 97 %, not currently breastfeeding      PHYSICAL EXAM:      General Appearance:   Alert, cooperative, no distress, appears stated age    HEENT:   Normocephalic, atraumatic, anicteric      Neck:  Supple, symmetrical, trachea midline, no adenopathy;    thyroid: no enlargement/tenderness/nodules; no carotid  bruit or JVD    Lungs:   Clear to auscultation bilaterally; no rales, rhonchi or wheezing; respirations unlabored    Heart[de-identified]   S1 and S2 normal; regular rate and rhythm; no murmur, rub, or gallop  Abdomen:   Soft, non-tender, non-distended; normal bowel sounds; no masses, no organomegaly    Extremities: No edema, erythema, wounds, rashes   Rectal:   Deferred                      Lab Results   Component Value Date    WBC 8 43 08/18/2021    HGB 13 6 08/18/2021    HCT 43 7 08/18/2021    MCV 83 08/18/2021     08/18/2021     Lab Results   Component Value Date    CALCIUM 9 7 08/18/2021    K 4 5 08/18/2021    CO2 25 08/18/2021     08/18/2021    BUN 16 08/18/2021    CREATININE 0 72 08/18/2021     Lab Results   Component Value Date    ALT 52 08/18/2021    AST 24 08/18/2021    ALKPHOS 84 08/18/2021     Lab Results   Component Value Date    INR 0 96 12/03/2020    PROTIME 12 7 12/03/2020       EGD    Result Date: 10/6/2022  Impression: 1  Irregular squamocolumnar junction, has history of Alvares's esophagus, biopsies obtained  RECOMMENDATION: There is no recommended follow-up for this procedure  Follow-up biopsy results in 2-3 weeks  Continue PPI indefinitely  Avoid NSAIDs  Dejon Barber MD     Colonoscopy    Result Date: 10/6/2022  Impression: 1  Diminutive polyp  2  Mild pandiverticulosis  3  Small internal hemorrhoids  RECOMMENDATION: Repeat colonoscopy in 5 years due to a personal history of colon polyps  Follow-up biopsy results in 2-3 weeks  High-fiber diet with 25-30 g of fiber on daily basis  Dejon Barber MD       ASSESSMENT & PLAN:    Dyspepsia  Characterized by relatively constant left upper quadrant discomfort radiating to the epigastric region at times, with no obvious postprandial component but with fairly consistent nausea, as well as bloating and belching dyspeptic symptomatology      No evidence of peptic ulcer disease or H  pylori infection at the time of recent EGD    She was noted with gallbladder stones on ultrasound from about 5 years ago, also consider gastroparesis      -We will check right upper quadrant ultrasound    -Check gastric emptying study    -In case of development of interim gastritis/worsened GERD, will increase PPI to twice daily until follow-up in a few months    -We will also check celiac disease antibody profile    -She will be due for EGD for Alvares's surveillance in 2025, and colonoscopy for history of colon polyps in 2027 not examined

## 2023-02-11 ENCOUNTER — HOSPITAL ENCOUNTER (OUTPATIENT)
Dept: RADIOLOGY | Facility: HOSPITAL | Age: 53
Discharge: HOME | End: 2023-02-11

## 2023-02-11 DIAGNOSIS — R10.13 DYSPEPSIA: ICD-10-CM

## 2023-02-11 DIAGNOSIS — R10.10 UPPER ABDOMINAL PAIN: ICD-10-CM

## 2023-02-27 ENCOUNTER — TELEPHONE (OUTPATIENT)
Dept: GASTROENTEROLOGY | Facility: AMBULARY SURGERY CENTER | Age: 53
End: 2023-02-27

## 2023-02-27 NOTE — TELEPHONE ENCOUNTER
LM advising pt to return my call to let me know if she has seen and reviewed her my chart message regarding ultrasound results or not  Advised to call either way  Warm

## 2023-08-23 ENCOUNTER — OFFICE VISIT (OUTPATIENT)
Dept: FAMILY MEDICINE CLINIC | Facility: CLINIC | Age: 53
End: 2023-08-23
Payer: COMMERCIAL

## 2023-08-23 VITALS
DIASTOLIC BLOOD PRESSURE: 62 MMHG | BODY MASS INDEX: 38.77 KG/M2 | RESPIRATION RATE: 18 BRPM | WEIGHT: 233 LBS | TEMPERATURE: 97.9 F | HEART RATE: 91 BPM | SYSTOLIC BLOOD PRESSURE: 120 MMHG

## 2023-08-23 DIAGNOSIS — Z11.4 SCREENING FOR HIV (HUMAN IMMUNODEFICIENCY VIRUS): ICD-10-CM

## 2023-08-23 DIAGNOSIS — K76.0 FATTY LIVER: ICD-10-CM

## 2023-08-23 DIAGNOSIS — K21.9 GASTROESOPHAGEAL REFLUX DISEASE, UNSPECIFIED WHETHER ESOPHAGITIS PRESENT: Primary | ICD-10-CM

## 2023-08-23 DIAGNOSIS — Z13.83 SCREENING FOR CARDIOVASCULAR, RESPIRATORY, AND GENITOURINARY DISEASES: ICD-10-CM

## 2023-08-23 DIAGNOSIS — Z13.29 SCREENING FOR THYROID DISORDER: ICD-10-CM

## 2023-08-23 DIAGNOSIS — Z80.1 FAMILY HISTORY OF LUNG CANCER: ICD-10-CM

## 2023-08-23 DIAGNOSIS — Z13.6 SCREENING FOR CARDIOVASCULAR, RESPIRATORY, AND GENITOURINARY DISEASES: ICD-10-CM

## 2023-08-23 DIAGNOSIS — Z13.1 SCREENING FOR DIABETES MELLITUS: ICD-10-CM

## 2023-08-23 DIAGNOSIS — E66.09 CLASS 2 OBESITY DUE TO EXCESS CALORIES WITH BODY MASS INDEX (BMI) OF 38.0 TO 38.9 IN ADULT, UNSPECIFIED WHETHER SERIOUS COMORBIDITY PRESENT: ICD-10-CM

## 2023-08-23 DIAGNOSIS — Z12.31 ENCOUNTER FOR SCREENING MAMMOGRAM FOR MALIGNANT NEOPLASM OF BREAST: ICD-10-CM

## 2023-08-23 DIAGNOSIS — Z11.59 NEED FOR HEPATITIS C SCREENING TEST: ICD-10-CM

## 2023-08-23 DIAGNOSIS — Z13.89 SCREENING FOR CARDIOVASCULAR, RESPIRATORY, AND GENITOURINARY DISEASES: ICD-10-CM

## 2023-08-23 PROCEDURE — 99214 OFFICE O/P EST MOD 30 MIN: CPT | Performed by: FAMILY MEDICINE

## 2023-08-23 NOTE — PROGRESS NOTES
Name: Vitaly العراقي      : 1970      MRN: [id number]  Encounter Provider: Shonda Parker MD  Encounter Date: 2023   Encounter department: [place]     1. Gastroesophageal reflux disease, unspecified whether esophagitis present  Assessment & Plan:  Stable on protonix. Follows GI. Had endoscopy last year. 2. Need for hepatitis C screening test  -     Hepatitis C Antibody; Future    3. Screening for HIV (human immunodeficiency virus)  -     HIV 1/2 AG/AB w Reflex [facility] for 2 yr old and above; Future    4. Screening for diabetes mellitus  -     HEMOGLOBIN A1C W/ EAG ESTIMATION; Future    5. Screening for cardiovascular, respiratory, and genitourinary diseases  -     CBC and differential; Future  -     Comprehensive metabolic panel; Future  -     Lipid Panel with Direct LDL reflex; Future    6. Screening for thyroid disorder  -     TSH, 3rd generation with Free T4 reflex; Future    7. Family history of lung cancer  -     CT chest wo contrast; Future; Expected date: 2023    8. Encounter for screening mammogram for malignant neoplasm of breast  -     Mammo screening bilateral w 3d & cad; Future; Expected date: 2023    9. Fatty liver  Assessment & Plan:  Counseled on diet/exercise. 10. Class 2 obesity due to excess calories with body mass index (BMI) of 38.0 to 38.9 in adult, unspecified whether serious comorbidity present  Assessment & Plan:  Counseled on lifestyle changes. Subjective      HPI   She is here today for routine follow up visit. She will be seeing Dr. Dominick Saleem in the next week for her physical, however she needed orders for [facility]. She is worried about lung cancer. Her mother, father and maternal aunt have lung cancer. Was advised for family members to screen for lung cancer. Her sister gets yearly screening CT scans. Leslie Melendez smoked for >20 years , but quit smoking 20 years ago.  She reports no signs/symptoms of lung cancer at this time. She does have a history of acid reflux controlled on PPI. No other acute issues/concerns. Review of Systems   Constitutional: Negative. HENT: Negative. Eyes: Negative. Respiratory: Negative. Cardiovascular: Negative. Gastrointestinal: Negative. Endocrine: Negative. Genitourinary: Negative. Musculoskeletal: Negative. Skin: Negative. Allergic/Immunologic: Negative. Neurological: Negative. Hematological: Negative. Psychiatric/Behavioral: Negative. Current Outpatient Medications on File Prior to Visit   Medication Sig   • pantoprazole (PROTONIX) 40 mg tablet Take 1 tablet (40 mg total) by mouth 2 (two) times a day   • [DISCONTINUED] diazepam (VALIUM) 5 mg tablet Take 0.5 tablets (2.5 mg total) by mouth every 8 (eight) hours as needed for muscle spasms for up to 10 days (Patient not taking: Reported on 2/8/2021)   • [DISCONTINUED] lidocaine (LIDODERM) 5 % Apply 1 patch topically daily Remove & Discard patch within 12 hours or as directed by MD (Patient not taking: Reported on 12/29/2020)       Objective     /62   Pulse 91   Temp 97.9 °F (36.6 °C)   Resp 18   Wt 106 kg (233 lb)   LMP 04/04/2022 (Approximate)   BMI 38.77 kg/m²     Physical Exam  Constitutional:       General: She is not in acute distress. Appearance: She is well-developed. She is not diaphoretic. HENT:      Head: Normocephalic and atraumatic. Cardiovascular:      Rate and Rhythm: Normal rate and regular rhythm. Heart sounds: Normal heart sounds. No murmur heard. No friction rub. No gallop. Pulmonary:      Effort: Pulmonary effort is normal. No respiratory distress. Breath sounds: Normal breath sounds. No wheezing or rales. Chest:      Chest wall: No tenderness. Musculoskeletal:         General: No deformity. Normal range of motion. Cervical back: Normal range of motion and neck supple. Skin:     General: Skin is warm and dry. Neurological:      Mental Status: She is alert and oriented to person, place, and time. Psychiatric:         Behavior: Behavior normal.         Thought Content:  Thought content normal.         Judgment: Judgment normal.       Nabor Peralta MD

## 2023-09-06 ENCOUNTER — APPOINTMENT (OUTPATIENT)
Dept: LAB | Facility: HOSPITAL | Age: 53
End: 2023-09-06
Attending: FAMILY MEDICINE
Payer: COMMERCIAL

## 2023-09-06 ENCOUNTER — APPOINTMENT (OUTPATIENT)
Dept: LAB | Facility: HOSPITAL | Age: 53
End: 2023-09-06
Payer: COMMERCIAL

## 2023-09-06 DIAGNOSIS — Z13.1 SCREENING FOR DIABETES MELLITUS: ICD-10-CM

## 2023-09-06 DIAGNOSIS — Z13.6 SCREENING FOR CARDIOVASCULAR, RESPIRATORY, AND GENITOURINARY DISEASES: ICD-10-CM

## 2023-09-06 DIAGNOSIS — Z11.4 SCREENING FOR HIV (HUMAN IMMUNODEFICIENCY VIRUS): ICD-10-CM

## 2023-09-06 DIAGNOSIS — Z11.59 NEED FOR HEPATITIS C SCREENING TEST: ICD-10-CM

## 2023-09-06 DIAGNOSIS — Z00.8 HEALTH EXAMINATION IN POPULATION SURVEY: ICD-10-CM

## 2023-09-06 DIAGNOSIS — Z13.89 SCREENING FOR CARDIOVASCULAR, RESPIRATORY, AND GENITOURINARY DISEASES: ICD-10-CM

## 2023-09-06 DIAGNOSIS — Z13.83 SCREENING FOR CARDIOVASCULAR, RESPIRATORY, AND GENITOURINARY DISEASES: ICD-10-CM

## 2023-09-06 DIAGNOSIS — Z13.29 SCREENING FOR THYROID DISORDER: ICD-10-CM

## 2023-09-06 LAB
ALBUMIN SERPL BCP-MCNC: 4.4 G/DL (ref 3.5–5)
ALP SERPL-CCNC: 92 U/L (ref 34–104)
ALT SERPL W P-5'-P-CCNC: 55 U/L (ref 7–52)
ANION GAP SERPL CALCULATED.3IONS-SCNC: 5 MMOL/L
AST SERPL W P-5'-P-CCNC: 36 U/L (ref 13–39)
BASOPHILS # BLD AUTO: 0.06 THOUSANDS/ÂΜL (ref 0–0.1)
BASOPHILS NFR BLD AUTO: 1 % (ref 0–1)
BILIRUB SERPL-MCNC: 0.69 MG/DL (ref 0.2–1)
BUN SERPL-MCNC: 11 MG/DL (ref 5–25)
CALCIUM SERPL-MCNC: 10.6 MG/DL (ref 8.4–10.2)
CHLORIDE SERPL-SCNC: 109 MMOL/L (ref 96–108)
CHOLEST SERPL-MCNC: 173 MG/DL
CO2 SERPL-SCNC: 26 MMOL/L (ref 21–32)
CREAT SERPL-MCNC: 0.71 MG/DL (ref 0.6–1.3)
EOSINOPHIL # BLD AUTO: 0.21 THOUSAND/ÂΜL (ref 0–0.61)
EOSINOPHIL NFR BLD AUTO: 3 % (ref 0–6)
ERYTHROCYTE [DISTWIDTH] IN BLOOD BY AUTOMATED COUNT: 13 % (ref 11.6–15.1)
EST. AVERAGE GLUCOSE BLD GHB EST-MCNC: 111 MG/DL
GFR SERPL CREATININE-BSD FRML MDRD: 98 ML/MIN/1.73SQ M
GLUCOSE P FAST SERPL-MCNC: 107 MG/DL (ref 65–99)
HBA1C MFR BLD: 5.5 %
HCT VFR BLD AUTO: 46.3 % (ref 34.8–46.1)
HCV AB SER QL: NORMAL
HDLC SERPL-MCNC: 37 MG/DL
HGB BLD-MCNC: 15.4 G/DL (ref 11.5–15.4)
HIV 1+2 AB+HIV1 P24 AG SERPL QL IA: NORMAL
HIV 2 AB SERPL QL IA: NORMAL
HIV1 AB SERPL QL IA: NORMAL
HIV1 P24 AG SERPL QL IA: NORMAL
IMM GRANULOCYTES # BLD AUTO: 0.03 THOUSAND/UL (ref 0–0.2)
IMM GRANULOCYTES NFR BLD AUTO: 1 % (ref 0–2)
LDLC SERPL CALC-MCNC: 109 MG/DL (ref 0–100)
LYMPHOCYTES # BLD AUTO: 1.68 THOUSANDS/ÂΜL (ref 0.6–4.47)
LYMPHOCYTES NFR BLD AUTO: 27 % (ref 14–44)
MCH RBC QN AUTO: 28.2 PG (ref 26.8–34.3)
MCHC RBC AUTO-ENTMCNC: 33.3 G/DL (ref 31.4–37.4)
MCV RBC AUTO: 85 FL (ref 82–98)
MONOCYTES # BLD AUTO: 0.44 THOUSAND/ÂΜL (ref 0.17–1.22)
MONOCYTES NFR BLD AUTO: 7 % (ref 4–12)
NEUTROPHILS # BLD AUTO: 3.73 THOUSANDS/ÂΜL (ref 1.85–7.62)
NEUTS SEG NFR BLD AUTO: 61 % (ref 43–75)
NRBC BLD AUTO-RTO: 0 /100 WBCS
PLATELET # BLD AUTO: 220 THOUSANDS/UL (ref 149–390)
PMV BLD AUTO: 10.7 FL (ref 8.9–12.7)
POTASSIUM SERPL-SCNC: 4 MMOL/L (ref 3.5–5.3)
PROT SERPL-MCNC: 6.7 G/DL (ref 6.4–8.4)
RBC # BLD AUTO: 5.46 MILLION/UL (ref 3.81–5.12)
SODIUM SERPL-SCNC: 140 MMOL/L (ref 135–147)
TRIGL SERPL-MCNC: 135 MG/DL
TSH SERPL DL<=0.05 MIU/L-ACNC: 0.82 UIU/ML (ref 0.45–4.5)
WBC # BLD AUTO: 6.15 THOUSAND/UL (ref 4.31–10.16)

## 2023-09-06 PROCEDURE — 80061 LIPID PANEL: CPT

## 2023-09-06 PROCEDURE — 84443 ASSAY THYROID STIM HORMONE: CPT

## 2023-09-06 PROCEDURE — 85025 COMPLETE CBC W/AUTO DIFF WBC: CPT

## 2023-09-06 PROCEDURE — 83036 HEMOGLOBIN GLYCOSYLATED A1C: CPT

## 2023-09-06 PROCEDURE — 87389 HIV-1 AG W/HIV-1&-2 AB AG IA: CPT

## 2023-09-06 PROCEDURE — 86803 HEPATITIS C AB TEST: CPT

## 2023-09-06 PROCEDURE — 80053 COMPREHEN METABOLIC PANEL: CPT

## 2023-09-06 PROCEDURE — 36415 COLL VENOUS BLD VENIPUNCTURE: CPT

## 2023-09-27 ENCOUNTER — TELEPHONE (OUTPATIENT)
Dept: FAMILY MEDICINE CLINIC | Facility: CLINIC | Age: 53
End: 2023-09-27

## 2023-09-27 DIAGNOSIS — E83.52 HYPERCALCEMIA: Primary | ICD-10-CM

## 2023-09-27 NOTE — TELEPHONE ENCOUNTER
9/27/2023 10:58 AM called Krista Wolfe regarding her labs done on 9/6/23  She had to cancel her appointment today     I am concerned about her calcium being up to 10.6 and elevated ALT of 55    I would like her to get the levels rechecked. New orders in chart    Left message on her voicemail to call the office.    Christina Goyal, DO

## 2023-09-28 ENCOUNTER — TELEPHONE (OUTPATIENT)
Age: 53
End: 2023-09-28

## 2023-09-28 ENCOUNTER — APPOINTMENT (OUTPATIENT)
Dept: LAB | Facility: HOSPITAL | Age: 53
End: 2023-09-28
Payer: COMMERCIAL

## 2023-09-28 DIAGNOSIS — E83.52 HYPERCALCEMIA: ICD-10-CM

## 2023-09-28 DIAGNOSIS — E21.3 HYPERPARATHYROIDISM (HCC): Primary | ICD-10-CM

## 2023-09-28 LAB
ALBUMIN SERPL BCP-MCNC: 4.7 G/DL (ref 3.5–5)
ALP SERPL-CCNC: 99 U/L (ref 34–104)
ALT SERPL W P-5'-P-CCNC: 49 U/L (ref 7–52)
ANION GAP SERPL CALCULATED.3IONS-SCNC: 6 MMOL/L
AST SERPL W P-5'-P-CCNC: 27 U/L (ref 13–39)
BILIRUB SERPL-MCNC: 0.73 MG/DL (ref 0.2–1)
BUN SERPL-MCNC: 12 MG/DL (ref 5–25)
CALCIUM SERPL-MCNC: 11.5 MG/DL (ref 8.4–10.2)
CHLORIDE SERPL-SCNC: 106 MMOL/L (ref 96–108)
CO2 SERPL-SCNC: 28 MMOL/L (ref 21–32)
CREAT SERPL-MCNC: 0.76 MG/DL (ref 0.6–1.3)
GFR SERPL CREATININE-BSD FRML MDRD: 89 ML/MIN/1.73SQ M
GLUCOSE SERPL-MCNC: 94 MG/DL (ref 65–140)
POTASSIUM SERPL-SCNC: 4.2 MMOL/L (ref 3.5–5.3)
PROT SERPL-MCNC: 7.1 G/DL (ref 6.4–8.4)
PTH-INTACT SERPL-MCNC: 191.6 PG/ML (ref 12–88)
SODIUM SERPL-SCNC: 140 MMOL/L (ref 135–147)

## 2023-09-28 PROCEDURE — 83970 ASSAY OF PARATHORMONE: CPT

## 2023-09-28 PROCEDURE — 80053 COMPREHEN METABOLIC PANEL: CPT

## 2023-09-28 PROCEDURE — 36415 COLL VENOUS BLD VENIPUNCTURE: CPT

## 2023-09-28 NOTE — TELEPHONE ENCOUNTER
Patient returned Dr Mady Kaufman call. I informed the patient  Dr Mady Kaufman requesting patient repeat her labs and the orders are in . Patient verbalized understanding.

## 2023-09-29 ENCOUNTER — APPOINTMENT (OUTPATIENT)
Dept: LAB | Facility: HOSPITAL | Age: 53
End: 2023-09-29
Payer: COMMERCIAL

## 2023-09-29 DIAGNOSIS — E21.3 HYPERPARATHYROIDISM (HCC): ICD-10-CM

## 2023-09-29 LAB
ANION GAP SERPL CALCULATED.3IONS-SCNC: 7 MMOL/L
BUN SERPL-MCNC: 14 MG/DL (ref 5–25)
CALCIUM SERPL-MCNC: 11.2 MG/DL (ref 8.4–10.2)
CHLORIDE SERPL-SCNC: 105 MMOL/L (ref 96–108)
CO2 SERPL-SCNC: 26 MMOL/L (ref 21–32)
CREAT SERPL-MCNC: 0.78 MG/DL (ref 0.6–1.3)
GFR SERPL CREATININE-BSD FRML MDRD: 87 ML/MIN/1.73SQ M
GLUCOSE SERPL-MCNC: 110 MG/DL (ref 65–140)
POTASSIUM SERPL-SCNC: 4.5 MMOL/L (ref 3.5–5.3)
SODIUM SERPL-SCNC: 138 MMOL/L (ref 135–147)

## 2023-09-29 PROCEDURE — 80048 BASIC METABOLIC PNL TOTAL CA: CPT

## 2023-09-29 PROCEDURE — 36415 COLL VENOUS BLD VENIPUNCTURE: CPT

## 2023-10-02 ENCOUNTER — APPOINTMENT (OUTPATIENT)
Dept: RADIOLOGY | Facility: HOSPITAL | Age: 53
End: 2023-10-02
Attending: FAMILY MEDICINE
Payer: COMMERCIAL

## 2023-10-02 ENCOUNTER — HOSPITAL ENCOUNTER (OUTPATIENT)
Dept: RADIOLOGY | Facility: HOSPITAL | Age: 53
Discharge: HOME | End: 2023-10-02
Attending: FAMILY MEDICINE
Payer: COMMERCIAL

## 2023-10-02 VITALS — BODY MASS INDEX: 38.58 KG/M2 | WEIGHT: 226 LBS | HEIGHT: 64 IN

## 2023-10-02 DIAGNOSIS — Z12.31 ENCOUNTER FOR SCREENING MAMMOGRAM FOR MALIGNANT NEOPLASM OF BREAST: ICD-10-CM

## 2023-10-02 PROCEDURE — 77067 SCR MAMMO BI INCL CAD: CPT

## 2023-10-02 PROCEDURE — 77063 BREAST TOMOSYNTHESIS BI: CPT

## 2023-10-18 ENCOUNTER — CONSULT (OUTPATIENT)
Dept: ENDOCRINOLOGY | Facility: CLINIC | Age: 53
End: 2023-10-18
Payer: COMMERCIAL

## 2023-10-18 VITALS
HEART RATE: 86 BPM | WEIGHT: 226 LBS | DIASTOLIC BLOOD PRESSURE: 76 MMHG | BODY MASS INDEX: 38.58 KG/M2 | OXYGEN SATURATION: 99 % | HEIGHT: 64 IN | SYSTOLIC BLOOD PRESSURE: 108 MMHG

## 2023-10-18 DIAGNOSIS — E83.52 HYPERCALCEMIA: ICD-10-CM

## 2023-10-18 DIAGNOSIS — E21.3 HYPERPARATHYROIDISM (HCC): Primary | ICD-10-CM

## 2023-10-18 PROCEDURE — 99245 OFF/OP CONSLTJ NEW/EST HI 55: CPT | Performed by: STUDENT IN AN ORGANIZED HEALTH CARE EDUCATION/TRAINING PROGRAM

## 2023-10-18 NOTE — PROGRESS NOTES
Vitaly List 48 y.o. female MRN: [id number]    Encounter: [id number]      Assessment/Plan     Primary hyperparathyroidism - serum calcium levels >1 mg/dL above ULN with iPTH levels > 2x ULN. This is inappropriate and consistent with parathyroid disease. I am recommending updated labs today, baseline DXA study including distal 1/3rd radius, and thyroid US and parathyroid sestamibi for localization. On basis of severity of hypercalcemia and age, surgical remediation is being recommended if possible. I anticipate referral to Dr. Cristina Vigil following completion of requested studies. Patient also recommended 800 - 1000 mg daily calcium intake, preferably diet. I will contact patient with their results and with additional recommendations. Problem List Items Addressed This Visit    None  Visit Diagnoses     Hyperparathyroidism ([address])    -  Primary    Relevant Orders    PTH, intact    Vitamin D 25 hydroxy    Phosphorus    Comprehensive metabolic panel    DXA bone density spine hip and pelvis    Calcium, ionized    NM parathyroid scan w spect    US thyroid    Hypercalcemia            RTC TBD    I have spent a total time of 60 minutes on 10/18/23 in caring for this patient including Diagnostic results, Prognosis, Instructions for management, Patient and family education, Impressions, Counseling / Coordination of care, Documenting in the medical record, Reviewing / ordering tests, medicine, procedures  , and Obtaining or reviewing history  . CC: hyperparathyroidism    History of Present Illness     HPI:    Leslie Melendez presents today in evaluation of hyperparathyroidism. She recently completed labs by PCP showing hypercalcemia up to 11.5 mg/dL. In follow up, PTH levels found to be significantly elevated above normal. Patient with symptoms of constipation, reflux, occasional brain fog. No prior history of hypercalcemia. No known family history of hypercalcemia.  No personal history of kidney disease or kidney stones, none reported in family. Patient is vegetarian, does not consume dairy, but may have almond milk in coffee some days. No calcium or vit D supplementation. No personal history of fractures, but there is a fracture history in patient's mother, who had osteoporosis. Patient experiencing menopause over last couple of years. On prescribed medication is protonix. No history of DXA. No EtOH, tobacco, no dental concerns or plans for dental procedures. Review of Systems   Constitutional:  Negative for diaphoresis and unexpected weight change. HENT:  Negative for trouble swallowing and voice change. Gastrointestinal:  Positive for constipation. +reflux   Endocrine: Negative for polydipsia and polyuria. Psychiatric/Behavioral:  Negative for confusion. All other systems reviewed and are negative. Historical Information   Past Medical History:   Diagnosis Date   • Colon polyp    • Concussion with no loss of consciousness 10/02/2015    resolved 2017   • GERD (gastroesophageal reflux disease)      Past Surgical History:   Procedure Laterality Date   • COLONOSCOPY     • NO PAST SURGERIES     • WA COLONOSCOPY FLX DX W/COLLJ SPEC WHEN PFRMD N/A 2018    Procedure: EGD AND COLONOSCOPY;  Surgeon: Placido Alexis MD;  Location: [facility]; Service: Gastroenterology   • WA COLONOSCOPY FLX DX W/COLLJ SPEC WHEN PFRMD N/A 2018    Procedure: COLONOSCOPY;  Surgeon: Placido Alexis MD;  Location: [facility];   Service: Gastroenterology   • UPPER GASTROINTESTINAL ENDOSCOPY       Social History   Social History     Substance and Sexual Activity   Alcohol Use Yes    Comment: social     Social History     Substance and Sexual Activity   Drug Use No     Social History     Tobacco Use   Smoking Status Former   • Types: Cigarettes   • Quit date: 3/19/2014   • Years since quittin.5   Smokeless Tobacco Never     Family History:   Family History   Problem Relation Age of Onset   • Lung cancer Mother    • Hypertension Father    • Lung cancer Father    • Prostate cancer Father 76   • No Known Problems Sister    • No Known Problems Sister    • No Known Problems Sister    • No Known Problems Daughter    • No Known Problems Brother    • No Known Problems Brother    • No Known Problems Son    • No Known Problems Son    • Lung cancer Maternal Aunt    • Colon cancer Paternal Aunt 76       Meds/Allergies   Current Outpatient Medications   Medication Sig Dispense Refill   • pantoprazole (PROTONIX) 40 mg tablet Take 1 tablet (40 mg total) by mouth 2 (two) times a day 180 tablet 1     No current facility-administered medications for this visit. No Known Allergies    Objective   Vitals: Blood pressure 108/76, pulse 86, height 5' 4" (1.626 m), weight 103 kg (226 lb), last menstrual period 04/04/2022, SpO2 99 %, not currently breastfeeding. Physical Exam  Vitals reviewed. Constitutional:       Appearance: Normal appearance. HENT:      Head: Normocephalic and atraumatic. Nose: Nose normal.   Eyes:      General: No scleral icterus. Conjunctiva/sclera: Conjunctivae normal.   Neck:      Thyroid: No thyroid mass, thyromegaly or thyroid tenderness. Pulmonary:      Effort: Pulmonary effort is normal. No respiratory distress. Musculoskeletal:         General: No deformity. Cervical back: Normal range of motion. Lymphadenopathy:      Cervical: No cervical adenopathy. Skin:     General: Skin is warm and dry. Neurological:      General: No focal deficit present. Mental Status: She is alert. Psychiatric:         Mood and Affect: Mood normal.         Behavior: Behavior normal.         The history was obtained from the review of the chart, patient.     Lab Results:   Lab Results   Component Value Date/Time    Potassium 4.5 09/29/2023 09:07 AM    Potassium 4.2 09/28/2023 12:55 PM    Potassium 4.0 09/06/2023 08:14 AM    Chloride 105 09/29/2023 09:07 AM    Chloride 106 09/28/2023 12:55 PM    Chloride 109 (H) 09/06/2023 08:14 AM    CO2 26 09/29/2023 09:07 AM    CO2 28 09/28/2023 12:55 PM    CO2 26 09/06/2023 08:14 AM    BUN 14 09/29/2023 09:07 AM    BUN 12 09/28/2023 12:55 PM    BUN 11 09/06/2023 08:14 AM    Creatinine 0.78 09/29/2023 09:07 AM    Creatinine 0.76 09/28/2023 12:55 PM    Creatinine 0.71 09/06/2023 08:14 AM    Glucose, Fasting 107 (H) 09/06/2023 08:14 AM    Calcium 11.2 (H) 09/29/2023 09:07 AM    Calcium 11.5 (H) 09/28/2023 12:55 PM    Calcium 10.6 (H) 09/06/2023 08:14 AM    eGFR 87 09/29/2023 09:07 AM    eGFR 89 09/28/2023 12:55 PM    eGFR 98 09/06/2023 08:14 AM    TSH 3RD GENERATON 0.819 09/06/2023 08:14 AM    .6 (H) 09/28/2023 12:55 PM         Imaging Studies:   ?  ?  ?  ?  ?  ?  ?    I have personally reviewed pertinent reports. Portions of the record may have been created with voice recognition software. Occasional wrong word or "sound a like" substitutions may have occurred due to the inherent limitations of voice recognition software. Read the chart carefully and recognize, using context, where substitutions have occurred.

## 2023-10-18 NOTE — PATIENT INSTRUCTIONS
Obtain labs at your convenience    Schedule studies for DXA (bone density) plus thyroid US and parathyroid scan    Goal calcium intake 800 - 1000 mg daily, preferably diet    Dietary sources of calcium and the amount of calcium (in milligrams) provided per serving are listed below. The actual amount of calcium may differ slightly depending upon the brand of the food.      Dairy:   1 cup of skim milk (302 mg)  1 cup of 2 percent milk (297 mg)  1 cup of whole milk (291 mg)  1 cup of buttermilk (285 mg)  1 cup of lactose reduced milk (285-302 mg)  1/3 cup of powdered nonfat milk (283 mg)  1 cup of lowfat plain yogurt (415 mg)  1 cup of lowfat fruit yogurt (245-384 mg)  1/2 cup ice cream (100 mg)  1 cup sour cream, cultured (250 mg)  1 cup of cottage cheese made with one percent milk fat (138 mg)  1.5 ounces of part skim mozzarella cheese (275 mg)  1.0 ounces of hard cheese (cheddar or adria) (200 mg)  1 ounce Brie cheese (50 mg)  1 Tablespoon Parmesan Cheese (70 mg)  1 ounce Swiss or Gruyere cheese (270 mg)    Dairy Alternatives:   1 cup Soy milk, calcium fortified (200 to 400 mg)  1 cup [place] milk, calcium fortified (450 mg)      Fruits and Vegetables:   1 cup of calcium-fortified orange juice (300 mg)  1 cup of orange juice from concentrate (20 mg)  1 cup acorn squash, cooked (90 mg)  1 cup raw arugula (125 mg)  1 cup raw bok yon (40 mg)  1 cup chard or okra, cooked (100 mg)  1 cup spinach, cooked (240 mg)  1/2 cup of cooked cait greens (220 mg)  1/2 cup of cooked turnip greens (99 mg)  1/2 cup of steamed broccoli (47 mg)  1/2 cup of cooked kale (45 mg)  1 cup, dried, uncooked figs (300 mg)  1 cup raw kiwi (50 mg)    Legumes:  1 cup garbanzo beans (80 mg)  1/2 cup Legumes, general, cooked (15-50 mg)  1 cup gibson beans (75)  1/2 cup boiled soy beans (100 mg)  1/2 cup tempeh (75 mg)  4 oz Tofu, firm, calcium set (250-750 mg)  4 oz Tofu, soft regular (120-390 mg)  1/2 cup White beans, cooked (70 mg)    Grains:  1/2 to 1 cup Cereals (calcium fortified) (250-1000 mg)  1/2 cup amaranth (161 mg)  1 slice bread, calcium fortified (150-200 mg)  1 cup brown rice, long grain, raw (50 mg)  1 package oatmeal (100-150 mg)  2 corn tortillas 85 mg)    Seafood:   4 canned sardines, with bones (242 mg)  3 ounces of cooked crab (50 mg)  3 ounces of [place], canned with bones (170 to 210 mg)  3 ounces of canned mackerel (250 mg)    Nuts and Seeds:   1 ounce almonds, toasted unblanched (80 mg)  1 ounce sesame seeds, whole roasted (280 mg)  1 ounce (2 Tbsp) Sesame tahini (130 mg)  1 ounce dried sunflower seeds (50 mg)    Desserts:   1/2 cup of frozen yogurt (103 mg)  1/2 cup of vanilla ice cream (85 mg)    Other:  1 tbsp Blackstrap Molasses (135 mg)    What are some ways to add extra calcium to the foods I eat? You can increase your calcium intake by adding foods that contain calcium to the foods you normally eat. For example, non-fat powdered dry milk has about 52 mg of calcium in one tablespoon. You can add powdered milk to several different foods. The following are some ideas for adding extra calcium to your foods:   Add 3 tablespoons of powdered milk to each cup of milk in puddings, cocoa, or custard  Add 4 tablespoons of powdered milk to each cup of hot cereal before cooking  Sift two tablespoons of powdered milk into each cup of flour in cakes, cookies, or breads  Use lowfat or fat free milk instead of water in pancake mix, mashed potatoes, pudding, and hot breakfast cereals  Add lowfat or fat free cheese to salad, soup, or pasta  Add tofu with added calcium to vegetable stir león

## 2023-10-23 ENCOUNTER — APPOINTMENT (OUTPATIENT)
Dept: LAB | Facility: HOSPITAL | Age: 53
End: 2023-10-23
Payer: COMMERCIAL

## 2023-10-23 DIAGNOSIS — E21.3 HYPERPARATHYROIDISM (HCC): ICD-10-CM

## 2023-10-23 LAB
25(OH)D3 SERPL-MCNC: 24.5 NG/ML (ref 30–100)
ALBUMIN SERPL BCP-MCNC: 4.6 G/DL (ref 3.5–5)
ALP SERPL-CCNC: 88 U/L (ref 34–104)
ALT SERPL W P-5'-P-CCNC: 44 U/L (ref 7–52)
ANION GAP SERPL CALCULATED.3IONS-SCNC: 7 MMOL/L
AST SERPL W P-5'-P-CCNC: 28 U/L (ref 13–39)
BILIRUB SERPL-MCNC: 0.76 MG/DL (ref 0.2–1)
BUN SERPL-MCNC: 10 MG/DL (ref 5–25)
CA-I BLD-SCNC: 1.3 MMOL/L (ref 1.12–1.32)
CALCIUM SERPL-MCNC: 10.5 MG/DL (ref 8.4–10.2)
CHLORIDE SERPL-SCNC: 106 MMOL/L (ref 96–108)
CO2 SERPL-SCNC: 27 MMOL/L (ref 21–32)
CREAT SERPL-MCNC: 0.78 MG/DL (ref 0.6–1.3)
GFR SERPL CREATININE-BSD FRML MDRD: 87 ML/MIN/1.73SQ M
GLUCOSE SERPL-MCNC: 96 MG/DL (ref 65–140)
PHOSPHATE SERPL-MCNC: 2.7 MG/DL (ref 2.7–4.5)
POTASSIUM SERPL-SCNC: 4.4 MMOL/L (ref 3.5–5.3)
PROT SERPL-MCNC: 6.8 G/DL (ref 6.4–8.4)
PTH-INTACT SERPL-MCNC: 161.8 PG/ML (ref 12–88)
SODIUM SERPL-SCNC: 140 MMOL/L (ref 135–147)

## 2023-10-23 PROCEDURE — 82330 ASSAY OF CALCIUM: CPT

## 2023-10-23 PROCEDURE — 82306 VITAMIN D 25 HYDROXY: CPT

## 2023-10-23 PROCEDURE — 84100 ASSAY OF PHOSPHORUS: CPT

## 2023-10-23 PROCEDURE — 36415 COLL VENOUS BLD VENIPUNCTURE: CPT

## 2023-10-23 PROCEDURE — 83970 ASSAY OF PARATHORMONE: CPT

## 2023-10-23 PROCEDURE — 80053 COMPREHEN METABOLIC PANEL: CPT

## 2023-10-24 ENCOUNTER — HOSPITAL ENCOUNTER (OUTPATIENT)
Dept: RADIOLOGY | Facility: HOSPITAL | Age: 53
Discharge: HOME | End: 2023-10-24
Attending: STUDENT IN AN ORGANIZED HEALTH CARE EDUCATION/TRAINING PROGRAM
Payer: COMMERCIAL

## 2023-10-24 DIAGNOSIS — E21.3 HYPERPARATHYROIDISM (HCC): ICD-10-CM

## 2023-10-24 PROCEDURE — 76536 US EXAM OF HEAD AND NECK: CPT

## 2023-10-24 PROCEDURE — A9500 TC99M SESTAMIBI: HCPCS

## 2023-10-24 PROCEDURE — G1004 CDSM NDSC: HCPCS

## 2023-10-24 PROCEDURE — 78071 PARATHYRD PLANAR W/WO SUBTRJ: CPT

## 2023-10-25 ENCOUNTER — TELEPHONE (OUTPATIENT)
Age: 53
End: 2023-10-25

## 2023-10-25 DIAGNOSIS — E21.3 HYPERPARATHYROIDISM (HCC): Primary | ICD-10-CM

## 2023-10-25 DIAGNOSIS — Z12.2 SCREENING FOR LUNG CANCER: ICD-10-CM

## 2023-10-25 DIAGNOSIS — R07.9 CHEST PAIN, UNSPECIFIED TYPE: Primary | ICD-10-CM

## 2023-10-25 DIAGNOSIS — Z72.0 NICOTINE ABUSE: ICD-10-CM

## 2023-10-25 NOTE — TELEPHONE ENCOUNTER
Patient calling with concerns regarding her parathyroid. She is having esophogeal to top of chest discomfort. . She states she is worried since her parents had lung ca and she would like to have her ordered chest CT done but it will not get approved thru insurance. She is unsure how to proceed.  Please f/u with patient

## 2023-10-26 ENCOUNTER — TELEPHONE (OUTPATIENT)
Dept: HEMATOLOGY ONCOLOGY | Facility: CLINIC | Age: 53
End: 2023-10-26

## 2023-10-26 NOTE — TELEPHONE ENCOUNTER
Appointment Schedule   Who are you speaking with? Patient   If it is not the patient, are they listed on an active communication consent form? N/A   Which provider is the appointment scheduled with? Dr. Cristina Vigil   At which location is the appointment scheduled for? [place]   When is the appointment scheduled? Please list date and time 12/1/23 @ 215   What is the reason for this appointment? Hyperparathyroidism    Did patient voice understanding of the details of this appointment? Yes   Was the no show policy reviewed with patient?  Yes

## 2023-10-26 NOTE — TELEPHONE ENCOUNTER
10/26/2023 11:48 AM returned call to Tani Pena     We discussed her chest pain  Chest x-ray ordered    We clarified her smoking history. She quit in 2010 around the birth of her son. CT Lung cancer screening ordered. Lung Cancer Screening Shared Decision Making: I discussed with her that she is a candidate for lung cancer CT screening. The following Shared Decision-Making points were covered:  Benefits of screening were discussed, including the rates of reduction in death from lung cancer and other causes. Harms of screening were reviewed, including false positive tests, radiation exposure levels, risks of invasive procedures, risks of complications of screening, and risk of overdiagnosis. I counseled on the importance of adherence to annual lung cancer LDCT screening, impact of co-morbidities, and ability or willingness to undergo diagnosis and treatment. I counseled on the importance of maintaining abstinence as a former smoker or was counseled on the importance of smoking cessation if a current smoker    Review of Eligibility Criteria: She meets all of the criteria for Lung Cancer Screening.   - She is 48  - She has 20 pack year tobacco history and is a current smoker or has quit within the past 15 years  - She presents no signs or symptoms of lung cancer    After discussion, the patient decided to elect lung cancer screening.     Message chetna Beltran,

## 2023-10-31 ENCOUNTER — TELEPHONE (OUTPATIENT)
Dept: HEMATOLOGY ONCOLOGY | Facility: CLINIC | Age: 53
End: 2023-10-31

## 2023-10-31 NOTE — TELEPHONE ENCOUNTER
Appointment Change  Cancel, Reschedule, Change to Virtual      Who are you speaking with? Patient   If it is not the patient, is the caller listed on the communication consent form? N/A   Which provider is the appointment scheduled with? Dr. Mckinley Reason   When was the original appointment scheduled? Please list date and time 12/1/23 230   At which location is the appointment scheduled to take place? [place]   Was the appointment rescheduled? Was the appointment changed from an in person visit to a virtual visit? If so, please list the details of the change. 11/29/23 10am   What is the reason for the appointment change? sooner       Was [facility] transport scheduled? N/A   Does [facility] transport need to be scheduled for the new visit (if applicable) N/A   Does the patient need an infusion appointment rescheduled? N/A   Does the patient have an upcoming infusion appointment scheduled? If so, when? No   Is the patient undergoing chemotherapy? N/A   For appointments cancelled with less than 24 hours:  Was the no-show policy reviewed?  N/A

## 2023-11-15 ENCOUNTER — HOSPITAL ENCOUNTER (OUTPATIENT)
Dept: RADIOLOGY | Facility: HOSPITAL | Age: 53
Discharge: HOME | End: 2023-11-15
Payer: COMMERCIAL

## 2023-11-15 DIAGNOSIS — Z72.0 NICOTINE ABUSE: ICD-10-CM

## 2023-11-15 DIAGNOSIS — Z12.2 SCREENING FOR LUNG CANCER: ICD-10-CM

## 2023-11-15 PROCEDURE — 71271 CT THORAX LUNG CANCER SCR C-: CPT

## 2023-11-21 ENCOUNTER — TELEPHONE (OUTPATIENT)
Dept: FAMILY MEDICINE CLINIC | Facility: CLINIC | Age: 53
End: 2023-11-21

## 2023-11-21 DIAGNOSIS — R91.8 MULTIPLE LUNG NODULES: Primary | ICD-10-CM

## 2023-11-21 NOTE — TELEPHONE ENCOUNTER
11/21/2023 3:54 PM spoke with Maicol Even    Discussed her lung cancer screening results    Referred to pulmonologist and follow up CT scan ordered for 3 month follow up.     She agreed to get the follow up done    Kathya Stevenson DO

## 2023-11-22 ENCOUNTER — TELEPHONE (OUTPATIENT)
Age: 53
End: 2023-11-22

## 2023-11-22 ENCOUNTER — TELEPHONE (OUTPATIENT)
Dept: FAMILY MEDICINE CLINIC | Facility: CLINIC | Age: 53
End: 2023-11-22

## 2023-11-22 DIAGNOSIS — R91.8 MULTIPLE LUNG NODULES: Primary | ICD-10-CM

## 2023-11-22 NOTE — TELEPHONE ENCOUNTER
I spoke to Augie Ornelas today. Dr Ann Grady ordered a PET scan for the patient. Her insurance needs an insurance referral order to do the procedure. Can we please call the insurance and try to obtain the referral by Friday if possible? They want to squeeze her in by Monday if we can. She will be going to [facility].     Melanie Coreas CMA

## 2023-11-22 NOTE — TELEPHONE ENCOUNTER
Patient is requesting pet scan ordered due to lung nodules found. Please advise in dr. Mehdi Gomez absence?     Jenny Nino CMA

## 2023-11-22 NOTE — TELEPHONE ENCOUNTER
Patient called regarding a PET Scan . Patient has an appt 11/29 with Surgical  Oncologist.Patient would like the test completed prior to the appt. Please advise.

## 2023-11-24 NOTE — TELEPHONE ENCOUNTER
I spoke to Abilio, who is the Nurse supervisor at [facility]. As soon as we have an approval we need to call and let them know so they can get the patient in asap.     Vita Riedel  CMA

## 2023-11-27 ENCOUNTER — TELEPHONE (OUTPATIENT)
Dept: HEMATOLOGY ONCOLOGY | Facility: CLINIC | Age: 53
End: 2023-11-27

## 2023-11-27 NOTE — TELEPHONE ENCOUNTER
Spoke with Ysabel Sifuentes from [address] she is the nursing supervisor, Ysabel Sifuentes would like a call back in regards to the insurance referral for the patient. Please advise. Kendrick Lennox

## 2023-11-27 NOTE — TELEPHONE ENCOUNTER
Patient Call    Who are you speaking with? Patient    If it is not the patient, are they listed on an active communication consent form? N/A   What is the reason for this call? Patient would like to see if Dr. Tony Alejandre can also look at her CT scan she had done on 11/15/23 as well at the time of her consult as well   Does this require a call back? Yes   If a call back is required, please list best call back number [phone]   If a call back is required, advise that a message will be forwarded to their care team and someone will return their call as soon as possible. Did you relay this information to the patient?  Yes

## 2023-11-28 PROBLEM — E83.52 HYPERCALCEMIA: Status: ACTIVE | Noted: 2023-11-28

## 2023-11-28 PROBLEM — E21.3 HYPERPARATHYROIDISM (HCC): Status: ACTIVE | Noted: 2023-11-28

## 2023-11-29 ENCOUNTER — CONSULT (OUTPATIENT)
Dept: SURGICAL ONCOLOGY | Facility: CLINIC | Age: 53
End: 2023-11-29
Payer: COMMERCIAL

## 2023-11-29 VITALS
SYSTOLIC BLOOD PRESSURE: 130 MMHG | RESPIRATION RATE: 18 BRPM | BODY MASS INDEX: 38.38 KG/M2 | WEIGHT: 224.8 LBS | HEART RATE: 80 BPM | HEIGHT: 64 IN | TEMPERATURE: 97.7 F | OXYGEN SATURATION: 97 % | DIASTOLIC BLOOD PRESSURE: 86 MMHG

## 2023-11-29 DIAGNOSIS — E83.52 HYPERCALCEMIA: ICD-10-CM

## 2023-11-29 DIAGNOSIS — E21.3 HYPERPARATHYROIDISM (HCC): Primary | ICD-10-CM

## 2023-11-29 PROCEDURE — 99244 OFF/OP CNSLTJ NEW/EST MOD 40: CPT | Performed by: SURGERY

## 2023-11-29 RX ORDER — TRAMADOL HYDROCHLORIDE 50 MG/1
50 TABLET ORAL EVERY 6 HOURS PRN
Qty: 10 TABLET | Refills: 0 | Status: SHIPPED | OUTPATIENT
Start: 2023-11-29

## 2023-11-29 NOTE — LETTER
November 29, 2023     Asuncion Aquino DO  [address] [facility]   [address]    Patient: Monica Garcia   YOB: 1970   Date of Visit: 11/29/2023       Dear Dr. Scotty Bloom: Thank you for referring Monica Garcia to me for evaluation. Below are my notes for this consultation. If you have questions, please do not hesitate to call me. I look forward to following your patient along with you. Sincerely,        Bernadine López MD        CC: MD Aliza Bynum DO Donnetta Fountain, MD  11/29/2023 11:03 AM  Sign when Signing Visit     Surgical Oncology Follow Up       [address] [facility]  [address] [facility] [address]    Monica Garcia  1970  [id number]  [address] [facility] [place] [id number]    Chief Complaint   Patient presents with   • Consult     Patient being seen for consult for .8, Ca 10.5. US thyroid & sestamibi 10/24/2023- poss right. CT lung 11/15/2023. Family h/x of lung cancer. Assessment/Plan:    No problem-specific Assessment & Plan notes found for this encounter. Diagnoses and all orders for this visit:    Hyperparathyroidism [facility])  -     Ambulatory referral to Surgical Oncology    Hypercalcemia      Advance Care Planning/Advance Directives:  Discussed disease status, cancer treatment plans and/or cancer treatment goals with the patient. Oncology History    No history exists. History of Present Illness: Patient is a 63-year-old woman who was found to have high calcium levels on routine blood work. -Interval History: Additional work-up revealed high PTH levels to correspond. She was worked up for primary hyperparathyroidism and underwent a CT scan which confirmed a right-sided target. She reports excessive fatigue, brain fog, depression/anxiety, and overall body aches and pains.   She has had bad reflux as well and constipation. No history of kidney stones. No personal or family history of endocrine malignancies. Review of Systems:  Review of Systems   Constitutional:  Positive for fatigue. HENT:  Positive for trouble swallowing. Eyes: Negative. Respiratory: Negative. Cardiovascular: Negative. Gastrointestinal:  Positive for abdominal distention and constipation. Reflux   Endocrine: Negative. Genitourinary: Negative. Musculoskeletal:  Positive for arthralgias, back pain, joint swelling and myalgias. Skin: Negative. Allergic/Immunologic: Negative. Neurological: Negative. Hematological: Negative. Psychiatric/Behavioral:  Positive for decreased concentration. The patient is nervous/anxious. All other systems reviewed and are negative. Patient Active Problem List   Diagnosis   • Dyspepsia   • Change in bowel habits   • Fatty liver   • Hiatal hernia   • History of adenomatous polyp of colon   • Alvares's esophagus without dysplasia   • Gastroesophageal reflux disease   • Obesity   • Hyperparathyroidism ([address])   • Hypercalcemia     Past Medical History:   Diagnosis Date   • Colon polyp    • Concussion with no loss of consciousness 10/02/2015    resolved 06/09/2017   • GERD (gastroesophageal reflux disease)      Past Surgical History:   Procedure Laterality Date   • COLONOSCOPY     • NO PAST SURGERIES     • CO COLONOSCOPY FLX DX W/COLLJ SPEC WHEN PFRMD N/A 04/06/2018    Procedure: EGD AND COLONOSCOPY;  Surgeon: Karmen Shetty MD;  [facility]; Service: Gastroenterology   • CO COLONOSCOPY FLX DX W/COLLJ SPEC WHEN PFRMD N/A 06/25/2018    Procedure: COLONOSCOPY;  Surgeon: Karmen Shetty MD;  Location[facility];   Service: Gastroenterology   • UPPER GASTROINTESTINAL ENDOSCOPY       Family History   Problem Relation Age of Onset   • Lung cancer Mother    • Hypertension Father    • Lung cancer Father    • Prostate cancer Father 76   • No Known Problems Sister    • No Known Problems Sister    • No Known Problems Sister    • No Known Problems Daughter    • No Known Problems Brother    • No Known Problems Brother    • No Known Problems Son    • No Known Problems Son    • Lung cancer Maternal Aunt    • Colon cancer Paternal Aunt 76     Social History     Socioeconomic History   • Marital status: /Civil Union     Spouse name: Not on file   • Number of children: Not on file   • Years of education: Not on file   • Highest education level: Not on file   Occupational History   • Not on file   Tobacco Use   • Smoking status: Former     Years: .     Types: Cigarettes     Quit date:      Years since quittin.9   • Smokeless tobacco: Never   Vaping Use   • Vaping Use: Never used   Substance and Sexual Activity   • Alcohol use: Yes     Comment: social   • Drug use: No   • Sexual activity: Yes     Partners: Male   Other Topics Concern   • Not on file   Social History Narrative   • Not on file     Social Determinants of Health     Financial Resource Strain: Not on file   Food Insecurity: Not on file   Transportation Needs: Not on file   Physical Activity: Not on file   Stress: Not on file   Social Connections: Not on file   Intimate Partner Violence: Not on file   Housing Stability: Not on file       Current Outpatient Medications:   •  pantoprazole (PROTONIX) 40 mg tablet, Take 1 tablet (40 mg total) by mouth 2 (two) times a day, Disp: 180 tablet, Rfl: 1  No Known Allergies  Vitals:    23 1010   BP: 130/86   Pulse: 80   Resp: 18   Temp: 97.7 °F (36.5 °C)   SpO2: 97%       Physical Exam  Vitals reviewed. Constitutional:       Appearance: Normal appearance. HENT:      Head: Normocephalic and atraumatic. Right Ear: Tympanic membrane normal.      Left Ear: Tympanic membrane normal.   Eyes:      Extraocular Movements: Extraocular movements intact. Pupils: Pupils are equal, round, and reactive to light.    Cardiovascular: Rate and Rhythm: Regular rhythm. Pulses: Normal pulses. Heart sounds: Normal heart sounds. Pulmonary:      Breath sounds: Normal breath sounds. Abdominal:      General: Abdomen is flat. Palpations: Abdomen is soft. Musculoskeletal:         General: Normal range of motion. Cervical back: Normal range of motion and neck supple. No rigidity. Lymphadenopathy:      Cervical: No cervical adenopathy. Skin:     General: Skin is warm and dry. Neurological:      General: No focal deficit present. Mental Status: She is alert and oriented to person, place, and time. Psychiatric:         Mood and Affect: Mood normal.         Behavior: Behavior normal.         Thought Content: Thought content normal.         Judgment: Judgment normal.           Results:  Labs:  Lab Results   Component Value Date    .8 (H) 10/23/2023    CALCIUM 10.5 (H) 10/23/2023    PHOS 2.7 10/23/2023         Imaging    PARATHYROID SCAN     INDICATION:  E21.3: Hyperparathyroidism, unspecified     COMPARISON: Thyroid ultrasound 10/24/2023     TECHNIQUE:   Following the intravenous administration of 26 mCi Tc-99m Cardiolite, anterior and bilateral anterior oblique projection images of the neck and mediastinum were obtained at approximately 10 minutes post injection followed at 2 hours post   injection by static anterior and bilateral oblique projections as well as SPECT images in coronal, sagittal and axial projections. FINDINGS:  Immediate planar images demonstrate focal nodular activity overlying the mid right thyroid. This persists on delayed planar and SPECT images, and corresponds with the nodule seen on thyroid ultrasound. This is most suggestive of a parathyroid adenoma. Thyroid activity is otherwise homogeneous. IMPRESSION:     1. Persistent focal radiotracer activity overlying the mid right thyroid corresponding with the nodule seen on the ultrasound, most suggestive of a parathyroid adenoma. Workstation performed: [id number]    THYROID ULTRASOUND     INDICATION:    E21.3: Hyperparathyroidism, unspecified. COMPARISON: Parathyroid scan 10/24/2023     TECHNIQUE:   Ultrasound of the thyroid was performed with a high frequency linear transducer in transverse and sagittal planes including volumetric imaging sweeps as well as traditional still imaging technique. FINDINGS:  Normal homogeneous smooth echotexture. Right lobe: 6.7 x 2.2 x 2.4 cm. Volume 16.6 mL  Left lobe:  5.5 x 1.5 x 1.9 cm. Volume 7.5 mL  Isthmus: 0.3  cm. There is a hypoechoic nodule posterior to the mid right thyroid measuring 1.8 x 0.9 x 1.8 cm. This corresponds with the nodule seen on nuclear medicine parathyroid scan, likely a parathyroid adenoma. There are no dominant thyroid nodules that meet current ACR criteria for biopsy or follow-up. IMPRESSION:  1. 1.8 x 0.9 x 1.8 cm hypoechoic nodule posterior to the mid right thyroid, likely a parathyroid adenoma  CT lung screening program    Result Date: 11/21/2023  Narrative: CT CHEST LUNG CANCER SCREENING WITHOUT IV CONTRAST INDICATION:   Z12.2: Encounter for screening for malignant neoplasm of respiratory organs Z72.0: Tobacco use. COMPARISON: None. TECHNIQUE:  Unenhanced CT examination of the chest was performed utilizing a low dose protocol. Reformatted images were created in axial, sagittal, and coronal planes. Radiation dose length product (DLP) for this visit:  275.96 mGy-cm . This examination, like all CT scans performed in the [facility], was performed utilizing techniques to minimize radiation dose exposure, including the use of iterative  reconstruction and automated exposure control. FINDINGS: LARGE AIRWAYS: Large airways are clear with no tracheal or endobronchial lesion. LUNGS: No consolidation. No edema. Linear atelectasis/scarring in the middle lobe.  The following solid, noncalcified nodules are identified on series 73: - Right upper lobe 3 mm nodule on image 23. - Left upper lobe 3 mm juxtapleural nodule on image 31. - Right lower lobe 3 mm nodule on image 54. - Left upper lobe lingular 8 mm nodule on image 66. - Left upper lobe lingular 4 mm nodule on image 91. - Additional 2 mm nodules on image 25, 42, 65. PLEURA: No pleural effusion or pneumothorax. HEART: Normal cardiac size and morphology. No coronary artery calcification. No pericardial effusion or thickening. VESSELS: Normal caliber thoracic aorta with no detectable atherosclerotic plaque. Normal caliber main pulmonary artery. MEDIASTINUM AND PAPI: Within normal limits. CHEST WALL AND LOWER NECK:   Within normal limits. VISUALIZED STRUCTURES IN THE UPPER ABDOMEN: Cholelithiasis without acute cholecystitis. Diffuse hepatic steatosis. OSSEOUS STRUCTURES: No acute fracture or destructive osseous lesion. Mild multilevel spondylosis. Vertebral body height and alignment is maintained. Impression: 1. Multiple pulmonary nodules, the largest measuring 8 mm in the left upper lobe. 2.  Hepatic steatosis. Lung-RADS 4A, suspicious. Three-month followup LDCT; or PET/CT may be used when there is a greater than or equal to 8 mm solid component. The study was marked in EPIC for significant notification, as well as for follow-up. Workstation performed: [id number]     I reviewed the above laboratory and imaging data. Discussion/Summary: 59-year-old woman, very hyperparathyroidism, right-sided target seen on sestamibi scan and ultrasound. She is amenable to and a candidate for minimally invasive right parathyroidectomy, possible further exploration, with intraoperative PTH monitoring. Rationale for this along with risk benefits of surgery discussed. All questions answered and consent signed at this visit.

## 2023-11-29 NOTE — H&P (VIEW-ONLY)
Surgical Oncology Follow Up       [address]  [facility]ER [facility] [place]  [address]  [facility] [address]    Ritu Rodriguez  1970  [id number]  [address]  [facility] [place]  [address] [place] [address]    Chief Complaint   Patient presents with    Consult     Patient being seen for consult for .8, Ca 10.5. US thyroid & sestamibi 10/24/2023- poss right. CT lung 11/15/2023. Family h/x of lung cancer.       Assessment/Plan:    No problem-specific Assessment & Plan notes found for this encounter.       Diagnoses and all orders for this visit:    Hyperparathyroidism (HCC)  -     Ambulatory referral to Surgical Oncology    Hypercalcemia      Advance Care Planning/Advance Directives:  Discussed disease status, cancer treatment plans and/or cancer treatment goals with the patient.     Oncology History    No history exists.       History of Present Illness: Patient is a 53-year-old woman who was found to have high calcium levels on routine blood work.  -Interval History: Additional work-up revealed high PTH levels to correspond.  She was worked up for primary hyperparathyroidism and underwent a CT scan which confirmed a right-sided target.  She reports excessive fatigue, brain fog, depression/anxiety, and overall body aches and pains.  She has had bad reflux as well and constipation.  No history of kidney stones.  No personal or family history of endocrine malignancies.        Review of Systems:  Review of Systems   Constitutional:  Positive for fatigue.   HENT:  Positive for trouble swallowing.    Eyes: Negative.    Respiratory: Negative.     Cardiovascular: Negative.    Gastrointestinal:  Positive for abdominal distention and constipation.        Reflux   Endocrine: Negative.    Genitourinary: Negative.    Musculoskeletal:  Positive for arthralgias, back pain, joint swelling and myalgias.   Skin: Negative.     Allergic/Immunologic: Negative.    Neurological: Negative.    Hematological: Negative.    Psychiatric/Behavioral:  Positive for decreased concentration. The patient is nervous/anxious.    All other systems reviewed and are negative.      Patient Active Problem List   Diagnosis    Dyspepsia    Change in bowel habits    Fatty liver    Hiatal hernia    History of adenomatous polyp of colon    Alvares's esophagus without dysplasia    Gastroesophageal reflux disease    Obesity    Hyperparathyroidism (HCC)    Hypercalcemia     Past Medical History:   Diagnosis Date    Colon polyp     Concussion with no loss of consciousness 10/02/2015    resolved 06/09/2017    GERD (gastroesophageal reflux disease)      Past Surgical History:   Procedure Laterality Date    COLONOSCOPY      NO PAST SURGERIES      [facility] COLONOSCOPY FLX DX W/COLLJ SPEC WHEN PFRMD N/A 04/06/2018    Procedure: EGD AND COLONOSCOPY;  Surgeon: Ottoniel Palumbo MD;  [facility];  Service: Gastroenterology    [facility] COLONOSCOPY FLX DX W/COLLJ SPEC WHEN PFRMD N/A 06/25/2018    Procedure: COLONOSCOPY;  Surgeon: Ottoniel Palumbo MD;  [facility];  Service: Gastroenterology    UPPER GASTROINTESTINAL ENDOSCOPY       Family History   Problem Relation Age of Onset    Lung cancer Mother     Hypertension Father     Lung cancer Father     Prostate cancer Father 75    No Known Problems Sister     No Known Problems Sister     No Known Problems Sister     No Known Problems Daughter     No Known Problems Brother     No Known Problems Brother     No Known Problems Son     No Known Problems Son     Lung cancer Maternal Aunt     Colon cancer Paternal Aunt 68     Social History     Socioeconomic History    Marital status: /Civil Union     Spouse name: Not on file    Number of children: Not on file    Years of education: Not on file    Highest education level: Not on file   Occupational History    Not on file   Tobacco Use    Smoking status: Former      Years: 25.00     Types: Cigarettes     Quit date:      Years since quittin.9    Smokeless tobacco: Never   Vaping Use    Vaping Use: Never used   Substance and Sexual Activity    Alcohol use: Yes     Comment: social    Drug use: No    Sexual activity: Yes     Partners: Male   Other Topics Concern    Not on file   Social History Narrative    Not on file     Social Determinants of Health     Financial Resource Strain: Not on file   Food Insecurity: Not on file   Transportation Needs: Not on file   Physical Activity: Not on file   Stress: Not on file   Social Connections: Not on file   Intimate Partner Violence: Not on file   Housing Stability: Not on file       Current Outpatient Medications:     pantoprazole (PROTONIX) 40 mg tablet, Take 1 tablet (40 mg total) by mouth 2 (two) times a day, Disp: 180 tablet, Rfl: 1  No Known Allergies  Vitals:    23 1010   BP: 130/86   Pulse: 80   Resp: 18   Temp: 97.7 °F (36.5 °C)   SpO2: 97%       Physical Exam  Vitals reviewed.   Constitutional:       Appearance: Normal appearance.   HENT:      Head: Normocephalic and atraumatic.      Right Ear: Tympanic membrane normal.      Left Ear: Tympanic membrane normal.   Eyes:      Extraocular Movements: Extraocular movements intact.      Pupils: Pupils are equal, round, and reactive to light.   Cardiovascular:      Rate and Rhythm: Regular rhythm.      Pulses: Normal pulses.      Heart sounds: Normal heart sounds.   Pulmonary:      Breath sounds: Normal breath sounds.   Abdominal:      General: Abdomen is flat.      Palpations: Abdomen is soft.   Musculoskeletal:         General: Normal range of motion.      Cervical back: Normal range of motion and neck supple. No rigidity.   Lymphadenopathy:      Cervical: No cervical adenopathy.   Skin:     General: Skin is warm and dry.   Neurological:      General: No focal deficit present.      Mental Status: She is alert and oriented to person, place, and time.   Psychiatric:          Mood and Affect: Mood normal.         Behavior: Behavior normal.         Thought Content: Thought content normal.         Judgment: Judgment normal.           Results:  Labs:  Lab Results   Component Value Date    .8 (H) 10/23/2023    CALCIUM 10.5 (H) 10/23/2023    PHOS 2.7 10/23/2023         Imaging    PARATHYROID SCAN     INDICATION:  E21.3: Hyperparathyroidism, unspecified     COMPARISON: Thyroid ultrasound 10/24/2023     TECHNIQUE:   Following the intravenous administration of 26 mCi Tc-99m Cardiolite, anterior and bilateral anterior oblique projection images of the neck and mediastinum were obtained at approximately 10 minutes post injection followed at 2 hours post   injection by static anterior and bilateral oblique projections as well as SPECT images in coronal, sagittal and axial projections.     FINDINGS:  Immediate planar images demonstrate focal nodular activity overlying the mid right thyroid. This persists on delayed planar and SPECT images, and corresponds with the nodule seen on thyroid ultrasound. This is most suggestive of a parathyroid adenoma.   Thyroid activity is otherwise homogeneous.     IMPRESSION:     1. Persistent focal radiotracer activity overlying the mid right thyroid corresponding with the nodule seen on the ultrasound, most suggestive of a parathyroid adenoma.     Workstation performed: [id number]    THYROID ULTRASOUND     INDICATION:    E21.3: Hyperparathyroidism, unspecified.     COMPARISON: Parathyroid scan 10/24/2023     TECHNIQUE:   Ultrasound of the thyroid was performed with a high frequency linear transducer in transverse and sagittal planes including volumetric imaging sweeps as well as traditional still imaging technique.     FINDINGS:  Normal homogeneous smooth echotexture.     Right lobe: 6.7 x 2.2 x 2.4 cm. Volume 16.6 mL  Left lobe:  5.5 x 1.5 x 1.9 cm. Volume 7.5 mL  Isthmus: 0.3  cm.     There is a hypoechoic nodule posterior to the mid right thyroid  measuring 1.8 x 0.9 x 1.8 cm. This corresponds with the nodule seen on nuclear medicine parathyroid scan, likely a parathyroid adenoma.     There are no dominant thyroid nodules that meet current ACR criteria for biopsy or follow-up.     IMPRESSION:  1. 1.8 x 0.9 x 1.8 cm hypoechoic nodule posterior to the mid right thyroid, likely a parathyroid adenoma  CT lung screening program    Result Date: 11/21/2023  Narrative: CT CHEST LUNG CANCER SCREENING WITHOUT IV CONTRAST INDICATION:   Z12.2: Encounter for screening for malignant neoplasm of respiratory organs Z72.0: Tobacco use. COMPARISON: None. TECHNIQUE:  Unenhanced CT examination of the chest was performed utilizing a low dose protocol.  Reformatted images were created in axial, sagittal, and coronal planes. Radiation dose length product (DLP) for this visit:  275.96 mGy-cm .  This examination, like all CT scans performed in the [facility], was performed utilizing techniques to minimize radiation dose exposure, including the use of iterative  reconstruction and automated exposure control. FINDINGS: LARGE AIRWAYS: Large airways are clear with no tracheal or endobronchial lesion. LUNGS: No consolidation. No edema. Linear atelectasis/scarring in the middle lobe. The following solid, noncalcified nodules are identified on series 73: - Right upper lobe 3 mm nodule on image 23. - Left upper lobe 3 mm juxtapleural nodule on image 31. - Right lower lobe 3 mm nodule on image 54. - Left upper lobe lingular 8 mm nodule on image 66. - Left upper lobe lingular 4 mm nodule on image 91. - Additional 2 mm nodules on image 25, 42, 65. PLEURA: No pleural effusion or pneumothorax. HEART: Normal cardiac size and morphology. No coronary artery calcification. No pericardial effusion or thickening. VESSELS: Normal caliber thoracic aorta with no detectable atherosclerotic plaque. Normal caliber main pulmonary artery. MEDIASTINUM AND PAPI: Within normal limits. CHEST WALL  AND LOWER NECK:   Within normal limits. VISUALIZED STRUCTURES IN THE UPPER ABDOMEN: Cholelithiasis without acute cholecystitis. Diffuse hepatic steatosis. OSSEOUS STRUCTURES: No acute fracture or destructive osseous lesion. Mild multilevel spondylosis. Vertebral body height and alignment is maintained.     Impression: 1.  Multiple pulmonary nodules, the largest measuring 8 mm in the left upper lobe. 2.  Hepatic steatosis.   Lung-RADS 4A, suspicious.  Three-month followup LDCT; or PET/CT may be used when there is a greater than or equal to 8 mm solid component. The study was marked in EPIC for significant notification, as well as for follow-up. Workstation performed: [id number]     I reviewed the above laboratory and imaging data.    Discussion/Summary: 53-year-old woman, very hyperparathyroidism, right-sided target seen on sestamibi scan and ultrasound.  She is amenable to and a candidate for minimally invasive right parathyroidectomy, possible further exploration, with intraoperative PTH monitoring.  Rationale for this along with risk benefits of surgery discussed.  All questions answered and consent signed at this visit.

## 2023-11-29 NOTE — PROGRESS NOTES
Surgical Oncology Follow Up       [address] [facility] [place]  [facility] [address]    Sade Davion  1970  [id number]  [facility]  CANCER [facility] SURGICAL ONCOLOGY [place] [address] [facility] [address]    Chief Complaint   Patient presents with    Consult     Patient being seen for consult for .8, Ca 10.5. US thyroid & sestamibi 10/24/2023- poss right. CT lung 11/15/2023. Family h/x of lung cancer. Assessment/Plan:    No problem-specific Assessment & Plan notes found for this encounter. Diagnoses and all orders for this visit:    Hyperparathyroidism [facility])  -     Ambulatory referral to Surgical Oncology    Hypercalcemia      Advance Care Planning/Advance Directives:  Discussed disease status, cancer treatment plans and/or cancer treatment goals with the patient. Oncology History    No history exists. History of Present Illness: Patient is a 51-year-old woman who was found to have high calcium levels on routine blood work. -Interval History: Additional work-up revealed high PTH levels to correspond. She was worked up for primary hyperparathyroidism and underwent a CT scan which confirmed a right-sided target. She reports excessive fatigue, brain fog, depression/anxiety, and overall body aches and pains. She has had bad reflux as well and constipation. No history of kidney stones. No personal or family history of endocrine malignancies. Review of Systems:  Review of Systems   Constitutional:  Positive for fatigue. HENT:  Positive for trouble swallowing. Eyes: Negative. Respiratory: Negative. Cardiovascular: Negative. Gastrointestinal:  Positive for abdominal distention and constipation. Reflux   Endocrine: Negative. Genitourinary: Negative. Musculoskeletal:  Positive for arthralgias, back pain, joint swelling and myalgias. Skin: Negative. Allergic/Immunologic: Negative. Neurological: Negative. Hematological: Negative. Psychiatric/Behavioral:  Positive for decreased concentration. The patient is nervous/anxious. All other systems reviewed and are negative. Patient Active Problem List   Diagnosis    Dyspepsia    Change in bowel habits    Fatty liver    Hiatal hernia    History of adenomatous polyp of colon    Alvares's esophagus without dysplasia    Gastroesophageal reflux disease    Obesity    Hyperparathyroidism ([address])    Hypercalcemia     Past Medical History:   Diagnosis Date    Colon polyp     Concussion with no loss of consciousness 10/02/2015    resolved 06/09/2017    GERD (gastroesophageal reflux disease)      Past Surgical History:   Procedure Laterality Date    COLONOSCOPY      NO PAST SURGERIES      OK COLONOSCOPY FLX DX W/COLLJ SPEC WHEN PFRMD N/A 04/06/2018    Procedure: EGD AND COLONOSCOPY;  Surgeon: Davey Ormond, MD;  [facility]; Service: Gastroenterology    OK COLONOSCOPY FLX DX W/COLLJ SPEC WHEN PFRMD N/A 06/25/2018    Procedure: COLONOSCOPY;  Surgeon: Davey Ormond, MD;  Location: [facility];   Service: Gastroenterology    UPPER GASTROINTESTINAL ENDOSCOPY       Family History   Problem Relation Age of Onset    Lung cancer Mother     Hypertension Father     Lung cancer Father     Prostate cancer Father 76    No Known Problems Sister     No Known Problems Sister     No Known Problems Sister     No Known Problems Daughter     No Known Problems Brother     No Known Problems Brother     No Known Problems Son     No Known Problems Son     Lung cancer Maternal Aunt     Colon cancer Paternal Aunt 76     Social History     Socioeconomic History    Marital status: /Civil Union     Spouse name: Not on file    Number of children: Not on file    Years of education: Not on file    Highest education level: Not on file   Occupational History    Not on file   Tobacco Use    Smoking status: Former Years: 25.00     Types: Cigarettes     Quit date:      Years since quittin.9    Smokeless tobacco: Never   Vaping Use    Vaping Use: Never used   Substance and Sexual Activity    Alcohol use: Yes     Comment: social    Drug use: No    Sexual activity: Yes     Partners: Male   Other Topics Concern    Not on file   Social History Narrative    Not on file     Social Determinants of Health     Financial Resource Strain: Not on file   Food Insecurity: Not on file   Transportation Needs: Not on file   Physical Activity: Not on file   Stress: Not on file   Social Connections: Not on file   Intimate Partner Violence: Not on file   Housing Stability: Not on file       Current Outpatient Medications:     pantoprazole (PROTONIX) 40 mg tablet, Take 1 tablet (40 mg total) by mouth 2 (two) times a day, Disp: 180 tablet, Rfl: 1  No Known Allergies  Vitals:    23 1010   BP: 130/86   Pulse: 80   Resp: 18   Temp: 97.7 °F (36.5 °C)   SpO2: 97%       Physical Exam  Vitals reviewed. Constitutional:       Appearance: Normal appearance. HENT:      Head: Normocephalic and atraumatic. Right Ear: Tympanic membrane normal.      Left Ear: Tympanic membrane normal.   Eyes:      Extraocular Movements: Extraocular movements intact. Pupils: Pupils are equal, round, and reactive to light. Cardiovascular:      Rate and Rhythm: Regular rhythm. Pulses: Normal pulses. Heart sounds: Normal heart sounds. Pulmonary:      Breath sounds: Normal breath sounds. Abdominal:      General: Abdomen is flat. Palpations: Abdomen is soft. Musculoskeletal:         General: Normal range of motion. Cervical back: Normal range of motion and neck supple. No rigidity. Lymphadenopathy:      Cervical: No cervical adenopathy. Skin:     General: Skin is warm and dry. Neurological:      General: No focal deficit present. Mental Status: She is alert and oriented to person, place, and time.    Psychiatric: Mood and Affect: Mood normal.         Behavior: Behavior normal.         Thought Content: Thought content normal.         Judgment: Judgment normal.           Results:  Labs:  Lab Results   Component Value Date    .8 (H) 10/23/2023    CALCIUM 10.5 (H) 10/23/2023    PHOS 2.7 10/23/2023         Imaging    PARATHYROID SCAN     INDICATION:  E21.3: Hyperparathyroidism, unspecified     COMPARISON: Thyroid ultrasound 10/24/2023     TECHNIQUE:   Following the intravenous administration of 26 mCi Tc-99m Cardiolite, anterior and bilateral anterior oblique projection images of the neck and mediastinum were obtained at approximately 10 minutes post injection followed at 2 hours post   injection by static anterior and bilateral oblique projections as well as SPECT images in coronal, sagittal and axial projections. FINDINGS:  Immediate planar images demonstrate focal nodular activity overlying the mid right thyroid. This persists on delayed planar and SPECT images, and corresponds with the nodule seen on thyroid ultrasound. This is most suggestive of a parathyroid adenoma. Thyroid activity is otherwise homogeneous. IMPRESSION:     1. Persistent focal radiotracer activity overlying the mid right thyroid corresponding with the nodule seen on the ultrasound, most suggestive of a parathyroid adenoma. Workstation performed: [id number]    THYROID ULTRASOUND     INDICATION:    E21.3: Hyperparathyroidism, unspecified. COMPARISON: Parathyroid scan 10/24/2023     TECHNIQUE:   Ultrasound of the thyroid was performed with a high frequency linear transducer in transverse and sagittal planes including volumetric imaging sweeps as well as traditional still imaging technique. FINDINGS:  Normal homogeneous smooth echotexture. Right lobe: 6.7 x 2.2 x 2.4 cm. Volume 16.6 mL  Left lobe:  5.5 x 1.5 x 1.9 cm. Volume 7.5 mL  Isthmus: 0.3  cm.      There is a hypoechoic nodule posterior to the mid right thyroid measuring 1.8 x 0.9 x 1.8 cm. This corresponds with the nodule seen on nuclear medicine parathyroid scan, likely a parathyroid adenoma. There are no dominant thyroid nodules that meet current ACR criteria for biopsy or follow-up. IMPRESSION:  1. 1.8 x 0.9 x 1.8 cm hypoechoic nodule posterior to the mid right thyroid, likely a parathyroid adenoma  CT lung screening program    Result Date: 11/21/2023  Narrative: CT CHEST LUNG CANCER SCREENING WITHOUT IV CONTRAST INDICATION:   Z12.2: Encounter for screening for malignant neoplasm of respiratory organs Z72.0: Tobacco use. COMPARISON: None. TECHNIQUE:  Unenhanced CT examination of the chest was performed utilizing a low dose protocol. Reformatted images were created in axial, sagittal, and coronal planes. Radiation dose length product (DLP) for this visit:  275.96 mGy-cm . This examination, like all CT scans performed in the [facility], was performed utilizing techniques to minimize radiation dose exposure, including the use of iterative  reconstruction and automated exposure control. FINDINGS: LARGE AIRWAYS: Large airways are clear with no tracheal or endobronchial lesion. LUNGS: No consolidation. No edema. Linear atelectasis/scarring in the middle lobe. The following solid, noncalcified nodules are identified on series 73: - Right upper lobe 3 mm nodule on image 23. - Left upper lobe 3 mm juxtapleural nodule on image 31. - Right lower lobe 3 mm nodule on image 54. - Left upper lobe lingular 8 mm nodule on image 66. - Left upper lobe lingular 4 mm nodule on image 91. - Additional 2 mm nodules on image 25, 42, 65. PLEURA: No pleural effusion or pneumothorax. HEART: Normal cardiac size and morphology. No coronary artery calcification. No pericardial effusion or thickening. VESSELS: Normal caliber thoracic aorta with no detectable atherosclerotic plaque. Normal caliber main pulmonary artery. MEDIASTINUM AND PAPI: Within normal limits.  CHEST WALL AND LOWER NECK:   Within normal limits. VISUALIZED STRUCTURES IN THE UPPER ABDOMEN: Cholelithiasis without acute cholecystitis. Diffuse hepatic steatosis. OSSEOUS STRUCTURES: No acute fracture or destructive osseous lesion. Mild multilevel spondylosis. Vertebral body height and alignment is maintained. Impression: 1. Multiple pulmonary nodules, the largest measuring 8 mm in the left upper lobe. 2.  Hepatic steatosis. Lung-RADS 4A, suspicious. Three-month followup LDCT; or PET/CT may be used when there is a greater than or equal to 8 mm solid component. The study was marked in EPIC for significant notification, as well as for follow-up. Workstation performed: [id number]     I reviewed the above laboratory and imaging data. Discussion/Summary: 51-year-old woman, very hyperparathyroidism, right-sided target seen on sestamibi scan and ultrasound. She is amenable to and a candidate for minimally invasive right parathyroidectomy, possible further exploration, with intraoperative PTH monitoring. Rationale for this along with risk benefits of surgery discussed. All questions answered and consent signed at this visit.

## 2023-12-04 ENCOUNTER — HOSPITAL ENCOUNTER (OUTPATIENT)
Dept: NUCLEAR MEDICINE | Facility: HOSPITAL | Age: 53
Discharge: HOME | End: 2023-12-04
Payer: COMMERCIAL

## 2023-12-04 DIAGNOSIS — R91.8 MULTIPLE LUNG NODULES: ICD-10-CM

## 2023-12-04 LAB — GLUCOSE SERPL-MCNC: 88 MG/DL (ref 65–140)

## 2023-12-04 PROCEDURE — 78815 PET IMAGE W/CT SKULL-THIGH: CPT

## 2023-12-04 PROCEDURE — A9552 F18 FDG: HCPCS

## 2023-12-04 PROCEDURE — G1004 CDSM NDSC: HCPCS

## 2023-12-04 PROCEDURE — 82948 REAGENT STRIP/BLOOD GLUCOSE: CPT

## 2023-12-05 ENCOUNTER — TELEPHONE (OUTPATIENT)
Age: 53
End: 2023-12-05

## 2023-12-05 DIAGNOSIS — R91.8 MULTIPLE LUNG NODULES: Primary | ICD-10-CM

## 2023-12-05 NOTE — TELEPHONE ENCOUNTER
[facility] from Radiology calling with SIGNIFICANT FINDINGS on PET scan.

## 2023-12-05 NOTE — TELEPHONE ENCOUNTER
12/5/2023 9:25 AM spoke with Eulalia Torre  We discussed her PET results  She agreed to get the follow up CT done in 3 months.     Bruce Watkins DO

## 2023-12-13 ENCOUNTER — PREP FOR PROCEDURE (OUTPATIENT)
Dept: SURGICAL ONCOLOGY | Facility: CLINIC | Age: 53
End: 2023-12-13

## 2023-12-14 ENCOUNTER — PREP FOR PROCEDURE (OUTPATIENT)
Dept: SURGICAL ONCOLOGY | Facility: CLINIC | Age: 53
End: 2023-12-14

## 2023-12-14 ENCOUNTER — TELEPHONE (OUTPATIENT)
Dept: SURGICAL ONCOLOGY | Facility: CLINIC | Age: 53
End: 2023-12-14

## 2023-12-14 NOTE — TELEPHONE ENCOUNTER
Spoke with Corrine Church to confirm surgery move up date of Dec 22, [facility] with Dr. Gayathri Gastelum. Post Op 1/10 [place]. She will need labs and EKG. Chest Xray not needed due to PET CT 12/4.

## 2023-12-15 ENCOUNTER — APPOINTMENT (OUTPATIENT)
Dept: LAB | Facility: HOSPITAL | Age: 53
End: 2023-12-15
Payer: COMMERCIAL

## 2023-12-15 DIAGNOSIS — E21.3 HYPERPARATHYROIDISM (HCC): ICD-10-CM

## 2023-12-15 LAB
ALBUMIN SERPL BCP-MCNC: 4.4 G/DL (ref 3.5–5)
ALP SERPL-CCNC: 83 U/L (ref 34–104)
ALT SERPL W P-5'-P-CCNC: 32 U/L (ref 7–52)
ANION GAP SERPL CALCULATED.3IONS-SCNC: 5 MMOL/L
AST SERPL W P-5'-P-CCNC: 22 U/L (ref 13–39)
BASOPHILS # BLD AUTO: 0.05 THOUSANDS/ÂΜL (ref 0–0.1)
BASOPHILS NFR BLD AUTO: 1 % (ref 0–1)
BILIRUB SERPL-MCNC: 0.76 MG/DL (ref 0.2–1)
BUN SERPL-MCNC: 11 MG/DL (ref 5–25)
CALCIUM SERPL-MCNC: 10.9 MG/DL (ref 8.4–10.2)
CHLORIDE SERPL-SCNC: 106 MMOL/L (ref 96–108)
CO2 SERPL-SCNC: 28 MMOL/L (ref 21–32)
CREAT SERPL-MCNC: 0.68 MG/DL (ref 0.6–1.3)
EOSINOPHIL # BLD AUTO: 0.13 THOUSAND/ÂΜL (ref 0–0.61)
EOSINOPHIL NFR BLD AUTO: 2 % (ref 0–6)
ERYTHROCYTE [DISTWIDTH] IN BLOOD BY AUTOMATED COUNT: 12.6 % (ref 11.6–15.1)
GFR SERPL CREATININE-BSD FRML MDRD: 100 ML/MIN/1.73SQ M
GLUCOSE SERPL-MCNC: 92 MG/DL (ref 65–140)
HCT VFR BLD AUTO: 45.2 % (ref 34.8–46.1)
HGB BLD-MCNC: 15.4 G/DL (ref 11.5–15.4)
IMM GRANULOCYTES # BLD AUTO: 0.02 THOUSAND/UL (ref 0–0.2)
IMM GRANULOCYTES NFR BLD AUTO: 0 % (ref 0–2)
LYMPHOCYTES # BLD AUTO: 2.17 THOUSANDS/ÂΜL (ref 0.6–4.47)
LYMPHOCYTES NFR BLD AUTO: 28 % (ref 14–44)
MCH RBC QN AUTO: 29.2 PG (ref 26.8–34.3)
MCHC RBC AUTO-ENTMCNC: 34.1 G/DL (ref 31.4–37.4)
MCV RBC AUTO: 86 FL (ref 82–98)
MONOCYTES # BLD AUTO: 0.5 THOUSAND/ÂΜL (ref 0.17–1.22)
MONOCYTES NFR BLD AUTO: 6 % (ref 4–12)
NEUTROPHILS # BLD AUTO: 5.01 THOUSANDS/ÂΜL (ref 1.85–7.62)
NEUTS SEG NFR BLD AUTO: 63 % (ref 43–75)
NRBC BLD AUTO-RTO: 0 /100 WBCS
PLATELET # BLD AUTO: 209 THOUSANDS/UL (ref 149–390)
PMV BLD AUTO: 11.2 FL (ref 8.9–12.7)
POTASSIUM SERPL-SCNC: 4.3 MMOL/L (ref 3.5–5.3)
PROT SERPL-MCNC: 6.3 G/DL (ref 6.4–8.4)
RBC # BLD AUTO: 5.28 MILLION/UL (ref 3.81–5.12)
SODIUM SERPL-SCNC: 139 MMOL/L (ref 135–147)
WBC # BLD AUTO: 7.88 THOUSAND/UL (ref 4.31–10.16)

## 2023-12-15 PROCEDURE — 36415 COLL VENOUS BLD VENIPUNCTURE: CPT

## 2023-12-15 PROCEDURE — 80053 COMPREHEN METABOLIC PANEL: CPT

## 2023-12-15 PROCEDURE — 85025 COMPLETE CBC W/AUTO DIFF WBC: CPT

## 2023-12-16 LAB
ATRIAL RATE: 66 BPM
P AXIS: 55 DEGREES
PR INTERVAL: 170 MS
QRS AXIS: 3 DEGREES
QRSD INTERVAL: 72 MS
QT INTERVAL: 406 MS
QTC INTERVAL: 425 MS
T WAVE AXIS: 34 DEGREES
VENTRICULAR RATE: 66 BPM

## 2023-12-20 NOTE — PRE-PROCEDURE INSTRUCTIONS
Pre-Surgery Instructions:   Medication Instructions    pantoprazole (PROTONIX) 40 mg tablet Take day of surgery.   Medication instructions for day surgery reviewed. Please use only a sip of water to take your instructed medications. Avoid all over the counter vitamins, supplements and NSAIDS for one week prior to surgery per anesthesia guidelines. Tylenol is ok to take as needed.     You will receive a call one business day prior to surgery with an arrival time and hospital directions. If your surgery is scheduled on a Monday, the hospital will be calling you on the Friday prior to your surgery. If you have not heard from anyone by 8pm, please call the hospital supervisor through the hospital  at [phone]. ([place] [phone]).    Do not eat or drink anything after midnight the night before your surgery, including candy, mints, lifesavers, or chewing gum. Do not drink alcohol 24hrs before your surgery. Try not to smoke at least 24hrs before your surgery.       Follow the pre surgery showering instructions as listed in the “My Surgical Experience Booklet” or otherwise provided by your surgeon's office. Do not use a blade to shave the surgical area 1 week before surgery. It is okay to use a clean electric clippers up to 24 hours before surgery. Do not apply any lotions, creams, including makeup, cologne, deodorant, or perfumes after showering on the day of your surgery. Do not use dry shampoo, hair spray, hair gel, or any type of hair products.     No contact lenses, eye make-up, or artificial eyelashes. Remove nail polish, including gel polish, and any artificial, gel, or acrylic nails if possible. Remove all jewelry including rings and body piercing jewelry.     Wear causal clothing that is easy to take on and off. Consider your type of surgery.    Keep any valuables, jewelry, piercings at home. Please bring any specially ordered equipment (sling, braces) if indicated.    Arrange for a responsible  person to drive you to and from the hospital on the day of your surgery. Visitor Guidelines discussed.     Call the surgeon's office with any new illnesses, exposures, or additional questions prior to surgery.    Please reference your “My Surgical Experience Booklet” for additional information to prepare for your upcoming surgery.

## 2023-12-22 ENCOUNTER — ANESTHESIA EVENT (OUTPATIENT)
Dept: PERIOP | Facility: HOSPITAL | Age: 53
End: 2023-12-22
Payer: COMMERCIAL

## 2023-12-22 ENCOUNTER — ANESTHESIA (OUTPATIENT)
Dept: PERIOP | Facility: HOSPITAL | Age: 53
End: 2023-12-22
Payer: COMMERCIAL

## 2023-12-22 ENCOUNTER — HOSPITAL ENCOUNTER (OUTPATIENT)
Facility: HOSPITAL | Age: 53
Setting detail: OUTPATIENT SURGERY
Discharge: HOME | End: 2023-12-22
Attending: SURGERY | Admitting: SURGERY
Payer: COMMERCIAL

## 2023-12-22 VITALS
DIASTOLIC BLOOD PRESSURE: 71 MMHG | TEMPERATURE: 97.8 F | SYSTOLIC BLOOD PRESSURE: 137 MMHG | BODY MASS INDEX: 37.22 KG/M2 | OXYGEN SATURATION: 98 % | WEIGHT: 218 LBS | HEART RATE: 75 BPM | RESPIRATION RATE: 18 BRPM | HEIGHT: 64 IN

## 2023-12-22 DIAGNOSIS — E21.3 HYPERPARATHYROIDISM (HCC): ICD-10-CM

## 2023-12-22 DIAGNOSIS — E83.52 HYPERCALCEMIA: ICD-10-CM

## 2023-12-22 LAB
CALCIUM SERPL-MCNC: 10.3 MG/DL (ref 8.4–10.2)
EXT PREGNANCY TEST URINE: NEGATIVE
EXT. CONTROL: NORMAL
PTH-INTACT P EXCISION SERPL-MCNC: 168.5 PG/ML (ref 12–88)
PTH-INTACT P EXCISION SERPL-MCNC: 48.1 PG/ML (ref 12–88)
PTH-INTACT P EXCISION SERPL-MCNC: 60.5 PG/ML (ref 12–88)
PTH-INTACT P EXCISION SERPL-MCNC: 92.6 PG/ML (ref 12–88)

## 2023-12-22 PROCEDURE — 83970 ASSAY OF PARATHORMONE: CPT | Performed by: SURGERY

## 2023-12-22 PROCEDURE — 88331 PATH CONSLTJ SURG 1 BLK 1SPC: CPT | Performed by: PATHOLOGY

## 2023-12-22 PROCEDURE — 88305 TISSUE EXAM BY PATHOLOGIST: CPT | Performed by: PATHOLOGY

## 2023-12-22 PROCEDURE — 82310 ASSAY OF CALCIUM: CPT | Performed by: STUDENT IN AN ORGANIZED HEALTH CARE EDUCATION/TRAINING PROGRAM

## 2023-12-22 PROCEDURE — 60500 EXPLORE PARATHYROID GLANDS: CPT | Performed by: SURGERY

## 2023-12-22 RX ORDER — SODIUM CHLORIDE, SODIUM LACTATE, POTASSIUM CHLORIDE, CALCIUM CHLORIDE 600; 310; 30; 20 MG/100ML; MG/100ML; MG/100ML; MG/100ML
INJECTION, SOLUTION INTRAVENOUS CONTINUOUS PRN
Status: DISCONTINUED | OUTPATIENT
Start: 2023-12-22 | End: 2023-12-22

## 2023-12-22 RX ORDER — ONDANSETRON 2 MG/ML
4 INJECTION INTRAMUSCULAR; INTRAVENOUS ONCE AS NEEDED
Status: DISCONTINUED | OUTPATIENT
Start: 2023-12-22 | End: 2023-12-22 | Stop reason: HOSPADM

## 2023-12-22 RX ORDER — PROPOFOL 10 MG/ML
INJECTION, EMULSION INTRAVENOUS AS NEEDED
Status: DISCONTINUED | OUTPATIENT
Start: 2023-12-22 | End: 2023-12-22

## 2023-12-22 RX ORDER — BUPIVACAINE HYDROCHLORIDE 2.5 MG/ML
INJECTION, SOLUTION EPIDURAL; INFILTRATION; INTRACAUDAL AS NEEDED
Status: DISCONTINUED | OUTPATIENT
Start: 2023-12-22 | End: 2023-12-22 | Stop reason: HOSPADM

## 2023-12-22 RX ORDER — DEXAMETHASONE SODIUM PHOSPHATE 10 MG/ML
INJECTION, SOLUTION INTRAMUSCULAR; INTRAVENOUS AS NEEDED
Status: DISCONTINUED | OUTPATIENT
Start: 2023-12-22 | End: 2023-12-22

## 2023-12-22 RX ORDER — ONDANSETRON 2 MG/ML
INJECTION INTRAMUSCULAR; INTRAVENOUS AS NEEDED
Status: DISCONTINUED | OUTPATIENT
Start: 2023-12-22 | End: 2023-12-22

## 2023-12-22 RX ORDER — HYDROMORPHONE HCL/PF 1 MG/ML
0.2 SYRINGE (ML) INJECTION
Status: DISCONTINUED | OUTPATIENT
Start: 2023-12-22 | End: 2023-12-22 | Stop reason: HOSPADM

## 2023-12-22 RX ORDER — MIDAZOLAM HYDROCHLORIDE 2 MG/2ML
INJECTION, SOLUTION INTRAMUSCULAR; INTRAVENOUS AS NEEDED
Status: DISCONTINUED | OUTPATIENT
Start: 2023-12-22 | End: 2023-12-22

## 2023-12-22 RX ORDER — HYDROMORPHONE HCL IN WATER/PF 6 MG/30 ML
0.2 PATIENT CONTROLLED ANALGESIA SYRINGE INTRAVENOUS
Status: DISCONTINUED | OUTPATIENT
Start: 2023-12-22 | End: 2023-12-22 | Stop reason: HOSPADM

## 2023-12-22 RX ORDER — ONDANSETRON 2 MG/ML
4 INJECTION INTRAMUSCULAR; INTRAVENOUS EVERY 4 HOURS PRN
Status: DISCONTINUED | OUTPATIENT
Start: 2023-12-22 | End: 2023-12-22 | Stop reason: HOSPADM

## 2023-12-22 RX ORDER — MAGNESIUM HYDROXIDE 1200 MG/15ML
LIQUID ORAL AS NEEDED
Status: DISCONTINUED | OUTPATIENT
Start: 2023-12-22 | End: 2023-12-22 | Stop reason: HOSPADM

## 2023-12-22 RX ORDER — LIDOCAINE HYDROCHLORIDE 10 MG/ML
INJECTION, SOLUTION EPIDURAL; INFILTRATION; INTRACAUDAL; PERINEURAL AS NEEDED
Status: DISCONTINUED | OUTPATIENT
Start: 2023-12-22 | End: 2023-12-22

## 2023-12-22 RX ORDER — FENTANYL CITRATE 50 UG/ML
INJECTION, SOLUTION INTRAMUSCULAR; INTRAVENOUS AS NEEDED
Status: DISCONTINUED | OUTPATIENT
Start: 2023-12-22 | End: 2023-12-22

## 2023-12-22 RX ORDER — SUCCINYLCHOLINE/SOD CL,ISO/PF 100 MG/5ML
SYRINGE (ML) INTRAVENOUS AS NEEDED
Status: DISCONTINUED | OUTPATIENT
Start: 2023-12-22 | End: 2023-12-22

## 2023-12-22 RX ORDER — ONDANSETRON 2 MG/ML
INJECTION INTRAMUSCULAR; INTRAVENOUS
Status: COMPLETED
Start: 2023-12-22 | End: 2023-12-22

## 2023-12-22 RX ORDER — ROCURONIUM BROMIDE 10 MG/ML
INJECTION, SOLUTION INTRAVENOUS AS NEEDED
Status: DISCONTINUED | OUTPATIENT
Start: 2023-12-22 | End: 2023-12-22

## 2023-12-22 RX ORDER — FENTANYL CITRATE/PF 50 MCG/ML
25 SYRINGE (ML) INJECTION
Status: DISCONTINUED | OUTPATIENT
Start: 2023-12-22 | End: 2023-12-22 | Stop reason: HOSPADM

## 2023-12-22 RX ADMIN — FENTANYL CITRATE 75 MCG: 50 INJECTION INTRAMUSCULAR; INTRAVENOUS at 13:14

## 2023-12-22 RX ADMIN — LIDOCAINE HYDROCHLORIDE 5 MG: 10 INJECTION, SOLUTION EPIDURAL; INFILTRATION; INTRACAUDAL; PERINEURAL at 13:14

## 2023-12-22 RX ADMIN — Medication 100 MG: at 13:15

## 2023-12-22 RX ADMIN — SODIUM CHLORIDE, SODIUM LACTATE, POTASSIUM CHLORIDE, AND CALCIUM CHLORIDE: .6; .31; .03; .02 INJECTION, SOLUTION INTRAVENOUS at 13:07

## 2023-12-22 RX ADMIN — DEXAMETHASONE SODIUM PHOSPHATE 10 MG: 10 INJECTION, SOLUTION INTRAMUSCULAR; INTRAVENOUS at 13:43

## 2023-12-22 RX ADMIN — ONDANSETRON 4 MG: 2 INJECTION INTRAMUSCULAR; INTRAVENOUS at 13:40

## 2023-12-22 RX ADMIN — SUGAMMADEX 200 MG: 100 INJECTION, SOLUTION INTRAVENOUS at 15:18

## 2023-12-22 RX ADMIN — ROCURONIUM BROMIDE 45 MG: 10 INJECTION, SOLUTION INTRAVENOUS at 13:32

## 2023-12-22 RX ADMIN — ROCURONIUM BROMIDE 10 MG: 10 INJECTION, SOLUTION INTRAVENOUS at 14:40

## 2023-12-22 RX ADMIN — FENTANYL CITRATE 50 MCG: 50 INJECTION INTRAMUSCULAR; INTRAVENOUS at 14:50

## 2023-12-22 RX ADMIN — ONDANSETRON 4 MG: 2 INJECTION INTRAMUSCULAR; INTRAVENOUS at 16:43

## 2023-12-22 RX ADMIN — PROPOFOL 150 MG: 10 INJECTION, EMULSION INTRAVENOUS at 13:14

## 2023-12-22 RX ADMIN — FENTANYL CITRATE 25 MCG: 50 INJECTION INTRAMUSCULAR; INTRAVENOUS at 13:11

## 2023-12-22 RX ADMIN — MIDAZOLAM 2 MG: 1 INJECTION INTRAMUSCULAR; INTRAVENOUS at 13:09

## 2023-12-22 RX ADMIN — ROCURONIUM BROMIDE 5 MG: 10 INJECTION, SOLUTION INTRAVENOUS at 13:15

## 2023-12-22 RX ADMIN — HYDROMORPHONE HYDROCHLORIDE 0.2 MG: 1 INJECTION, SOLUTION INTRAMUSCULAR; INTRAVENOUS; SUBCUTANEOUS at 16:56

## 2023-12-22 RX ADMIN — FENTANYL CITRATE 50 MCG: 50 INJECTION INTRAMUSCULAR; INTRAVENOUS at 14:00

## 2023-12-22 NOTE — OP NOTE
OPERATIVE REPORT  PATIENT NAME: Ritu Rodriguez    :  1970  MRN: [id number]  Pt Location: [facility] 03    SURGERY DATE: 2023    Surgeons and Role:     * Geraldo Kenyon MD - Primary     * Jocelynn Medina DO - Assisting    Preop Diagnosis:  Hyperparathyroidism (HCC) [E21.3]  Hypercalcemia [E83.52]    Post-Op Diagnosis Codes:     * Hyperparathyroidism (HCC) [E21.3]     * Hypercalcemia [E83.52]    Procedure(s):  Right - MINIMALLY INVASIVE RIGHT PARATHYROIDECTOMY. POSSIBLE 4 GLAND EXPLORATION  MONITORING INTRAOPERATIVE PTH (PARATHYROID HORMONE)    Specimen(s):  ID Type Source Tests Collected by Time Destination   1 : RIGHT PARATHYROID Tissue Parathyroid TISSUE EXAM Geraldo Kenyon MD 2023 1403        Estimated Blood Loss:   50 mL    Drains:  * No LDAs found *    Anesthesia Type:   General    Operative Indications:  Hyperparathyroidism (HCC) [E21.3]  Hypercalcemia [E83.52]      Operative Findings:  1500 mg right superior parathyroid adenoma    Complications:   None    Procedure and Technique:  The patient was brought to the OR and identified by proper time-out. Following this she was intubated by the anesthesia team, then was prepped and draped with the neck exposed in the extended position. Local anesthesia was given, then a 3 cm incision was made sharply 2 finger breaths above the sternal notch. Cautery was used to dissect through the dermis subcutaneous tissue. Wheatlanner retractor was placed. The platysma was then transected with cautery. Strap muscles were then divided the midline. This exposed the surface of the thyroid. We dissected the strap muscles off the anterolateral aspect of the right thyroid lobe. This enabled us to dissect between the thyroid and the strap muscles. The thyroid gland was then retracted medially and anteriorly which exposed what appeared to be a large parathyroid gland along the midposterior thyroid gland. This was dissected out from surrounding tissue in  hemostatic fashion with cautery. Vascular pedicle was visualized. The pedicle was clipped. PTH levels were drawn at the start of the case, 0 min, 5 min, and 10 min after the case. Levels decreased appropriately to normal range upon 10 min post resection of the gland. We then irrigated the field and closed using 3-0 Vicryl to close the strap muscles the midline followed by 3-0 Vicryl to close the platysma, followed by 4-0 Vicryl close the dermis, followed by 5-0 Monocryl to close skin in subcuticular fashion. Benzoin and Steri-Strips were then applied in the usual fashion. The patient was awakened transferred to the recovery room in stable condition.   I was present for the entire procedure.    Patient Disposition:  PACU         SIGNATURE: Geraldo Kenyon MD  DATE: December 22, 2023  TIME: 3:16 PM

## 2023-12-22 NOTE — DISCHARGE INSTR - AVS FIRST PAGE
POST-OPERATIVE WOUND CARE INSTRUCTIONS     Your wound is closed with:              Dissolvable sutures/steri strips                   Wound care:              Let the steri strips fall off on their own. Do not submerge your incision in water for 2 weeks. You can shower after 24 hours.     Activity:              Did not perform any heavy lifting or strenuous physical activity for 7 days.              Your activity restrictions will be reevaluated at your postoperative visit.     Medications:              You may resume all your preoperative medications and diet.              Pain medication as directed on the prescription given in the office.     Other:              May use ice on the wound for 24-48 hours as needed for comfort.                        Call the office at [phone] if you have any of the followin. Redness, swelling, heat, unusual drainage or heavy bleeding from the wound.              2. Fever greater than 101°F.              3. Pain not relieved by the prescribed pain medication

## 2023-12-22 NOTE — ANESTHESIA PROCEDURE NOTES
"Arterial Line Insertion    Performed by: Darrick Mathur MD  Authorized by: Darrick Mathur MD  Consent: Verbal consent obtained. Written consent obtained.  Risks and benefits: risks, benefits and alternatives were discussed  Consent given by: patient  Patient identity confirmed: arm band and provided demographic data  Time out: Immediately prior to procedure a \"time out\" was called to verify the correct patient, procedure, equipment, support staff and site/side marked as required.  Preparation: Patient was prepped and draped in the usual sterile fashion.  Indications: multiple ABGs and hemodynamic monitoring  Orientation:  Right  Location: radial artery  Sedation:  Patient sedated: yes    Procedure Details:  Needle gauge: 20  Number of attempts: 1    Post-procedure:  Post-procedure: dressing applied  Waveform: good waveform  Patient tolerance: Patient tolerated the procedure well with no immediate complications          "

## 2023-12-22 NOTE — ANESTHESIA PREPROCEDURE EVALUATION
Procedure:  MINIMALLY INVASIVE RIGHT PARATHYROIDECTOMY, POSSIBLE 4 GLAND EXPLORATION (Right: Neck)  MONITORING INTRAOPERATIVE PTH (PARATHYROID HORMONE) (Neck)    Relevant Problems   ENDO   (+) Hyperparathyroidism (HCC)      GI/HEPATIC   (+) Fatty liver   (+) Gastroesophageal reflux disease   (+) Hiatal hernia      MUSCULOSKELETAL   (+) Hiatal hernia        Physical Exam    Airway    Mallampati score: II  TM Distance: >3 FB  Neck ROM: full     Dental   No notable dental hx     Cardiovascular  Rhythm: regular, Rate: normal    Pulmonary   Breath sounds clear to auscultation    Other Findings  post-pubertal.      Anesthesia Plan  ASA Score- 2     Anesthesia Type- general with ASA Monitors.         Additional Monitors: arterial line.    Airway Plan: ETT.           Plan Factors-Exercise tolerance (METS): >4 METS.    Chart reviewed. EKG reviewed.  Existing labs reviewed. Patient summary reviewed.    Patient is not a current smoker.      Obstructive sleep apnea risk education given perioperatively.        Induction- intravenous.    Postoperative Plan- Plan for postoperative opioid use.     Informed Consent- Anesthetic plan and risks discussed with patient.  I personally reviewed this patient with the CRNA. Discussed and agreed on the Anesthesia Plan with the CRNA..

## 2023-12-22 NOTE — INTERVAL H&P NOTE
H&P reviewed. After examining the patient I find no changes in the patients condition since the H&P had been written.    Vitals:    12/22/23 1203   BP: 132/82   Pulse: 87   Resp: 18   Temp: 97.8 °F (36.6 °C)   SpO2: 96%

## 2023-12-22 NOTE — ANESTHESIA POSTPROCEDURE EVALUATION
Post-Op Assessment Note    CV Status:  Stable  Pain Score: 0    Pain management: adequate       Mental Status:  Sleepy   Hydration Status:  Stable   PONV Controlled:  None   Airway Patency:  Patent     Post Op Vitals Reviewed: Yes      Staff: CRNA               BP   146/94   Temp   37   Pulse  86   Resp   15   SpO2   100

## 2023-12-24 ENCOUNTER — NURSE TRIAGE (OUTPATIENT)
Dept: OTHER | Facility: OTHER | Age: 53
End: 2023-12-24

## 2023-12-24 NOTE — TELEPHONE ENCOUNTER
Reason for Disposition  • Affirmative: Did you page the on call provider?    Protocols used: [facility] NO TRIAGE REQUIRED

## 2023-12-24 NOTE — TELEPHONE ENCOUNTER
Regarding: Post surgery incision redness, burning and hot to touch  ----- Message from Rajwinder Mansfield sent at 12/24/2023  3:10 PM EST -----  Pt calling stating she had surgery on Friday and now where the incision is she is experiencing redness, burning and it is hot to the touch.

## 2023-12-26 ENCOUNTER — TELEPHONE (OUTPATIENT)
Dept: SURGICAL ONCOLOGY | Facility: CLINIC | Age: 53
End: 2023-12-26

## 2023-12-26 NOTE — TELEPHONE ENCOUNTER
Called patient to check on her. She states that redness, swelling and pain have subsided. She used ice off and on and it helped. She states she is feeling well overall. We will see her for post op on 1/10 and she agrees to call my teams number if she needs anything before then.

## 2023-12-28 PROCEDURE — 88305 TISSUE EXAM BY PATHOLOGIST: CPT | Performed by: PATHOLOGY

## 2024-01-03 ENCOUNTER — TELEPHONE (OUTPATIENT)
Dept: PULMONOLOGY | Facility: CLINIC | Age: 54
End: 2024-01-03

## 2024-01-05 ENCOUNTER — TELEPHONE (OUTPATIENT)
Dept: HEMATOLOGY ONCOLOGY | Facility: CLINIC | Age: 54
End: 2024-01-05

## 2024-01-05 NOTE — TELEPHONE ENCOUNTER
Spoke to patient, she denies any numbness or tingling, states she feels fine except for her hip pain. She is already set up to see her PCP next week. She verbalized understanding that this is not the usual symptoms of low calcium.

## 2024-01-05 NOTE — TELEPHONE ENCOUNTER
Patient Call    Who are you speaking with? Patient    If it is not the patient, are they listed on an active communication consent form? N/A   What is the reason for this call? She is having right hip and thigh pain for about 4 days now and hard to walk at sometimes and states the pain level is around a 10 and unsure if she needs repeat blood work   Does this require a call back? Yes   If a call back is required, please list best call back number [phone]   If a call back is required, advise that a message will be forwarded to their care team and someone will return their call as soon as possible.   Did you relay this information to the patient? Yes

## 2024-01-08 ENCOUNTER — HOSPITAL ENCOUNTER (OUTPATIENT)
Dept: RADIOLOGY | Facility: HOSPITAL | Age: 54
Discharge: HOME | End: 2024-01-08
Payer: COMMERCIAL

## 2024-01-08 ENCOUNTER — OFFICE VISIT (OUTPATIENT)
Dept: OBGYN CLINIC | Facility: CLINIC | Age: 54
End: 2024-01-08
Payer: COMMERCIAL

## 2024-01-08 VITALS — WEIGHT: 215 LBS | OXYGEN SATURATION: 98 % | HEART RATE: 82 BPM | BODY MASS INDEX: 36.7 KG/M2 | HEIGHT: 64 IN

## 2024-01-08 DIAGNOSIS — M25.551 PAIN IN RIGHT HIP: ICD-10-CM

## 2024-01-08 DIAGNOSIS — M25.551 PAIN IN RIGHT HIP: Primary | ICD-10-CM

## 2024-01-08 DIAGNOSIS — M16.10 HIP ARTHRITIS: ICD-10-CM

## 2024-01-08 PROCEDURE — 72100 X-RAY EXAM L-S SPINE 2/3 VWS: CPT

## 2024-01-08 PROCEDURE — 73502 X-RAY EXAM HIP UNI 2-3 VIEWS: CPT

## 2024-01-08 PROCEDURE — 99203 OFFICE O/P NEW LOW 30 MIN: CPT | Performed by: PHYSICIAN ASSISTANT

## 2024-01-08 RX ORDER — METHYLPREDNISOLONE 4 MG/1
TABLET ORAL
Qty: 1 EACH | Refills: 0 | Status: SHIPPED | OUTPATIENT
Start: 2024-01-08

## 2024-01-08 NOTE — PROGRESS NOTES
Pulmonary Initial Visit  Ritu Rodriguez 53 y.o. female MRN: [id number]  @ Encounter: [id number]      Impressions/Recommendations:     Multiple Lung nodules:  - 11/15 CT lung screening: RUL 3 mm nodule, EMMA 3 mm juxtapleural nodule, RLL 3 mm nodule, EMMA lingular 8 mm nodule, EMMA lingular 4 mm nodule, and additional 2 mm nodules (7 in total).  - 12/4 PET scan: largest nodule of 8 mm decreased to 5 mm, other nodules no hypermetabolic malignancy.   - Repeat CT lung nodule ordered for 1 year, instead of 3 months as recommended by radiology report, as patient's lung nodule decreased and symptoms minimal at this time.   - Advised patient to increase exercise to help with exercise tolerance and occassional SOB with heavy exertion.   - Patient may follow up in 3-4 months or sooner as necessary. Can consider PFTs at next visit.     Orders Placed This Encounter   Procedures    CT lung nodule follow-up     Standing Status:   Future     Standing Expiration Date:   1/9/2028     Scheduling Instructions:      There is no prep for this study. Please bring your insurance cards, a form of photo ID and a list of your medications with you. Arrive 15 minutes prior to your appointment time to register. On the day of your test, please bring any prior CT or MRI studies of this area with you that were not performed at a [facility].            To schedule this appointment, please contact Central Scheduling at [phone].                 Order Specific Question:   Is the patient pregnant?     Answer:   No     Order Specific Question:   What is the patient's sedation requirement? If Medication for Claustrophobia is selected, order medication at this point.     Answer:   No Sedation     Order Specific Question:   [facility]     Answer:   Immediate     Order Specific Question:   Reason for Exam (FREE TEXT)     Answer:   follow up multiple lung nodules     Order Specific Question:   When should the test be  "performed?     Answer:   Other/Specific Date     Order Specific Question:   If \"other\", please specify:     Answer:   1 year from 11/15 CT lung scan       History of Present Illness   HPI:  Ritu Rodriguez is a 53 y.o. female with pmhx sig for GERD, hiatal hernia, hyperparathyroidism, and Alvares's esophagus without dysplasia that presents to the office for initial consult on multiple lung nodules. CT lung nodule follow up was ordered on 12/5 by PCP Dr. Moy. On 11/15, CT chest lung cancer without IV contrast screening was performed on 11/15 and showed multiple lung nodules with the largest measuring 8 mm in the left upper lobe.    PET/CT scan on 12/4 showed: No focal FDG activity characteristic of hypermetabolic malignancy. Specifically, patient's dominant 8 mm lingular nodule has decreased in size currently measuring 5 mm and is non-FDG avid but beyond the limits of resolution for PET/CT. Patient's known additional tiny scattered bilateral lung nodules were better characterized on recent dedicated CT of chest and are beyond the limits of resolution for PET/CT. Short interval follow-up with CT of chest in 3 months (in March 2024) is recommended to ensure stability and exclude developing malignancy of low metabolic activity.    Patient has a history of smoking for >20 years but quit about 20 years ago. She has a family history of lung cancer in her mother, father, and maternal aunt. Her sister gets yearly screening CT scans. Social smoking in past for patient so about 0.25 ppd or less. Social alcohol currently. No other smoking, such as marijuana.    Mother had a RUL lobectomy 2 years ago (8 mm up to 25 mm), not on any radiation or chemotherapy. Father and maternal aunt passed away from lung cancer. Sister had routine screening and found to have CLL. Mother has CLL as well. Lung cancer in family is adenocarcinoma according to the patient.     No breast cancer in family, father had prostate cancer, patient had " precancerous polyps in colon which were removed 6 years ago and has had 2 colonoscopies since then.     In October 2023, patient started having discomfort in chest/epigastric region. Calcium was elevated and repeated multiple days. PTH was elevated. Endocrinology ordered scans for parathyroid and on 12/22, one parathyroid gland on right removed. Felt better in terms of acid reflux after surgery.    Currently, patient does not have a cough, SOB, fever, chills, or chest tightness/discomfort.     Had cough for a couple weeks years ago when had COVID. Had pneumonia in early 2000s, wasn't hospitalized, and was on antibiotics, right sided pneumonia. Around that time, received pneumo vaccine.     Vaccines- primary covid, 2 boosters covid, annual flu, hasn't had pnx vaccine since early 2000s, will hold off for now    EKG prior to recent sx unremarkable. Sometimes has SOB when going up hills but with heavy activity. Muscle cramping in thighs, saw orthopedics yesterday. Pain in right hip after surgery. Ortho thought R hip was synovitis and muscle cramps related to high calcium. Oral steroid started yesterday.     Review of systems:  Review of systems was completed and was otherwise negative except as listed in HPI.    Historical Information   Past Medical History:   Diagnosis Date    Colon polyp     Concussion with no loss of consciousness 10/02/2015    resolved 06/09/2017    GERD (gastroesophageal reflux disease)     Hypercalcemia     Hyperparathyroidism (HCC)      Past Surgical History:   Procedure Laterality Date    CHG ASSAY OF PARATHORMONE N/A 12/22/2023    Procedure: MONITORING INTRAOPERATIVE PTH (PARATHYROID HORMONE);  Surgeon: Geraldo Kenyon MD;  [facility];  Service: Surgical Oncology    COLONOSCOPY      NO PAST SURGERIES      DC COLONOSCOPY FLX DX W/COLLJ SPEC WHEN PFRMD N/A 04/06/2018    Procedure: EGD AND COLONOSCOPY;  Surgeon: Ottoniel Palumbo MD;  [facility];  Service: Gastroenterology     TN COLONOSCOPY FLX DX W/COLLJ SPEC WHEN PFRMD N/A 2018    Procedure: COLONOSCOPY;  Surgeon: Ottoniel Palumbo MD;  [facility];  Service: Gastroenterology    TN PARATHYROIDECTOMY/EXPLORATION PARATHYROIDS Right 2023    Procedure: MINIMALLY INVASIVE RIGHT PARATHYROIDECTOMY, POSSIBLE 4 GLAND EXPLORATION;  Surgeon: Geraldo Kenyon MD;  [facility];  Service: Surgical Oncology    UPPER GASTROINTESTINAL ENDOSCOPY       Family History   Problem Relation Age of Onset    Lung cancer Mother     Hypertension Father     Lung cancer Father     Prostate cancer Father 75    No Known Problems Sister     No Known Problems Sister     No Known Problems Sister     No Known Problems Daughter     No Known Problems Brother     No Known Problems Brother     No Known Problems Son     No Known Problems Son     Lung cancer Maternal Aunt     Colon cancer Paternal Aunt 68       Social History     Socioeconomic History    Marital status: /Civil Union     Spouse name: None    Number of children: None    Years of education: None    Highest education level: None   Occupational History    None   Tobacco Use    Smoking status: Former     Current packs/day: 0.00     Types: Cigarettes     Start date:      Quit date:      Years since quittin.0    Smokeless tobacco: Never   Vaping Use    Vaping status: Never Used   Substance and Sexual Activity    Alcohol use: Yes     Comment: social    Drug use: Never    Sexual activity: Yes     Partners: Male   Other Topics Concern    None   Social History Narrative    None     Social Determinants of Health     Financial Resource Strain: Not on file   Food Insecurity: Not on file   Transportation Needs: Not on file   Physical Activity: Not on file   Stress: Not on file   Social Connections: Not on file   Intimate Partner Violence: Not on file   Housing Stability: Not on file       Meds/Allergies   No current facility-administered medications for this visit.      (Not in a hospital admission)    No Known Allergies    Vitals: Blood pressure 124/82, pulse 84, temperature 98.1 °F (36.7 °C), temperature source Tympanic, last menstrual period 04/04/2022, SpO2 98%, not currently breastfeeding., no O2, There is no height or weight on file to calculate BMI.    Physical Exam  General: Awake alert and oriented x 3, conversant without conversational dyspnea, NAD, normal affect  HEENT:  PERRL, Sclera noninjected, nonicteric OU, Nares patent, no nasal flaring, no nasal drainage, Mucous membranes, moist, no oral lesions, normal dentition  NECK: Trachea midline, no accessory muscle use, no stridor, no cervical or supraclavicular adenopathy, JVP not elevated  CARDIAC: Reg, single s1/S2, no m/r/g  PULM: clear and equal bilaterally, no wheezing, no crackles  CHEST: No gross deformities, equal chest expansion on inspiration bilaterally  ABD: Normoactive bowel sounds, soft nontender, nondistended, no rebound, no rigidity, no guarding  : no abdominal tenderness  EXT: No cyanosis, no clubbing, no edema, normal capillary refill  SKIN:  No rashes, no lesions  NEURO: no focal neurologic deficits, AAOx3, moving all extremities appropriately    Labs: I have personally reviewed pertinent lab results.  Lab Results   Component Value Date    SODIUM 139 12/15/2023    K 4.3 12/15/2023     12/15/2023    CO2 28 12/15/2023    BUN 11 12/15/2023    CREATININE 0.68 12/15/2023    GLUC 92 12/15/2023    CALCIUM 10.3 (H) 12/22/2023     Lab Results   Component Value Date    WBC 7.88 12/15/2023    HGB 15.4 12/15/2023    HCT 45.2 12/15/2023    MCV 86 12/15/2023     12/15/2023     Imaging and other studies: I have personally reviewed pertinent reports.   and I have personally reviewed pertinent films in PACS  PET-CT scan 12/4/2023:  No focal FDG activity characteristic of hypermetabolic malignancy.  Specifically, patient's dominant 8 mm lingular nodule has decreased in size currently measuring 5 mm and  is non-FDG avid but beyond the limits of resolution for PET/CT.  Patient's known additional tiny scattered bilateral lung nodules were better characterized on recent dedicated CT of chest and are beyond the limits of resolution for PET/CT.  Short interval follow-up with CT of chest in 3 months is recommended to ensure stability and exclude developing malignancy of low metabolic activity.     Pulmonary function testing:    No pulmonary function testing available for review.    Echocardiogram:   No echocardiogram available for review.      EKG, Pathology, and Other Studies: I have personally reviewed pertinent reports.    ECG 12/15/2023:  Normal sinus rhythm  Low voltage QRS  Borderline ECG    Referring:  Ashlee Moy DO  [address] [facility]   [address]  [place] [address]    Katharine Olguin DO  [facility]

## 2024-01-08 NOTE — PROGRESS NOTES
Assessment/Plan   Diagnoses and all orders for this visit:    Pain in right hip    Hip arthritis  - She requests a trial of an oral steroid.  I think this is a reasonable option - will send an rx for a medrol dosepak to the pharmacy  - Start PT  - Follow up with Dr. Bagley or Dr. Garcia for consideration of an US guided cortisone injection  - Call immediately with any fevers, chills, or new/worsening symptoms        Subjective   Patient ID: Ritu Rodriguez is a 53 y.o. female.    Vitals:    01/08/24 1447   Pulse: 82   SpO2: 98%     54yo female comes in for an evaluation of her right hip.  She has been having pain for about 3 weeks with no specific injury.  At first, the pain was located in the lateral hip.  Now, it radiates to the groin and anterior thigh.  The pain increases with hip flexion.  She is more comfortable standing than sitting.  No fevers or chills.  There was no injury.  The pain started after she was doing a lot of resting after parathyroid surgery.  The pain is dull in character, mild in severity, pain does not radiate and is not associated with numbness.  She is taking Motrin 600.          The following portions of the patient's history were reviewed and updated as appropriate: allergies, current medications, past family history, past medical history, past social history, past surgical history, and problem list.    Review of Systems  Ortho Exam  Past Medical History:   Diagnosis Date    Colon polyp     Concussion with no loss of consciousness 10/02/2015    resolved 06/09/2017    GERD (gastroesophageal reflux disease)     Hypercalcemia     Hyperparathyroidism (HCC)      Past Surgical History:   Procedure Laterality Date    CHG ASSAY OF PARATHORMONE N/A 12/22/2023    Procedure: MONITORING INTRAOPERATIVE PTH (PARATHYROID HORMONE);  Surgeon: Geraldo Kenyon MD;  [facility];  Service: Surgical Oncology    COLONOSCOPY      NO PAST SURGERIES      LA COLONOSCOPY FLX DX W/COLLJ SPEC WHEN PFRMD N/A  2018    Procedure: EGD AND COLONOSCOPY;  Surgeon: Ottoniel Palumbo MD;  [facility];  Service: Gastroenterology    SD COLONOSCOPY FLX DX W/COLLJ SPEC WHEN PFRMD N/A 2018    Procedure: COLONOSCOPY;  Surgeon: Ottoniel Palumbo MD;  [facility];  Service: Gastroenterology    SD PARATHYROIDECTOMY/EXPLORATION PARATHYROIDS Right 2023    Procedure: MINIMALLY INVASIVE RIGHT PARATHYROIDECTOMY, POSSIBLE 4 GLAND EXPLORATION;  Surgeon: Geraldo Kenyon MD;  [facility];  Service: Surgical Oncology    UPPER GASTROINTESTINAL ENDOSCOPY       Family History   Problem Relation Age of Onset    Lung cancer Mother     Hypertension Father     Lung cancer Father     Prostate cancer Father 75    No Known Problems Sister     No Known Problems Sister     No Known Problems Sister     No Known Problems Daughter     No Known Problems Brother     No Known Problems Brother     No Known Problems Son     No Known Problems Son     Lung cancer Maternal Aunt     Colon cancer Paternal Aunt 68     Social History     Occupational History    Not on file   Tobacco Use    Smoking status: Former     Current packs/day: 0.00     Types: Cigarettes     Start date:      Quit date:      Years since quittin.0    Smokeless tobacco: Never   Vaping Use    Vaping status: Never Used   Substance and Sexual Activity    Alcohol use: Yes     Comment: social    Drug use: Never    Sexual activity: Yes     Partners: Male       Review of Systems   Constitutional: Negative.    HENT: Negative.    Eyes: Negative.    Respiratory: Negative.    Cardiovascular: Negative.    Gastrointestinal: Negative.    Endocrine: Negative.    Genitourinary: Negative.    Musculoskeletal: As below..   Allergic/Immunologic: Negative.    Neurological: Negative.    Hematological: Negative.    Psychiatric/Behavioral: Negative.        Objective   Physical Exam      I have personally reviewed pertinent films in PACS and my interpretation is No  acute displaced fracture on xray.  Mild arthritis. Radiologist reading pending..    Constitutional: Awake, Alert, Oriented  Eyes: EOMI  Psych: Mood and affect appropriate  Heart: regular rate   Lungs: No audible wheezing  Abdomen: No guarding  Lymph: no lymphedema      right Hip:  Appearance  No swelling, discoloration, deformity, or ecchymosis  No shingles rash  Palpation  No tenderness about the SI joint, iliac crest, anterior hip, or greater trochanter  ROM  Flexion: 80 (pain with flexion), ER: 10, IR: 10, and Abduction: 20  Special Tests  Straight leg raise negative (hip pain but not radiating pain)  + mild pain with log rolling (worse with flexion)  Motor  normal 5/5 in all planes hip, knee, and ankle  NVI distally

## 2024-01-09 ENCOUNTER — CONSULT (OUTPATIENT)
Dept: PULMONOLOGY | Facility: CLINIC | Age: 54
End: 2024-01-09
Payer: COMMERCIAL

## 2024-01-09 VITALS
OXYGEN SATURATION: 98 % | SYSTOLIC BLOOD PRESSURE: 124 MMHG | HEART RATE: 84 BPM | DIASTOLIC BLOOD PRESSURE: 82 MMHG | TEMPERATURE: 98.1 F

## 2024-01-09 DIAGNOSIS — R91.8 MULTIPLE LUNG NODULES: ICD-10-CM

## 2024-01-09 PROBLEM — Z98.890 STATUS POST PARATHYROIDECTOMY: Status: ACTIVE | Noted: 2024-01-09

## 2024-01-09 PROBLEM — Z90.89 STATUS POST PARATHYROIDECTOMY: Status: ACTIVE | Noted: 2024-01-09

## 2024-01-09 PROBLEM — E89.2 STATUS POST PARATHYROIDECTOMY (HCC): Status: ACTIVE | Noted: 2024-01-09

## 2024-01-09 PROCEDURE — 99244 OFF/OP CNSLTJ NEW/EST MOD 40: CPT | Performed by: INTERNAL MEDICINE

## 2024-01-10 ENCOUNTER — OFFICE VISIT (OUTPATIENT)
Dept: SURGICAL ONCOLOGY | Facility: CLINIC | Age: 54
End: 2024-01-10

## 2024-01-10 ENCOUNTER — TELEPHONE (OUTPATIENT)
Dept: HEMATOLOGY ONCOLOGY | Facility: CLINIC | Age: 54
End: 2024-01-10

## 2024-01-10 VITALS
DIASTOLIC BLOOD PRESSURE: 86 MMHG | OXYGEN SATURATION: 98 % | WEIGHT: 219.4 LBS | SYSTOLIC BLOOD PRESSURE: 138 MMHG | TEMPERATURE: 98.2 F | BODY MASS INDEX: 37.46 KG/M2 | RESPIRATION RATE: 18 BRPM | HEART RATE: 89 BPM | HEIGHT: 64 IN

## 2024-01-10 DIAGNOSIS — E89.2 STATUS POST PARATHYROIDECTOMY (HCC): Primary | ICD-10-CM

## 2024-01-10 PROCEDURE — 99024 POSTOP FOLLOW-UP VISIT: CPT | Performed by: SURGERY

## 2024-01-10 NOTE — PROGRESS NOTES
Surgical Oncology Follow Up       [facility] [place]  [address] [facility] [address]  [phone]    Ritu Rodriguez  1970  [id number]  [facility] [place]  [address] [facility] [address]  [phone]    Chief Complaint   Patient presents with    Post-op     Patient being seen for post op. Right MIP 12/22/2023.       Assessment/Plan:    No problem-specific Assessment & Plan notes found for this encounter.       Diagnoses and all orders for this visit:    Status post parathyroidectomy (HCC)  -     PTH, intact; Future  -     Calcium; Future  -     Vitamin D Panel; Future      Advance Care Planning/Advance Directives:  Discussed disease status, cancer treatment plans and/or cancer treatment goals with the patient.     Oncology History    No history exists.       History of Present Illness: Patient is a 53-year-old woman here for postop check status post parathyroidectomy.  -Interval History: No major issues or complaints except for right hip synovitis.    Review of Systems:  Review of Systems   Constitutional: Negative.    HENT: Negative.     Eyes: Negative.    Respiratory: Negative.     Cardiovascular: Negative.    Gastrointestinal: Negative.    Endocrine: Negative.    Genitourinary: Negative.    Musculoskeletal: Negative.         Synovitis hip   Skin: Negative.    Allergic/Immunologic: Negative.    Neurological: Negative.    Hematological: Negative.    Psychiatric/Behavioral: Negative.     All other systems reviewed and are negative.      Patient Active Problem List   Diagnosis    Dyspepsia    Change in bowel habits    Fatty liver    Hiatal hernia    History of adenomatous polyp of colon    Alvares's esophagus without dysplasia    Gastroesophageal reflux disease    Obesity    Hyperparathyroidism (HCC)    Hypercalcemia    Status post parathyroidectomy (HCC)     Past  Medical History:   Diagnosis Date    Colon polyp     Concussion with no loss of consciousness 10/02/2015    resolved 06/09/2017    GERD (gastroesophageal reflux disease)     Hypercalcemia     Hyperparathyroidism (HCC)      Past Surgical History:   Procedure Laterality Date    CHG ASSAY OF PARATHORMONE N/A 12/22/2023    Procedure: MONITORING INTRAOPERATIVE PTH (PARATHYROID HORMONE);  Surgeon: Geraldo Kenyon MD;  [facility];  Service: Surgical Oncology    COLONOSCOPY      NO PAST SURGERIES      MD COLONOSCOPY FLX DX W/COLLJ SPEC WHEN PFRMD N/A 04/06/2018    Procedure: EGD AND COLONOSCOPY;  Surgeon: Ottoniel Palumbo MD;  [facility];  Service: Gastroenterology    MD COLONOSCOPY FLX DX W/COLLJ SPEC WHEN PFRMD N/A 06/25/2018    Procedure: COLONOSCOPY;  Surgeon: Ottoniel Palumbo MD;  [facility];  Service: Gastroenterology    MD PARATHYROIDECTOMY/EXPLORATION PARATHYROIDS Right 12/22/2023    Procedure: MINIMALLY INVASIVE RIGHT PARATHYROIDECTOMY, POSSIBLE 4 GLAND EXPLORATION;  Surgeon: Geraldo Kenyon MD;  [facility];  Service: Surgical Oncology    UPPER GASTROINTESTINAL ENDOSCOPY       Family History   Problem Relation Age of Onset    Lung cancer Mother     Hypertension Father     Lung cancer Father     Prostate cancer Father 75    No Known Problems Sister     No Known Problems Sister     No Known Problems Sister     No Known Problems Daughter     No Known Problems Brother     No Known Problems Brother     No Known Problems Son     No Known Problems Son     Lung cancer Maternal Aunt     Colon cancer Paternal Aunt 68     Social History     Socioeconomic History    Marital status: /Civil Union     Spouse name: Not on file    Number of children: Not on file    Years of education: Not on file    Highest education level: Not on file   Occupational History    Not on file   Tobacco Use    Smoking status: Former     Current packs/day: 0.00     Types: Cigarettes     Start date:  1985     Quit date:      Years since quittin.0    Smokeless tobacco: Never   Vaping Use    Vaping status: Never Used   Substance and Sexual Activity    Alcohol use: Yes     Comment: social    Drug use: Never    Sexual activity: Yes     Partners: Male   Other Topics Concern    Not on file   Social History Narrative    Not on file     Social Determinants of Health     Financial Resource Strain: Not on file   Food Insecurity: Not on file   Transportation Needs: Not on file   Physical Activity: Not on file   Stress: Not on file   Social Connections: Not on file   Intimate Partner Violence: Not on file   Housing Stability: Not on file       Current Outpatient Medications:     methylPREDNISolone 4 MG tablet therapy pack, Use as directed on package, Disp: 1 each, Rfl: 0    pantoprazole (PROTONIX) 40 mg tablet, Take 1 tablet (40 mg total) by mouth 2 (two) times a day (Patient taking differently: Take 40 mg by mouth daily), Disp: 180 tablet, Rfl: 1    traMADol (Ultram) 50 mg tablet, Take 1 tablet (50 mg total) by mouth every 6 (six) hours as needed for moderate pain, Disp: 10 tablet, Rfl: 0  No Known Allergies  Vitals:    01/10/24 0857   BP: 138/86   Pulse: 89   Resp: 18   Temp: 98.2 °F (36.8 °C)   SpO2: 98%       Physical Exam  Vitals reviewed.   Constitutional:       Appearance: Normal appearance.   Neck:      Comments: Incision c/d/i  Musculoskeletal:      Cervical back: Normal range of motion and neck supple.   Neurological:      Mental Status: She is alert.           Results:  Labs:  Lab Results   Component Value Date    PTH 48.1 2023    CALCIUM 10.3 (H) 2023    PHOS 2.7 10/23/2023         Imaging  XR hip/pelv 2-3 vws right if performed    Result Date: 2024  Narrative: RIGHT HIP INDICATION:   Pain in right hip. COMPARISON:  Comparison made with previous examination(s) dated (PT) 04-Dec-2023,(CT) 15-Nov-2023,(SR) 24-Oct-2023,(NM) 24-Oct-2023. VIEWS:  XR HIP/PELV 2-3 VWS RIGHT W PELVIS IF  PERFORMED Images: 3 FINDINGS: There is no acute fracture or dislocation. Mild narrowing of the joint space is noted. No lytic or blastic osseous lesion. Soft tissues are unremarkable. The visualized lumbar spine is unremarkable.     Impression: Mild degenerative changes. No acute osseous injury identified. Electronically signed: 01/08/2024 09:27 PM Ritesh Burns MD    XR spine lumbar 2 or 3 views injury    Result Date: 1/8/2024  Narrative: LUMBAR SPINE INDICATION:   Pain in right hip. COMPARISON:  Comparison made with previous examination(s) dated (PT) 04-Dec-2023,(CT) 15-Nov-2023,(SR) 24-Oct-2023,(NM) 24-Oct-2023. VIEWS:  XR SPINE LUMBAR 2 OR 3 VIEWS INJURY Images: 3 FINDINGS: There are 5 non rib bearing lumbar vertebral bodies. There is no evidence of acute fracture or destructive osseous lesion. Alignment is unremarkable. No significant lumbar degenerative change noted. The pedicles appear intact. Soft tissues are unremarkable.     Impression: Normal examination. Electronically signed: 01/08/2024 09:26 PM Ritesh Burns MD    I reviewed the above laboratory and imaging data.    Discussion/Summary: 53-year-old woman, status post parathyroidectomy.  Doing well.  Follow-up in 6 months with repeat blood to assess for durability of operation at that time.  All questions answered and copy of path were given to her for her records.

## 2024-01-10 NOTE — TELEPHONE ENCOUNTER
Patient Running Late       Who are you speaking with? Patient   If it is not the patient, are they listed on an active communication consent form? N/A   When is the appointment scheduled?  Please list date and time 1/10/524 @ 830   At which location is the appointment scheduled to take place? [facility]   If patient is running less than 15 minutes late, have patient proceed to office. Appointment may need to be rescheduled at providers discretion. If provider cannot see patient today, the office will reschedule the visit.     Was this relayed to patient? Yes

## 2024-01-19 ENCOUNTER — HOSPITAL ENCOUNTER (OUTPATIENT)
Dept: RADIOLOGY | Facility: HOSPITAL | Age: 54
Discharge: HOME | End: 2024-01-19
Attending: STUDENT IN AN ORGANIZED HEALTH CARE EDUCATION/TRAINING PROGRAM
Payer: COMMERCIAL

## 2024-01-19 DIAGNOSIS — E21.3 HYPERPARATHYROIDISM (HCC): ICD-10-CM

## 2024-01-19 PROCEDURE — 77080 DXA BONE DENSITY AXIAL: CPT

## 2024-01-23 DIAGNOSIS — E21.3 HYPERPARATHYROIDISM (HCC): Primary | ICD-10-CM

## 2024-02-01 ENCOUNTER — EVALUATION (OUTPATIENT)
Dept: PHYSICAL THERAPY | Facility: CLINIC | Age: 54
End: 2024-02-01
Payer: COMMERCIAL

## 2024-02-01 DIAGNOSIS — M25.551 PAIN IN RIGHT HIP: ICD-10-CM

## 2024-02-01 DIAGNOSIS — M16.10 HIP ARTHRITIS: ICD-10-CM

## 2024-02-01 PROCEDURE — 97162 PT EVAL MOD COMPLEX 30 MIN: CPT | Performed by: PHYSICAL THERAPIST

## 2024-02-01 PROCEDURE — 97110 THERAPEUTIC EXERCISES: CPT | Performed by: PHYSICAL THERAPIST

## 2024-02-01 NOTE — PROGRESS NOTES
PT Evaluation     Today's date: 2024  Patient name: Ritu Rodriguez  : 1970  MRN: [id number]  Referring provider: Phani Vences PA-C  Dx:   Encounter Diagnosis     ICD-10-CM    1. Pain in right hip  M25.551 Ambulatory Referral to Physical Therapy      2. Hip arthritis  M16.10 Ambulatory Referral to Physical Therapy                     Assessment  Assessment details: Problem List:  1) hip hypomobility      Ritu Rodriguez is a pleasant 53 y.o. female who presents with R hip pain since having parathyroid removed surgically in December resulting in difficulty with ADLs and functional activities. She had no improvement with oral steroid pack. She does demonstrate hip hypomobility and findings consistent with intra-articular pathology. She responded favorably to hip mobilizations this date. She is negative for any lumbar contribution to her symptoms this date. She was agreeable to POC 2x/week to address these deficits and maximize return to function. She is out of town for one week for vacation and will return to PT on 24.  No further referral appears necessary at this time based upon examination results.     Comparable signs:  1) donning/doffing shoes/pants  2) sit to stands  Impairments: abnormal gait, abnormal muscle firing, abnormal muscle tone, abnormal or restricted ROM, abnormal movement, activity intolerance, impaired balance, impaired physical strength, lacks appropriate home exercise program, pain with function, weight-bearing intolerance and poor body mechanics    Symptom irritability: moderateBarriers to therapy: (+) motivated to return to riding her bike for exercise  (-) will be out of town for 1 week for vacation to start POC  Understanding of Dx/Px/POC: excellent  Goals  ST. Patient will be able to perform bed mobility without increase in symptoms in 4 weeks.  2. Patient will be able to perform sit to stand without increase in symptoms in 4 weeks.     LT. Patient will be able  to don/doff shoes/pants without increase in symptoms in 8 weeks.   2. Patient will be able to tolerate riding her upright bike with resistance for exercise in 8 weeks.   3. Patient will be independent with home exercise program.   4. Patient will be able to manage symptoms independently.     Plan  Patient would benefit from: skilled physical therapy  Planned modality interventions: TENS and cryotherapy  Planned therapy interventions: home exercise program, flexibility, functional ROM exercises, graded activity, strengthening, stretching, therapeutic activities, therapeutic exercise, postural training, patient education, neuromuscular re-education, nerve gliding, motor coordination training, manual therapy, kinesiology taping, joint mobilization, IASTM, activity modification, abdominal trunk stabilization, balance and balance/weight bearing training  Frequency: 2x week  Duration in weeks: 8  Treatment plan discussed with: patient        Subjective Evaluation    History of Present Illness  Mechanism of injury: She had surgery for parathyroid removal on 12/22/24. 48 hours later she started with lateral hip pain. She thought it was a calcium level issue from the surgery, but it didn't improve. She went to see Phani 1/8/24 in orthopedics. She was told it was a synovitis. She wanted her to try an injection. She thinks of the hip joint. She had a steroid pack which she finished, without relief.     The pain is now in the groin since the beginning of January. When she's up and walking she feels great. Transitional movements bother her. It hurts worse sit to stand than stand to sit. The pain shoots into the groin. She gets a tugging and a pain in the knee. The pain is always medial. It does sometimes radiate beyond the knee. She had some pain with radiating into the foot when she almost fell standing from the toilet last week. She denies n/t typically and she denies R sided pain. She has pain bending forward and bringing her  foot to her.     She denies increase in pain with cough/sneeze. She has difficulty with getting in/out of bed. She's been log rolling. The pain is better than it was in December/January.     She initially had difficulty with raising the leg. On steps, she leads with her left and does step to pattern. Coming down, she can do reciprocally. Once she stands up and gets over the initial pain and rubs the leg, the pain improves. Stretching does help her pain. She hasn't been able to tolerate her bike yet.     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 She wants to return to riding her exercise bike.   Patient Goals  Patient goals for therapy: decreased pain, increased motion, improved balance, increased strength and independence with ADLs/IADLs  Patient goal: be able to perform bed mobility, be able to get pants on, be able to stand/sit from a chair more easily, ride my bike again  Pain  Current pain ratin  At best pain ratin  At worst pain rating: 10  Location: R groin  Quality: sharp  Relieving factors: change in position (standing, walking, Lidocaine)  Aggravating factors: sitting (transfers, bending, picking R leg up)        Objective     Neurological Testing     Sensation     Lumbar   Left   Intact: light touch    Right   Intact: light touch    Reflexes   Left   Patellar (L4): normal (2+)  Achilles (S1): normal (2+)    Right   Patellar (L4): normal (2+)  Achilles (S1): normal (2+)    Active Range of Motion     Additional Active Range of Motion Details  >90* flexion b/l  IR/ER limited and painful on R     Strength/Myotome Testing     Lumbar   Left   Toe walk: normal    Right   Toe walk: normal    Left Hip   Planes of Motion   Flexion: 4+  External rotation: 4+    Right Hip   Planes of Motion   Flexion: 4  External rotation: 4-    Left Knee   Flexion: 5  Extension: 5    Right Knee   Flexion: 4+  Extension: 4+    Left Ankle/Foot   Dorsiflexion: 5  Great toe extension: 5    Right Ankle/Foot    Dorsiflexion: 5  Great toe extension: 5    Tests     Right Hip   Positive FADIR and scour.     Additional Tests Details  (+) LAD relief of pain    Improved tolerance to *sit to stand, hip flexion, and donning shoe after hip mobilizations              POC Expires Auth Status Start Date Expiration Date PT Visit Limit    4/1/24 2/1/24 1/8/25 60   Date 2/1       Used 1       Remaining 59          Diagnosis: R hip pain (Hip impingement and hip OA) - hip hypomobility   Precautions: Recent removal parathyroid   Manuals 2/1       Hip mobilizations RS                                       There Ex        Upright Bike        Self hip mobilizations 10x               Hip flexor stretch                Heel to shin                Leg Press                Neuro Re-Ed                bridges        clamshells        SLR flexion, abduction                                TKE        X-walks                                         Ther Act              sit to stands                           Modalities

## 2024-02-12 ENCOUNTER — OFFICE VISIT (OUTPATIENT)
Dept: PHYSICAL THERAPY | Facility: CLINIC | Age: 54
End: 2024-02-12
Payer: COMMERCIAL

## 2024-02-12 DIAGNOSIS — M25.551 PAIN IN RIGHT HIP: Primary | ICD-10-CM

## 2024-02-12 DIAGNOSIS — M16.10 HIP ARTHRITIS: ICD-10-CM

## 2024-02-12 PROCEDURE — 97112 NEUROMUSCULAR REEDUCATION: CPT | Performed by: PHYSICAL THERAPIST

## 2024-02-12 PROCEDURE — 97140 MANUAL THERAPY 1/> REGIONS: CPT | Performed by: PHYSICAL THERAPIST

## 2024-02-12 PROCEDURE — 97110 THERAPEUTIC EXERCISES: CPT | Performed by: PHYSICAL THERAPIST

## 2024-02-12 NOTE — PROGRESS NOTES
"Daily Note     Today's date: 2024  Patient name: Ritu Rodriguez  : 1970  MRN: [id number]  Referring provider: Phani Vences PA-C  Dx:   Encounter Diagnosis     ICD-10-CM    1. Pain in right hip  M25.551       2. Hip arthritis  M16.10                      Subjective: Patient reports that she's not so bad today. She had some rough days while she was in [place]. She was able to do some stretching and that does feel good. She denies any evolution of symptoms or n/t. She notes sharp, severe pain when sitting on the toilet and then go to stand.       Objective: See treatment diary below      Assessment: Tolerated treatment well. Patient would benefit from continued PT. She responded well to hip mobilizations this date. She had less difficulty with reproduction of hip flexion/ER with \"donning sock\" movement in supine. She tolerated DKTC with ball to mimic deep sit in an unloaded position. She was challenged with bridges with abduction cue, but able to perform without lumbar extension compensation. She was able to perform bridges with abduction cue of a band without lumbar extension compensation. She was able to perform sit to stand with 1 foam and cues for correct sit to stand technique.       Plan: Continue per plan of care.       POC Expires Auth Status Start Date Expiration Date PT Visit Limit    24 60   Date       Used 1 2      Remaining 59 58         Diagnosis: R hip pain (Hip impingement and hip OA) - hip hypomobility   Precautions: Recent removal parathyroid   Manuals       Hip mobilizations RS RS                                      There Ex        Upright Bike  5'       Self hip mobilizations 10x       DKTC with ball ER bias  5\"x15       Hip flexor stretch                Heel to shin                Leg Press                Neuro Re-Ed                bridges  Red 2x10       clamshells        SLR flexion, abduction                                TKE        X-walks    "                                      Ther Act              sit to stands    2x5 1 foam                       Modalities

## 2024-02-13 ENCOUNTER — APPOINTMENT (OUTPATIENT)
Dept: PHYSICAL THERAPY | Facility: CLINIC | Age: 54
End: 2024-02-13
Payer: COMMERCIAL

## 2024-02-15 ENCOUNTER — OFFICE VISIT (OUTPATIENT)
Dept: PHYSICAL THERAPY | Facility: CLINIC | Age: 54
End: 2024-02-15
Payer: COMMERCIAL

## 2024-02-15 DIAGNOSIS — M16.10 HIP ARTHRITIS: ICD-10-CM

## 2024-02-15 DIAGNOSIS — M25.551 PAIN IN RIGHT HIP: Primary | ICD-10-CM

## 2024-02-15 PROCEDURE — 97110 THERAPEUTIC EXERCISES: CPT | Performed by: PHYSICAL THERAPIST

## 2024-02-15 PROCEDURE — 97112 NEUROMUSCULAR REEDUCATION: CPT | Performed by: PHYSICAL THERAPIST

## 2024-02-15 PROCEDURE — 97140 MANUAL THERAPY 1/> REGIONS: CPT | Performed by: PHYSICAL THERAPIST

## 2024-02-15 NOTE — PROGRESS NOTES
"Daily Note     Today's date: 2/15/2024  Patient name: Ritu Rodriguez  : 1970  MRN: [id number]  Referring provider: Phani Vences PA-C  Dx:   Encounter Diagnosis     ICD-10-CM    1. Pain in right hip  M25.551       2. Hip arthritis  M16.10                      Subjective: Patient reports that she actually felt really good after last session until she bent down quickly to get her shoes out of the basket and she had sharp pain.       Objective: See treatment diary below      Assessment: Tolerated treatment well. Patient would benefit from continued PT. She required cues to avoid lumbar extension compensation with bridges with isometric hold. She was able to perform SLR flexion without quad lag and without any increase in hip symptoms. She tolerated active hip ER in open chain seated well. She performed TKE Without hip extension compensation.       Plan: Continue per plan of care.       POC Expires Auth Status Start Date Expiration Date PT Visit Limit    24 60   Date 2/1 2/12 2/15     Used 1 2 3     Remaining 59 58 57        Diagnosis: R hip pain (Hip impingement and hip OA) - hip hypomobility   Precautions: Recent removal parathyroid   Manuals 2/1 2/12 2/15     Hip mobilizations RS RS RS                                     There Ex        Upright Bike  5'  5'      Self hip mobilizations 10x       DKTC with ball ER bias  5\"x15       Hip flexor stretch   10x10\" EOB with strap             Heel to shin   2x10 ea              Leg Press                Neuro Re-Ed                bridges  Red 2x10  Green abd cue  5\"x10     clamshells   Green 2x10      SLR flexion, abduction   Flx 3x5                             TKE   Blue 5\"x20      X-walks                                         Ther Act              sit to stands    2x5 1 foam                       Modalities                                                           "

## 2024-02-19 ENCOUNTER — OFFICE VISIT (OUTPATIENT)
Dept: PHYSICAL THERAPY | Facility: CLINIC | Age: 54
End: 2024-02-19
Payer: COMMERCIAL

## 2024-02-19 DIAGNOSIS — M25.551 PAIN IN RIGHT HIP: Primary | ICD-10-CM

## 2024-02-19 DIAGNOSIS — M16.10 HIP ARTHRITIS: ICD-10-CM

## 2024-02-19 PROCEDURE — 97110 THERAPEUTIC EXERCISES: CPT | Performed by: PHYSICAL THERAPIST

## 2024-02-19 PROCEDURE — 97112 NEUROMUSCULAR REEDUCATION: CPT | Performed by: PHYSICAL THERAPIST

## 2024-02-19 NOTE — PROGRESS NOTES
"Daily Note     Today's date: 2024  Patient name: Ritu Rodriguez  : 1970  MRN: [id number]  Referring provider: Phani Vences PA-C  Dx:   Encounter Diagnosis     ICD-10-CM    1. Pain in right hip  M25.551       2. Hip arthritis  M16.10                      Subjective: Patient reports that she was able to do a modified yoga workout with her son. She is feeling better overall.       Objective: See treatment diary below      Assessment: Tolerated treatment well. Patient would benefit from continued PT. She continues with ER and hip extension flexibility/mobility deficits. She has improved tolerance to hip flexion. She was challenged with initiation of x-walks this date. She tolerated single leg leg press well.       Plan: Continue per plan of care.  Re-Evaluation next week.       POC Expires Auth Status Start Date Expiration Date PT Visit Limit    24 60   Date 2/1 2/12 2/15 2/19    Used 1 2 3 4    Remaining 59 58 57 56       Diagnosis: R hip pain (Hip impingement and hip OA) - hip hypomobility   Precautions: Recent removal parathyroid   Manuals 2/1 2/12 2/15 2/19    Hip mobilizations RS RS RS RS                                    There Ex        Upright Bike  5'  5'  5'     Self hip mobilizations 10x       DKTC with ball ER bias  5\"x15       Hip flexor stretch   10x10\" EOB with strap Stair 10x10\"    Hip flexion mobilization on stair  10x            Heel to shin   2x10 ea              Leg Press    SL 35# 2x10 ea             Neuro Re-Ed                bridges  Red 2x10  Green abd cue  5\"x10 Green abd cue  5\"x20    clamshells   Green 2x10      SLR flexion, abduction   Flx 3x5             Modified piriformis stretch ER     10x10\"             TKE   Blue 5\"x20      X-walks    Red 1x                                      Ther Act              sit to stands    2x5 1 foam                       Modalities                                                             "

## 2024-02-22 ENCOUNTER — APPOINTMENT (OUTPATIENT)
Dept: PHYSICAL THERAPY | Facility: CLINIC | Age: 54
End: 2024-02-22
Payer: COMMERCIAL

## 2024-02-26 ENCOUNTER — OFFICE VISIT (OUTPATIENT)
Dept: PHYSICAL THERAPY | Facility: CLINIC | Age: 54
End: 2024-02-26
Payer: COMMERCIAL

## 2024-02-26 DIAGNOSIS — M16.10 HIP ARTHRITIS: ICD-10-CM

## 2024-02-26 DIAGNOSIS — M25.551 PAIN IN RIGHT HIP: Primary | ICD-10-CM

## 2024-02-26 PROCEDURE — 97140 MANUAL THERAPY 1/> REGIONS: CPT

## 2024-02-26 PROCEDURE — 97112 NEUROMUSCULAR REEDUCATION: CPT

## 2024-02-26 PROCEDURE — 97110 THERAPEUTIC EXERCISES: CPT

## 2024-03-04 NOTE — PROGRESS NOTES
Discharge Summary    Patient missed today's appointment which was her planned re-evaluation due to a death in the family. She was contacted via phone and expressed that she is feeling much better. She was agreeable to discharge at this time. FOTO was emailed to patient. Future appointments cancelled. I'd be happy to see patient in the future under new POC.

## 2024-07-01 ENCOUNTER — TELEPHONE (OUTPATIENT)
Dept: SURGICAL ONCOLOGY | Facility: CLINIC | Age: 54
End: 2024-07-01

## 2024-07-01 NOTE — TELEPHONE ENCOUNTER
Called patient and left voice message to have blood work done prior to 7/12/24 appointment with FATIMAH Rivers.

## 2024-07-08 ENCOUNTER — TELEPHONE (OUTPATIENT)
Dept: SURGICAL ONCOLOGY | Facility: CLINIC | Age: 54
End: 2024-07-08

## 2024-07-08 NOTE — TELEPHONE ENCOUNTER
Called and spoke to patient. Reminded patient to have blood work done, prior to appointment with FATIMAH Rivers 7/12/24. Patient will have blood work done this week and also said, will maybe have to reschedule appointment 7/12/24. Patient will check calendar at home and give office a call back, if needed to reschedule follow up.

## 2024-08-20 ENCOUNTER — APPOINTMENT (OUTPATIENT)
Dept: LAB | Facility: HOSPITAL | Age: 54
End: 2024-08-20
Payer: COMMERCIAL

## 2024-08-20 DIAGNOSIS — Z00.8 ENCOUNTER FOR OTHER GENERAL EXAMINATION: ICD-10-CM

## 2024-08-20 DIAGNOSIS — E21.3 HYPERPARATHYROIDISM (HCC): ICD-10-CM

## 2024-08-20 DIAGNOSIS — Z90.89 STATUS POST PARATHYROIDECTOMY: ICD-10-CM

## 2024-08-20 DIAGNOSIS — Z98.890 STATUS POST PARATHYROIDECTOMY: ICD-10-CM

## 2024-08-20 LAB
ALBUMIN SERPL BCG-MCNC: 4.7 G/DL (ref 3.5–5)
CALCIUM SERPL-MCNC: 9.8 MG/DL (ref 8.4–10.2)
CHOLEST SERPL-MCNC: 220 MG/DL
EST. AVERAGE GLUCOSE BLD GHB EST-MCNC: 111 MG/DL
HBA1C MFR BLD: 5.5 %
HDLC SERPL-MCNC: 45 MG/DL
LDLC SERPL CALC-MCNC: 150 MG/DL (ref 0–100)
NONHDLC SERPL-MCNC: 175 MG/DL
PTH-INTACT SERPL-MCNC: 88.8 PG/ML (ref 12–88)
TRIGL SERPL-MCNC: 125 MG/DL

## 2024-08-20 PROCEDURE — 82306 VITAMIN D 25 HYDROXY: CPT

## 2024-08-20 PROCEDURE — 80061 LIPID PANEL: CPT

## 2024-08-20 PROCEDURE — 83036 HEMOGLOBIN GLYCOSYLATED A1C: CPT

## 2024-08-20 PROCEDURE — 82040 ASSAY OF SERUM ALBUMIN: CPT

## 2024-08-20 PROCEDURE — 36415 COLL VENOUS BLD VENIPUNCTURE: CPT

## 2024-08-20 PROCEDURE — 83970 ASSAY OF PARATHORMONE: CPT

## 2024-08-21 ENCOUNTER — OFFICE VISIT (OUTPATIENT)
Dept: FAMILY MEDICINE CLINIC | Facility: CLINIC | Age: 54
End: 2024-08-21
Payer: COMMERCIAL

## 2024-08-21 VITALS
OXYGEN SATURATION: 98 % | WEIGHT: 223 LBS | DIASTOLIC BLOOD PRESSURE: 80 MMHG | HEART RATE: 76 BPM | SYSTOLIC BLOOD PRESSURE: 136 MMHG | BODY MASS INDEX: 38.07 KG/M2 | HEIGHT: 64 IN | RESPIRATION RATE: 18 BRPM | TEMPERATURE: 97.6 F

## 2024-08-21 DIAGNOSIS — E78.5 DYSLIPIDEMIA: Primary | ICD-10-CM

## 2024-08-21 DIAGNOSIS — Z71.2 ENCOUNTER TO DISCUSS TEST RESULTS: ICD-10-CM

## 2024-08-21 PROCEDURE — 99213 OFFICE O/P EST LOW 20 MIN: CPT | Performed by: NURSE PRACTITIONER

## 2024-08-21 NOTE — PATIENT INSTRUCTIONS
"Patient Education     High cholesterol   The Basics   Written by the doctors and editors at [facility]   What is cholesterol? -- Cholesterol is a substance found in blood. Everyone has some. It is needed for good health. But people sometimes have too much cholesterol.  Compared with people with normal cholesterol, people with high cholesterol have a higher risk of heart attack, stroke, and other health problems. The higher your cholesterol, the higher your risk of these problems.  Are there different types of cholesterol? -- Yes, there are a few different types. If you get a cholesterol test, you might hear your doctor or nurse talk about:   Total cholesterol   LDL cholesterol - Some people call this the \"bad\" cholesterol. That's because having high LDL levels raises your risk of heart attack, stroke, and other health problems.   HDL cholesterol - Some people call this the \"good\" cholesterol. That's because people with high HDL levels tend to have a lower risk of heart attack, stroke, and other health problems.   Non-HDL cholesterol - Non-HDL cholesterol is your total cholesterol minus your HDL cholesterol.   Triglycerides - Triglycerides are not cholesterol. They are another type of fat. But they often get measured when cholesterol is measured. (Having high triglycerides also seems to increase the risk of heart attack and stroke.)  What should my numbers be? -- Ask your doctor or nurse what your numbers should be. Different people need different goals. If you live outside of the [place], see the table (table 1).  In general, people who do not already have heart disease should aim for:   Total cholesterol below 200   LDL cholesterol below 130, or much lower if they are at risk of heart attack or stroke   HDL cholesterol above 60   Non-HDL cholesterol below 160, or lower if they are at risk of heart attack or stroke   Triglycerides below 150  Remember, though, that many people who cannot meet these goals still have a low " "risk of heart attack and stroke.  What should I do if I have high cholesterol? -- Ask your doctor what your overall risk of heart attack and stroke is. Just having high cholesterol is not always a reason to worry. Having high cholesterol is just one of many things that can increase your risk of heart attack and stroke.  Other things that increase your risk include:   Smoking   High blood pressure   Having a parent or sibling who got heart disease at a young age - Young, in this case, means younger than 55 for males and younger than 65 for females.   A diet that is not heart healthy - A \"heart-healthy\" diet includes lots of fruits and vegetables, fiber, and healthy fats (like those found in fish, nuts, and certain oils). It also means limiting sugar and unhealthy fats.   Older age  If you are at high risk of heart attack and stroke, having high cholesterol is a problem. But if you are at low risk, high cholesterol might not need treatment.  Should I take medicine to lower cholesterol? -- Not everyone who has high cholesterol needs medicines. Your doctor or nurse will decide if you need them based on your age, family history, and other health concerns.  There are many different medicines used to lower cholesterol (table 2). Some help your body make less cholesterol. Some keep your body from absorbing cholesterol from foods. Some help your body get rid of cholesterol faster. The medicines most often used to treat high cholesterol are called \"statins.\"  You should probably take a statin if you:   Already had a heart attack or stroke   Have known heart disease   Have diabetes   Have a condition called \"peripheral artery disease,\" which makes it painful to walk, and happens when the arteries in your legs get clogged with fatty deposits   Have an \"abdominal aortic aneurysm,\" which is a widening of the main artery in the belly  Most people with any of the conditions listed above should take a statin no matter what their " "cholesterol level is. If your doctor or nurse prescribes a statin, it's important to keep taking it. The medicine might not make you feel any different. But it can help prevent heart attack, stroke, and death.  If your doctor or nurse recommends taking medicine to help lower your cholesterol, make sure that you know what it is called. Follow all the instructions for how to take it. For example, some medicines work better when you take them in the evening. Some need to be taken with food.  Tell your doctor or nurse if your medicine causes any side effects that bother you. They might be able to switch you to a different medicine.  Can I lower my cholesterol without medicines? -- Yes. You can help lower your cholesterol by doing these things:   You can lower your LDL, or \"bad,\" cholesterol by avoiding red meat, butter, fried foods, cheese, and other foods that have a lot of saturated fat.   You can lower triglycerides by avoiding sugary foods, fried foods, and excess alcohol.   If you have excess weight, it can help to lose weight. Your doctor or nurse can help you do this in a healthy way.   Try to get regular physical activity. Even gentle forms of exercise, like walking, are good for your health.  Even if these steps don't change your cholesterol very much, they can improve your health in many other ways.  All topics are updated as new evidence becomes available and our peer review process is complete.  This topic retrieved from [employer] on: Mar 01, 2024.  Topic 50813 Version 25.0  Release: 32.2.4 - C32.59  © 2024 UpToDate, Inc. and/or its affiliates. All rights reserved.  table 1: Cholesterol and triglyceride measurements in the [place] and elsewhere     Measurement used within the [place] Milligrams/deciliter (mg/dL)  Measurement used most places outside of the [place] Millimoles/liter (mmol/Liter)     Level to aim for  Level to aim for    Total cholesterol  Below 200 Below 5.17   LDL cholesterol  Below 130, or much lower if at " risk of heart attack and stroke Below 3.36, or much lower if at risk of heart attack and stroke   HDL cholesterol  Above 60 Above 1.55   Triglycerides  Below 150 Below 1.7   Cholesterol is measured differently in the [place] than it is in most other countries. This table shows values used within and outside of the [place]. It includes the cholesterol and triglyceride levels that most people who do not have heart disease should aim for.  Graphic 68725 Version 5.0  table 2: Lipid-lowering medicines  Generic name  Brand name    Statins    Atorvastatin Lipitor   Fluvastatin Lescol, Lescol XL   Lovastatin Mevacor, Altoprev   Pitavastatin Livalo   Pravastatin Pravachol   Rosuvastatin Crestor   Simvastatin Zocor   PCSK9 inhibitors    Alirocumab Praluent   Evolocumab Repatha, Repatha SureClick   Cholesterol absorption inhibitors    Ezetimibe Zetia   Bile acid sequestrants    Cholestyramine Prevalite, Questran, Questran Light   Colesevelam Welchol   Colestipol Colestid   Niacin (nicotinic acid)    Niacin immediate release     Niacin extended release Niaspan   Fibrates    Fenofibrate Fenoglide, Tricor, Triglide, others   Gemfibrozil Lopid   Brand names listed are for medicines available in the [place] and some other countries.  Graphic 63931 Version 7.0  Consumer Information Use and Disclaimer   Disclaimer: This generalized information is a limited summary of diagnosis, treatment, and/or medication information. It is not meant to be comprehensive and should be used as a tool to help the user understand and/or assess potential diagnostic and treatment options. It does NOT include all information about conditions, treatments, medications, side effects, or risks that may apply to a specific patient. It is not intended to be medical advice or a substitute for the medical advice, diagnosis, or treatment of a health care provider based on the health care provider's examination and assessment of a patient's specific and unique circumstances.  Patients must speak with a health care provider for complete information about their health, medical questions, and treatment options, including any risks or benefits regarding use of medications. This information does not endorse any treatments or medications as safe, effective, or approved for treating a specific patient. UpToDate, Inc. and its affiliates disclaim any warranty or liability relating to this information or the use thereof.The use of this information is governed by the Terms of Use, available at https://www.woltersAdInnovationuwer.com/en/know/clinical-effectiveness-terms. 2024© UpToDate, Inc. and its affiliates and/or licensors. All rights reserved.  Copyright   © 2024 UpToDate, Inc. and/or its affiliates. All rights reserved.

## 2024-08-21 NOTE — PROGRESS NOTES
"Assessment/Plan:    1. Dyslipidemia  -     Lipid Panel with Direct LDL reflex; Future  -     CT coronary calcium score; Future; Expected date: 2024  2. Encounter to discuss test results          No future appointments.        Subjective:      Patient ID: Ritu Rodriguez is a 53 y.o. female.    Chief Complaint   Patient presents with   • Follow-up     [id number]         Vitals:  /80   Pulse 76   Temp 97.6 °F (36.4 °C)   Resp 18   Ht 5' 4\" (1.626 m)   Wt 101 kg (223 lb)   LMP 2022 (Approximate) Comment: occsional spotting  SpO2 98%   BMI 38.28 kg/m²     HPI  Patient is here to discuss blood work  Lipid profile elevated. The nature of cardiac risk has been fully discussed with this patient. I have made her aware of her LDL target goal given her cardiovascular risk analysis. I have discussed the appropriate diet. The need for lifelong compliance in order to reduce risk is stressed. A regular exercise program is recommended to help achieve and maintain normal body weight, fitness and improve lipid balance.  Will repeat blood work after 4 months and will do CT coronary as father had heart attack          PHQ-2/9 Depression Screening    Little interest or pleasure in doing things: 0 - not at all  Feeling down, depressed, or hopeless: 0 - not at all  PHQ-2 Score: 0  PHQ-2 Interpretation: Negative depression screen             The following portions of the patient's history were reviewed and updated as appropriate: allergies, current medications, past family history, past medical history, past social history, past surgical history and problem list.      Review of Systems   Respiratory: Negative.     Cardiovascular: Negative.    Neurological: Negative.          Objective:    Social History     Tobacco Use   Smoking Status Former   • Current packs/day: 0.00   • Types: Cigarettes   • Start date:    • Quit date:    • Years since quittin.6   • Passive exposure: Past   Smokeless Tobacco Never "       Allergies: No Known Allergies      Current Outpatient Medications   Medication Sig Dispense Refill   • pantoprazole (PROTONIX) 40 mg tablet Take 1 tablet (40 mg total) by mouth 2 (two) times a day (Patient taking differently: Take 40 mg by mouth daily) 180 tablet 1     No current facility-administered medications for this visit.          Physical Exam  Constitutional:       Appearance: Normal appearance.   HENT:      Head: Normocephalic and atraumatic.      Nose: Nose normal.   Eyes:      Conjunctiva/sclera: Conjunctivae normal.   Cardiovascular:      Rate and Rhythm: Normal rate and regular rhythm.      Pulses: Normal pulses.      Heart sounds: Normal heart sounds.   Pulmonary:      Effort: Pulmonary effort is normal.      Breath sounds: Normal breath sounds.   Skin:     General: Skin is warm and dry.      Findings: No rash.   Neurological:      Mental Status: She is alert and oriented to person, place, and time.   Psychiatric:         Mood and Affect: Mood normal.         Behavior: Behavior normal.         Thought Content: Thought content normal.         Judgment: Judgment normal.                     FATIMAH Ford

## 2024-08-28 LAB
25(OH)D2 SERPL-MCNC: <1 NG/ML
25(OH)D3 SERPL-MCNC: 33 NG/ML
25(OH)D3+25(OH)D2 SERPL-MCNC: 33 NG/ML

## 2024-08-30 ENCOUNTER — HOSPITAL ENCOUNTER (OUTPATIENT)
Dept: CT IMAGING | Facility: HOSPITAL | Age: 54
Discharge: HOME | End: 2024-08-30
Payer: COMMERCIAL

## 2024-08-30 DIAGNOSIS — E78.5 DYSLIPIDEMIA: ICD-10-CM

## 2024-08-30 PROCEDURE — 75571 CT HRT W/O DYE W/CA TEST: CPT

## 2025-05-05 ENCOUNTER — TELEPHONE (OUTPATIENT)
Age: 55
End: 2025-05-05

## 2025-05-05 DIAGNOSIS — K76.0 FATTY LIVER: Primary | ICD-10-CM

## 2025-05-05 DIAGNOSIS — E21.3 HYPERPARATHYROIDISM (HCC): ICD-10-CM

## 2025-05-05 DIAGNOSIS — Z13.1 DIABETES MELLITUS SCREENING: ICD-10-CM

## 2025-05-05 DIAGNOSIS — Z13.6 SCREENING FOR CARDIOVASCULAR CONDITION: ICD-10-CM

## 2025-05-05 DIAGNOSIS — E83.52 HYPERCALCEMIA: ICD-10-CM

## 2025-05-05 NOTE — TELEPHONE ENCOUNTER
PT scheduled for a physical with Dr Moy on 6/20, and is requesting additional blood work, besides the labs needed for the caring starts with you program. She said she would be due for a calcium, and para thyroid.    Please advise    ThankYou

## 2025-05-10 ENCOUNTER — OFFICE VISIT (OUTPATIENT)
Dept: URGENT CARE | Facility: CLINIC | Age: 55
End: 2025-05-10
Payer: COMMERCIAL

## 2025-05-10 VITALS
RESPIRATION RATE: 16 BRPM | HEART RATE: 78 BPM | WEIGHT: 228 LBS | BODY MASS INDEX: 39.14 KG/M2 | OXYGEN SATURATION: 97 % | SYSTOLIC BLOOD PRESSURE: 148 MMHG | TEMPERATURE: 96.8 F | DIASTOLIC BLOOD PRESSURE: 78 MMHG

## 2025-05-10 DIAGNOSIS — B00.1 COLD SORE: Primary | ICD-10-CM

## 2025-05-10 DIAGNOSIS — L01.00 IMPETIGO: ICD-10-CM

## 2025-05-10 PROCEDURE — 99204 OFFICE O/P NEW MOD 45 MIN: CPT

## 2025-05-10 RX ORDER — CEPHALEXIN 500 MG/1
500 CAPSULE ORAL EVERY 12 HOURS SCHEDULED
Qty: 10 CAPSULE | Refills: 0 | Status: SHIPPED | OUTPATIENT
Start: 2025-05-10 | End: 2025-05-15

## 2025-05-10 RX ORDER — MUPIROCIN 20 MG/G
OINTMENT TOPICAL 3 TIMES DAILY
Qty: 30 G | Refills: 0 | Status: SHIPPED | OUTPATIENT
Start: 2025-05-10

## 2025-05-10 RX ORDER — VALACYCLOVIR HYDROCHLORIDE 1 G/1
1000 TABLET, FILM COATED ORAL 2 TIMES DAILY
Qty: 14 TABLET | Refills: 0 | Status: SHIPPED | OUTPATIENT
Start: 2025-05-10 | End: 2025-05-17

## 2025-05-10 NOTE — PROGRESS NOTES
[facility]        NAME: Ritu Rodriguez is a 54 y.o. female  : 1970    MRN: [id number]  DATE: May 10, 2025  TIME: 2:55 PM    Assessment and Plan   Cold sore [B00.1]  1. Cold sore  valACYclovir (VALTREX) 1,000 mg tablet      2. Impetigo  cephalexin (KEFLEX) 500 mg capsule    mupirocin (BACTROBAN) 2 % ointment        Herpes cold sore to bottom lip which she has history of. Started spreading and now having honey colored crusting over. Likely overlaying impetigo. Will treat for both.     Patient Instructions       Follow up with PCP in 3-5 days.  Proceed to  ER if symptoms worsen.    If tests are performed, our office will contact you with results only if changes need to made to the care plan discussed with you at the visit. You can review your full results on [facility].    Chief Complaint     Chief Complaint   Patient presents with    Rash     Patient rash after returning from [place] this week. Has spread to upper lip , started on lower lip. B/l redness on cheeks and chin. Reports diarrhea yesterday. Denies nausea or vomiting. Some abdominal reported when had loose stool, none reported now.          History of Present Illness       Recently returned from [place].     Rash  This is a new problem. The affected locations include the face and lips. Associated symptoms include diarrhea (yesterday). Pertinent negatives include no congestion, cough, fever, rhinorrhea, shortness of breath, sore throat or vomiting.       Review of Systems   Review of Systems   Constitutional:  Negative for chills and fever.   HENT:  Negative for congestion, postnasal drip, rhinorrhea, sinus pressure, sore throat and trouble swallowing.    Respiratory:  Negative for cough, chest tightness and shortness of breath.    Cardiovascular:  Negative for chest pain and palpitations.   Gastrointestinal:  Positive for diarrhea (yesterday). Negative for abdominal pain, nausea and vomiting.   Genitourinary:  Negative for difficulty  urinating.   Musculoskeletal:  Negative for myalgias.   Skin:  Positive for rash.   Neurological:  Negative for dizziness and headaches.         Current Medications       Current Outpatient Medications:     Cholecalciferol 50 MCG (2000 UT) TABS, Take by mouth, Disp: , Rfl:     cephalexin (KEFLEX) 500 mg capsule, Take 1 capsule (500 mg total) by mouth every 12 (twelve) hours for 5 days, Disp: 10 capsule, Rfl: 0    mupirocin (BACTROBAN) 2 % ointment, Apply topically 3 (three) times a day, Disp: 30 g, Rfl: 0    pantoprazole (PROTONIX) 40 mg tablet, Take 1 tablet (40 mg total) by mouth 2 (two) times a day (Patient not taking: Reported on 5/10/2025), Disp: 180 tablet, Rfl: 1    valACYclovir (VALTREX) 1,000 mg tablet, Take 1 tablet (1,000 mg total) by mouth 2 (two) times a day for 7 days, Disp: 14 tablet, Rfl: 0    Current Allergies     Allergies as of 05/10/2025    (No Known Allergies)            The following portions of the patient's history were reviewed and updated as appropriate: allergies, current medications, past family history, past medical history, past social history, past surgical history and problem list.     Past Medical History:   Diagnosis Date    Colon polyp     Concussion with no loss of consciousness 10/02/2015    resolved 06/09/2017    GERD (gastroesophageal reflux disease)     Hypercalcemia     Hyperparathyroidism (HCC)        Past Surgical History:   Procedure Laterality Date    CHG ASSAY OF PARATHORMONE N/A 12/22/2023    Procedure: MONITORING INTRAOPERATIVE PTH (PARATHYROID HORMONE);  Surgeon: Geraldo Kenyon MD;  [facility];  Service: Surgical Oncology    COLONOSCOPY      NO PAST SURGERIES      IN COLONOSCOPY FLX DX W/COLLJ SPEC WHEN PFRMD N/A 04/06/2018    Procedure: EGD AND COLONOSCOPY;  Surgeon: Ottoniel Palumbo MD;  [facility];  Service: Gastroenterology    IN COLONOSCOPY FLX DX W/COLLJ SPEC WHEN PFRMD N/A 06/25/2018    Procedure: COLONOSCOPY;  Surgeon: Ottoniel GONZALEZ  MD Deepali;  [facility];  Service: Gastroenterology    DE PARATHYROIDECTOMY/EXPLORATION PARATHYROIDS Right 12/22/2023    Procedure: MINIMALLY INVASIVE RIGHT PARATHYROIDECTOMY, POSSIBLE 4 GLAND EXPLORATION;  Surgeon: Geraldo Kenyon MD;  [facility];  Service: Surgical Oncology    UPPER GASTROINTESTINAL ENDOSCOPY         Family History   Problem Relation Age of Onset    Lung cancer Mother     Hypertension Father     Lung cancer Father     Prostate cancer Father 75    No Known Problems Sister     No Known Problems Sister     No Known Problems Sister     No Known Problems Daughter     No Known Problems Brother     No Known Problems Brother     No Known Problems Son     No Known Problems Son     Lung cancer Maternal Aunt     Colon cancer Paternal Aunt 68         Medications have been verified.        Objective   /78   Pulse 78   Temp (!) 96.8 °F (36 °C) (Tympanic)   Resp 16   Wt 103 kg (228 lb)   LMP 04/04/2022 (Approximate) Comment: occsional spotting  SpO2 97%   BMI 39.14 kg/m²        Physical Exam     Physical Exam  Constitutional:       General: She is not in acute distress.  HENT:      Head: Normocephalic.      Nose: Nose normal.      Mouth/Throat:      Comments: Multiple cold sores to upper and lower lip. Yellow colored crusting over sores and under nose   Eyes:      Pupils: Pupils are equal, round, and reactive to light.   Cardiovascular:      Rate and Rhythm: Normal rate and regular rhythm.      Pulses: Normal pulses.      Heart sounds: Normal heart sounds.   Pulmonary:      Effort: Pulmonary effort is normal.      Breath sounds: Normal breath sounds.   Abdominal:      General: Abdomen is flat.   Musculoskeletal:         General: Normal range of motion.   Skin:     General: Skin is warm and dry.      Capillary Refill: Capillary refill takes less than 2 seconds.   Neurological:      Mental Status: She is alert and oriented to person, place, and time.

## 2025-06-25 ENCOUNTER — APPOINTMENT (OUTPATIENT)
Dept: LAB | Facility: HOSPITAL | Age: 55
End: 2025-06-25
Attending: FAMILY MEDICINE
Payer: COMMERCIAL

## 2025-06-25 ENCOUNTER — APPOINTMENT (OUTPATIENT)
Dept: LAB | Facility: HOSPITAL | Age: 55
End: 2025-06-25
Payer: COMMERCIAL

## 2025-06-25 DIAGNOSIS — E21.3 HYPERPARATHYROIDISM (HCC): ICD-10-CM

## 2025-06-25 DIAGNOSIS — Z00.8 ENCOUNTER FOR OTHER GENERAL EXAMINATION: ICD-10-CM

## 2025-06-25 DIAGNOSIS — Z13.6 SCREENING FOR CARDIOVASCULAR CONDITION: ICD-10-CM

## 2025-06-25 DIAGNOSIS — Z13.1 DIABETES MELLITUS SCREENING: ICD-10-CM

## 2025-06-25 DIAGNOSIS — K76.0 FATTY LIVER: ICD-10-CM

## 2025-06-25 LAB
ALBUMIN SERPL BCG-MCNC: 4.5 G/DL (ref 3.5–5)
ALP SERPL-CCNC: 64 U/L (ref 34–104)
ALT SERPL W P-5'-P-CCNC: 32 U/L (ref 7–52)
ANION GAP SERPL CALCULATED.3IONS-SCNC: 9 MMOL/L (ref 4–13)
AST SERPL W P-5'-P-CCNC: 19 U/L (ref 13–39)
BILIRUB SERPL-MCNC: 0.51 MG/DL (ref 0.2–1)
BUN SERPL-MCNC: 12 MG/DL (ref 5–25)
CALCIUM SERPL-MCNC: 9.4 MG/DL (ref 8.4–10.2)
CHLORIDE SERPL-SCNC: 107 MMOL/L (ref 96–108)
CHOLEST SERPL-MCNC: 171 MG/DL (ref ?–200)
CO2 SERPL-SCNC: 24 MMOL/L (ref 21–32)
CREAT SERPL-MCNC: 0.75 MG/DL (ref 0.6–1.3)
ERYTHROCYTE [DISTWIDTH] IN BLOOD BY AUTOMATED COUNT: 13.5 % (ref 11.6–15.1)
EST. AVERAGE GLUCOSE BLD GHB EST-MCNC: 117 MG/DL
GFR SERPL CREATININE-BSD FRML MDRD: 90 ML/MIN/1.73SQ M
GLUCOSE P FAST SERPL-MCNC: 112 MG/DL (ref 65–99)
HBA1C MFR BLD: 5.7 %
HCT VFR BLD AUTO: 47.6 % (ref 34.8–46.1)
HDLC SERPL-MCNC: 44 MG/DL
HGB BLD-MCNC: 15.7 G/DL (ref 11.5–15.4)
LDLC SERPL CALC-MCNC: 108 MG/DL (ref 0–100)
MCH RBC QN AUTO: 28.1 PG (ref 26.8–34.3)
MCHC RBC AUTO-ENTMCNC: 33 G/DL (ref 31.4–37.4)
MCV RBC AUTO: 85 FL (ref 82–98)
PLATELET # BLD AUTO: 228 THOUSANDS/UL (ref 149–390)
PMV BLD AUTO: 10.3 FL (ref 8.9–12.7)
POTASSIUM SERPL-SCNC: 4.1 MMOL/L (ref 3.5–5.3)
PROT SERPL-MCNC: 7.1 G/DL (ref 6.4–8.4)
PTH-INTACT SERPL-MCNC: 91.2 PG/ML (ref 12–88)
RBC # BLD AUTO: 5.59 MILLION/UL (ref 3.81–5.12)
SODIUM SERPL-SCNC: 140 MMOL/L (ref 135–147)
TRIGL SERPL-MCNC: 97 MG/DL (ref ?–150)
WBC # BLD AUTO: 9.14 THOUSAND/UL (ref 4.31–10.16)

## 2025-06-25 PROCEDURE — 80053 COMPREHEN METABOLIC PANEL: CPT

## 2025-06-25 PROCEDURE — 83970 ASSAY OF PARATHORMONE: CPT

## 2025-06-25 PROCEDURE — 83036 HEMOGLOBIN GLYCOSYLATED A1C: CPT

## 2025-06-25 PROCEDURE — 80061 LIPID PANEL: CPT

## 2025-06-25 PROCEDURE — 36415 COLL VENOUS BLD VENIPUNCTURE: CPT

## 2025-06-25 PROCEDURE — 85027 COMPLETE CBC AUTOMATED: CPT

## 2025-06-27 ENCOUNTER — OFFICE VISIT (OUTPATIENT)
Dept: FAMILY MEDICINE CLINIC | Facility: CLINIC | Age: 55
End: 2025-06-27
Payer: COMMERCIAL

## 2025-06-27 ENCOUNTER — TELEPHONE (OUTPATIENT)
Dept: FAMILY MEDICINE CLINIC | Facility: CLINIC | Age: 55
End: 2025-06-27

## 2025-06-27 ENCOUNTER — PATIENT MESSAGE (OUTPATIENT)
Dept: FAMILY MEDICINE CLINIC | Facility: CLINIC | Age: 55
End: 2025-06-27

## 2025-06-27 VITALS
DIASTOLIC BLOOD PRESSURE: 90 MMHG | TEMPERATURE: 98.1 F | SYSTOLIC BLOOD PRESSURE: 136 MMHG | WEIGHT: 232 LBS | HEART RATE: 76 BPM | RESPIRATION RATE: 20 BRPM | HEIGHT: 65 IN | BODY MASS INDEX: 38.65 KG/M2

## 2025-06-27 DIAGNOSIS — E66.9 OBESITY (BMI 30-39.9): ICD-10-CM

## 2025-06-27 DIAGNOSIS — Z12.4 SCREENING FOR CERVICAL CANCER: ICD-10-CM

## 2025-06-27 DIAGNOSIS — E66.812 CLASS 2 OBESITY DUE TO EXCESS CALORIES WITH BODY MASS INDEX (BMI) OF 38.0 TO 38.9 IN ADULT, UNSPECIFIED WHETHER SERIOUS COMORBIDITY PRESENT: Primary | ICD-10-CM

## 2025-06-27 DIAGNOSIS — Z00.00 ANNUAL PHYSICAL EXAM: Primary | ICD-10-CM

## 2025-06-27 DIAGNOSIS — Z12.31 ENCOUNTER FOR SCREENING MAMMOGRAM FOR BREAST CANCER: ICD-10-CM

## 2025-06-27 DIAGNOSIS — E66.09 CLASS 2 OBESITY DUE TO EXCESS CALORIES WITH BODY MASS INDEX (BMI) OF 38.0 TO 38.9 IN ADULT, UNSPECIFIED WHETHER SERIOUS COMORBIDITY PRESENT: Primary | ICD-10-CM

## 2025-06-27 DIAGNOSIS — E21.3 HYPERPARATHYROIDISM (HCC): ICD-10-CM

## 2025-06-27 DIAGNOSIS — D58.2 ELEVATED HEMOGLOBIN (HCC): ICD-10-CM

## 2025-06-27 PROCEDURE — G0476 HPV COMBO ASSAY CA SCREEN: HCPCS | Performed by: FAMILY MEDICINE

## 2025-06-27 PROCEDURE — G0145 SCR C/V CYTO,THINLAYER,RESCR: HCPCS | Performed by: FAMILY MEDICINE

## 2025-06-27 PROCEDURE — 99396 PREV VISIT EST AGE 40-64: CPT | Performed by: FAMILY MEDICINE

## 2025-06-27 RX ORDER — SEMAGLUTIDE 0.25 MG/.5ML
INJECTION, SOLUTION SUBCUTANEOUS
Qty: 2 ML | Refills: 0 | Status: SHIPPED | OUTPATIENT
Start: 2025-06-27 | End: 2025-06-27 | Stop reason: SDUPTHER

## 2025-06-27 RX ORDER — SEMAGLUTIDE 0.25 MG/.5ML
INJECTION, SOLUTION SUBCUTANEOUS
Qty: 2 ML | Refills: 0 | Status: SHIPPED | OUTPATIENT
Start: 2025-06-27

## 2025-06-27 NOTE — PATIENT INSTRUCTIONS
"Patient Education     Routine physical for adults   The Basics   Written by the doctors and editors at [facility]   What is a physical? -- A physical is a routine visit, or \"check-up,\" with your doctor. You might also hear it called a \"wellness visit\" or \"preventive visit.\"  During each visit, the doctor will:   Ask about your physical and mental health   Ask about your habits, behaviors, and lifestyle   Do an exam   Give you vaccines if needed   Talk to you about any medicines you take   Give advice about your health   Answer your questions  Getting regular check-ups is an important part of taking care of your health. It can help your doctor find and treat any problems you have. But it's also important for preventing health problems.  A routine physical is different from a \"sick visit.\" A sick visit is when you see a doctor because of a health concern or problem. Since physicals are scheduled ahead of time, you can think about what you want to ask the doctor.  How often should I get a physical? -- It depends on your age and health. In general, for people age 21 years and older:   If you are younger than 50 years, you might be able to get a physical every 3 years.   If you are 50 years or older, your doctor might recommend a physical every year.  If you have an ongoing health condition, like diabetes or high blood pressure, your doctor will probably want to see you more often.  What happens during a physical? -- In general, each visit will include:   Physical exam - The doctor or nurse will check your height, weight, heart rate, and blood pressure. They will also look at your eyes and ears. They will ask about how you are feeling and whether you have any symptoms that bother you.   Medicines - It's a good idea to bring a list of all the medicines you take to each doctor visit. Your doctor will talk to you about your medicines and answer any questions. Tell them if you are having any side effects that bother you. You " "should also tell them if you are having trouble paying for any of your medicines.   Habits and behaviors - This includes:   Your diet   Your exercise habits   Whether you smoke, drink alcohol, or use drugs   Whether you are sexually active   Whether you feel safe at home  Your doctor will talk to you about things you can do to improve your health and lower your risk of health problems. They will also offer help and support. For example, if you want to quit smoking, they can give you advice and might prescribe medicines. If you want to improve your diet or get more physical activity, they can help you with this, too.   Lab tests, if needed - The tests you get will depend on your age and situation. For example, your doctor might want to check your:   Cholesterol   Blood sugar   Iron level   Vaccines - The recommended vaccines will depend on your age, health, and what vaccines you already had. Vaccines are very important because they can prevent certain serious or deadly infections.   Discussion of screening - \"Screening\" means checking for diseases or other health problems before they cause symptoms. Your doctor can recommend screening based on your age, risk, and preferences. This might include tests to check for:   Cancer, such as breast, prostate, cervical, ovarian, colorectal, prostate, lung, or skin cancer   Sexually transmitted infections, such as chlamydia and gonorrhea   Mental health conditions like depression and anxiety  Your doctor will talk to you about the different types of screening tests. They can help you decide which screenings to have. They can also explain what the results might mean.   Answering questions - The physical is a good time to ask the doctor or nurse questions about your health. If needed, they can refer you to other doctors or specialists, too.  Adults older than 65 years often need other care, too. As you get older, your doctor will talk to you about:   How to prevent falling at " home   Hearing or vision tests   Memory testing   How to take your medicines safely   Making sure that you have the help and support you need at home  All topics are updated as new evidence becomes available and our peer review process is complete.  This topic retrieved from [employer] on: May 02, 2024.  Topic 876643 Version 1.0  Release: 32.4.3 - C32.122  © 2024 UpToDate, Inc. and/or its affiliates. All rights reserved.  Consumer Information Use and Disclaimer   Disclaimer: This generalized information is a limited summary of diagnosis, treatment, and/or medication information. It is not meant to be comprehensive and should be used as a tool to help the user understand and/or assess potential diagnostic and treatment options. It does NOT include all information about conditions, treatments, medications, side effects, or risks that may apply to a specific patient. It is not intended to be medical advice or a substitute for the medical advice, diagnosis, or treatment of a health care provider based on the health care provider's examination and assessment of a patient's specific and unique circumstances. Patients must speak with a health care provider for complete information about their health, medical questions, and treatment options, including any risks or benefits regarding use of medications. This information does not endorse any treatments or medications as safe, effective, or approved for treating a specific patient. UpToDate, Inc. and its affiliates disclaim any warranty or liability relating to this information or the use thereof.The use of this information is governed by the Terms of Use, available at https://www.woltersBridgefyuwer.com/en/know/clinical-effectiveness-terms. 2024© UpToDate, Inc. and its affiliates and/or licensors. All rights reserved.  Copyright   © 2024 UpToDate, Inc. and/or its affiliates. All rights reserved.

## 2025-06-27 NOTE — PROGRESS NOTES
Adult Annual Physical  Name: Ritu Rodriguez      : 1970      MRN: [id number]  Encounter Provider: Diana Easton MD  Encounter Date: 2025   Encounter department: [facility]    :  Assessment & Plan  Annual physical exam         Hyperparathyroidism (HCC)         Encounter for screening mammogram for breast cancer    Orders:    Mammo screening bilateral w 3d and cad; Future    Screening for cervical cancer    Orders:    Liquid-based pap, screening    Obesity (BMI 30-39.9)  Prior Authorization Clinical Questions for Weight Management Pharmacotherapy    1. Does the patient have a contrainidcation to medication prescribed for weight management?: No  2. Does the patient have a diagnosis of obesity, confirmed by a BMI greater than or equal to 30 kg/m^2?: Yes  3. Does the patient have a BMI of greater than or equal to 27 kg/m^2 with at least one weight-related comorbidity/risk factor/complication (e.g. diabetes, dyslipidemia, coronary artery disease)?: Yes  4. Weight-related co-morbidities/risk factors: prediabetes, dyslipidemia, hypertension  5. WEGOVY CVA Indication: Does patient have established documented cardiovascular disease (history of a prior heart attack (myocardial infarction), stroke, or symptomatic peripheral arterial disease (PAD)?: No  6. ZEPBOUND MELODY Indication: Does patient have documented MELODY diagnosed via sleep study (insurance will require copy of sleep study results for approval)?: No  7. Has the patient been on a weight loss regimen of low-calorie diet, increased physical activity, and lifestyle modifications for a minimum of 6 months?: Yes  8. Has the patient completed a comprehensive weight loss program (ie, Weight Watchers, Noom, Bariatrics, other lisandro on phone)? If so, what?: Yes   -- Q8 Further Explanation: weight watchers years ago   9. Does the patient have a history of type 2 diabetes?: No  10. Has the member tried and failed other weight loss medication within the past  12 months?: No  11. Will the member use requested medication in combination with another GLP agonist or weight loss drug?: No  12. Is the medication a controlled substance?: No     Baseline weight (in pounds): 232 lbs  Current weight (in pounds): 232 lbs  Weight loss percentage: 0%     She is interested in GLP-1 medication.   No personal or family history of pancreatitis or MEN syndrome.   She does have a history of hyperparathyroidism so I did suggest reaching out to her endocrinologist to see if they are ok with GLP -1 medication as well.        Elevated hemoglobin (HCC)    Orders:    CBC and Platelet; Future        Preventive Screenings:  - Diabetes Screening: screening up-to-date  - Cholesterol Screening: screening up-to-date   - Hepatitis C screening: screening up-to-date   - HIV screening: screening up-to-date   - Cervical cancer screening: risks/benefits discussed and orders placed   - Breast cancer screening: risks/benefits discussed and orders placed   - Colon cancer screening: screening up-to-date   - Lung cancer screening: screening not indicated   - Osteoporosis screening: screening up-to-date     Counseling/Anticipatory Guidance:    - Diet: discussed recommendations for a healthy/well-balanced diet.   - Exercise: the importance of regular exercise/physical activity was discussed. Recommend exercise 3-5 times per week for at least 30 minutes.       Depression Screening and Follow-up Plan: Patient was screened for depression during today's encounter. They screened negative with a PHQ-2 score of 0.          History of Present Illness     Adult Annual Physical:  Patient presents for annual physical.     Diet and Physical Activity:  - Diet/Nutrition: no special diet.  - Exercise: no formal exercise.    Depression Screening:  - PHQ-2 Score: 0    General Health:  - Sleep: unrefreshing sleep and 4-6 hours of sleep on average.  - Hearing: normal hearing right ear and normal hearing left ear.  - Vision: wears  "glasses.  - Dental: regular dental visits.    /GYN Health:  - Follows with GYN: no.     Advanced Care Planning:  - Has an advanced directive?: no    - Has a durable medical POA?: no      Review of Systems   Constitutional: Negative.    HENT: Negative.     Eyes: Negative.    Respiratory: Negative.     Cardiovascular: Negative.    Gastrointestinal: Negative.    Endocrine: Negative.    Genitourinary: Negative.    Musculoskeletal: Negative.    Skin: Negative.    Allergic/Immunologic: Negative.    Neurological: Negative.    Hematological: Negative.    Psychiatric/Behavioral: Negative.           Objective   /90   Pulse 76   Temp 98.1 °F (36.7 °C)   Resp 20   Ht 5' 4.5\" (1.638 m)   Wt 105 kg (232 lb)   LMP 04/04/2022 (Approximate) Comment: occsional spotting  BMI 39.21 kg/m²     Physical Exam  Vitals and nursing note reviewed.   Constitutional:       General: She is not in acute distress.     Appearance: She is well-developed.   HENT:      Head: Normocephalic and atraumatic.      Right Ear: Tympanic membrane, ear canal and external ear normal. There is no impacted cerumen.      Left Ear: Tympanic membrane, ear canal and external ear normal. There is no impacted cerumen.      Nose: Nose normal.      Mouth/Throat:      Mouth: Mucous membranes are moist.     Eyes:      Conjunctiva/sclera: Conjunctivae normal.       Cardiovascular:      Rate and Rhythm: Normal rate and regular rhythm.      Heart sounds: No murmur heard.  Pulmonary:      Effort: Pulmonary effort is normal. No respiratory distress.      Breath sounds: Normal breath sounds.   Abdominal:      General: Abdomen is flat. There is no distension.      Palpations: Abdomen is soft. There is no mass.      Tenderness: There is no abdominal tenderness. There is no guarding or rebound.      Hernia: No hernia is present.     Musculoskeletal:         General: Normal range of motion.      Cervical back: Neck supple.     Skin:     General: Skin is warm and dry. "     Neurological:      General: No focal deficit present.      Mental Status: She is alert and oriented to person, place, and time.

## 2025-06-30 NOTE — TELEPHONE ENCOUNTER
PA for WEGOVY 0.25MG SUBMITTED to [employer]    via    [x][employer]-Case ID #     [x]PA sent as URGENT    All office notes, labs and other pertaining documents and studies sent. Clinical questions answered. Awaiting determination from insurance company.     Turnaround time for your insurance to make a decision on your Prior Authorization can take 7-21 business days.

## 2025-06-30 NOTE — TELEPHONE ENCOUNTER
Patient called in asking if she can be notified when the medication has been approved.  Please advise.

## 2025-07-02 ENCOUNTER — TELEPHONE (OUTPATIENT)
Dept: FAMILY MEDICINE CLINIC | Facility: CLINIC | Age: 55
End: 2025-07-02

## 2025-07-02 LAB
LAB AP GYN PRIMARY INTERPRETATION: NORMAL
Lab: NORMAL

## 2025-07-02 NOTE — TELEPHONE ENCOUNTER
"Please forward to prior auth team please. Thank you.   Ashlee Moy DO to Diana Easton MD        7/2/25  9:49 AM  Dr. Easton,  You saw her regarding this.  Thank you,  Ashlee Moy DO     6/27/25  4:24 PM  You routed this conversation to [facility]    6/27/25  3:36 PM  Annika Peña, EDEN routed this conversation to [facility]  Ritu Rodriguez to P [facility] (supporting Diana Easton MD)        6/27/25  3:23 PM  Hi. [facility] requires prior authorization from the insurance company…  Ritu Rodriguez to  Primary [facility] (supporting Diana Easton MD)        6/27/25  2:58 PM  Thank you.    Messages that only contain a simple \"Thank you\" are hidden in In Basket to save you clicks.  Diana Easton MD to Ritu Rodriguez        6/27/25  2:57 PM  Great, I sent Wegovy over to [facility] in [place]. I believe with . [employer]'s insurance it is [facility]. Please follow up in 1 month for weight check.     Last read by Ritu Rodriguez at 1:35PM on 7/1/2025.    6/27/25  2:55 PM  Che Bravo routed this conversation to MD Che Toussaint to Ashlee Moy DO        6/27/25  2:55 PM  The endocrine message below ----  Ritu Rodriguez to  Endocrinology [facility] (supporting Ander Aguilera DO)        6/27/25 11:27 AM  Hello. My primary is seeking endocrine approval for wagovy due to my hyperparathyroid history. Kindly advise.   Thank you!    6/27/25 11:29 AM  Mishel Flanagan routed this conversation to [facility]   Zulema Cardona MA to Ander Aguilera DO         6/27/25 11:33 AM  Patient seen 1 x 2023  Ander Aguilera DO to Ritu Rodriguez        6/27/25 12:32 PM  Hi Ritu,     I hope you are well. There is no barrier to start GLP therapy (eg wegovy) on the basis of your parathyroid history. Please let me know if there is any other way I can be of " help.     Best wishes,    Dr. Aguilera

## 2025-07-02 NOTE — TELEPHONE ENCOUNTER
PA for WEGOVY 0.25MG APPROVED     Date(s) approved June 30, 2025 to June 30, 2026       Patient advised by          [x]MyChart Message  []Phone call   []LMOM  []L/M to call office as no active Communication consent on file  []Unable to leave detailed message as VM not approved on Communication consent       Pharmacy advised by    [x]Fax  []Phone call  []Secure Chat

## 2025-07-03 ENCOUNTER — HOSPITAL ENCOUNTER (OUTPATIENT)
Dept: RADIOLOGY | Facility: HOSPITAL | Age: 55
Discharge: HOME | End: 2025-07-03
Payer: COMMERCIAL

## 2025-07-03 DIAGNOSIS — R91.8 MULTIPLE LUNG NODULES: ICD-10-CM

## 2025-07-03 PROCEDURE — G1004 CDSM NDSC: HCPCS

## 2025-07-03 PROCEDURE — 71250 CT THORAX DX C-: CPT

## 2025-07-07 ENCOUNTER — TELEPHONE (OUTPATIENT)
Dept: FAMILY MEDICINE CLINIC | Facility: CLINIC | Age: 55
End: 2025-07-07

## 2025-07-09 ENCOUNTER — TELEPHONE (OUTPATIENT)
Age: 55
End: 2025-07-09

## 2025-07-09 NOTE — TELEPHONE ENCOUNTER
Pt was prescribed Semaglutide-Weight Management (Wegovy) 0.25 MG/0.5ML and wanted to know if she should do her follow up appt with her PCP or a nurse.     Office staff were not available at this time. Please contact pt and advise. Thank you.

## 2025-08-01 ENCOUNTER — OFFICE VISIT (OUTPATIENT)
Dept: FAMILY MEDICINE CLINIC | Facility: CLINIC | Age: 55
End: 2025-08-01
Payer: COMMERCIAL

## 2025-08-01 VITALS
TEMPERATURE: 97.6 F | SYSTOLIC BLOOD PRESSURE: 124 MMHG | HEART RATE: 88 BPM | WEIGHT: 220.4 LBS | DIASTOLIC BLOOD PRESSURE: 84 MMHG | BODY MASS INDEX: 36.72 KG/M2 | RESPIRATION RATE: 16 BRPM | HEIGHT: 65 IN

## 2025-08-01 DIAGNOSIS — E66.09 CLASS 2 OBESITY DUE TO EXCESS CALORIES WITH BODY MASS INDEX (BMI) OF 38.0 TO 38.9 IN ADULT, UNSPECIFIED WHETHER SERIOUS COMORBIDITY PRESENT: ICD-10-CM

## 2025-08-01 DIAGNOSIS — E66.812 CLASS 2 OBESITY DUE TO EXCESS CALORIES WITH BODY MASS INDEX (BMI) OF 38.0 TO 38.9 IN ADULT, UNSPECIFIED WHETHER SERIOUS COMORBIDITY PRESENT: ICD-10-CM

## 2025-08-01 PROCEDURE — 99213 OFFICE O/P EST LOW 20 MIN: CPT | Performed by: NURSE PRACTITIONER

## 2025-08-01 RX ORDER — SEMAGLUTIDE 0.5 MG/.5ML
INJECTION, SOLUTION SUBCUTANEOUS
Qty: 2 ML | Refills: 0 | Status: SHIPPED | OUTPATIENT
Start: 2025-08-01

## (undated) DEVICE — SUT VICRYL 4-0 PS-2 27 IN J426H

## (undated) DEVICE — 3M™ STERI-STRIP™ COMPOUND BENZOIN TINCTURE 40 BAGS/CARTON 4 CARTONS/CASE C1544: Brand: 3M™ STERI-STRIP™

## (undated) DEVICE — SURGICEL 4 X 8IN

## (undated) DEVICE — SUT VICRYL 3-0 SH 27 IN J416H

## (undated) DEVICE — ELECTRODE BLADE MOD E-Z CLEAN  2.75IN 7CM -0012AM

## (undated) DEVICE — NEEDLE 25G X 5/8IN

## (undated) DEVICE — BIPOLAR CORD DISP

## (undated) DEVICE — THYROID SHEET: Brand: CONVERTORS

## (undated) DEVICE — SUT MONOCRYL 5-0 P-3 18 IN Y493G

## (undated) DEVICE — PACK UNIVERSAL NECK

## (undated) DEVICE — HARMONIC 1100 SHEARS, 20CM SHAFT LENGTH: Brand: HARMONIC

## (undated) DEVICE — 3M™ STERI-STRIP™ REINFORCED ADHESIVE SKIN CLOSURES, R1547, 1/2 IN X 4 IN (12 MM X 100 MM), 6 STRIPS/ENVELOPE: Brand: 3M™ STERI-STRIP™

## (undated) DEVICE — INTENDED FOR TISSUE SEPARATION, AND OTHER PROCEDURES THAT REQUIRE A SHARP SURGICAL BLADE TO PUNCTURE OR CUT.: Brand: BARD-PARKER SAFETY BLADES SIZE 15, STERILE

## (undated) DEVICE — SUT SILK 2-0 18 IN A185H

## (undated) DEVICE — DRAPE SURGIKIT SADDLE BAG

## (undated) DEVICE — GLOVE SRG BIOGEL ECLIPSE 7

## (undated) DEVICE — GLOVE INDICATOR PI UNDERGLOVE SZ 7 BLUE

## (undated) DEVICE — SUT SILK 3-0 18 IN A184H

## (undated) DEVICE — CHLORAPREP HI-LITE 26ML ORANGE

## (undated) DEVICE — WORKING LENGTH 235CM, WORKING CHANNEL 2.8MM: Brand: RESOLUTION 360 CLIP

## (undated) DEVICE — SUT SILK 2-0 SH 30 IN K833H